# Patient Record
Sex: MALE | Race: WHITE | NOT HISPANIC OR LATINO | Employment: FULL TIME | URBAN - METROPOLITAN AREA
[De-identification: names, ages, dates, MRNs, and addresses within clinical notes are randomized per-mention and may not be internally consistent; named-entity substitution may affect disease eponyms.]

---

## 2018-08-04 ENCOUNTER — APPOINTMENT (OUTPATIENT)
Dept: CT IMAGING | Facility: HOSPITAL | Age: 48
End: 2018-08-04

## 2018-08-04 ENCOUNTER — HOSPITAL ENCOUNTER (INPATIENT)
Facility: HOSPITAL | Age: 48
LOS: 5 days | Discharge: NURSING, REHAB OR LONG-TERM CARE | End: 2018-08-09
Attending: EMERGENCY MEDICINE | Admitting: INTERNAL MEDICINE

## 2018-08-04 ENCOUNTER — APPOINTMENT (OUTPATIENT)
Dept: GENERAL RADIOLOGY | Facility: HOSPITAL | Age: 48
End: 2018-08-04

## 2018-08-04 DIAGNOSIS — Z74.09 IMPAIRED FUNCTIONAL MOBILITY, BALANCE, GAIT, AND ENDURANCE: ICD-10-CM

## 2018-08-04 DIAGNOSIS — I61.2 NONTRAUMATIC HEMORRHAGE OF LEFT CEREBRAL HEMISPHERE (HCC): Primary | ICD-10-CM

## 2018-08-04 DIAGNOSIS — R47.1 DYSARTHRIA: ICD-10-CM

## 2018-08-04 DIAGNOSIS — I10 SEVERE UNCONTROLLED HYPERTENSION: ICD-10-CM

## 2018-08-04 DIAGNOSIS — Z74.09 IMPAIRED MOBILITY AND ADLS: ICD-10-CM

## 2018-08-04 DIAGNOSIS — Z78.9 IMPAIRED MOBILITY AND ADLS: ICD-10-CM

## 2018-08-04 DIAGNOSIS — R53.1 WEAKNESS: ICD-10-CM

## 2018-08-04 PROBLEM — I61.9 ICH (INTRACEREBRAL HEMORRHAGE): Status: ACTIVE | Noted: 2018-08-04

## 2018-08-04 LAB
ABO GROUP BLD: NORMAL
ALBUMIN SERPL-MCNC: 4.87 G/DL (ref 3.2–4.8)
ALBUMIN/GLOB SERPL: 1.4 G/DL (ref 1.5–2.5)
ALP SERPL-CCNC: 108 U/L (ref 25–100)
ALT SERPL W P-5'-P-CCNC: 28 U/L (ref 7–40)
ANION GAP SERPL CALCULATED.3IONS-SCNC: 12 MMOL/L (ref 3–11)
APTT PPP: 27.5 SECONDS (ref 24–31)
AST SERPL-CCNC: 24 U/L (ref 0–33)
BASOPHILS # BLD AUTO: 0.05 10*3/MM3 (ref 0–0.2)
BASOPHILS NFR BLD AUTO: 0.4 % (ref 0–1)
BILIRUB SERPL-MCNC: 0.3 MG/DL (ref 0.3–1.2)
BLD GP AB SCN SERPL QL: NEGATIVE
BUN BLD-MCNC: 20 MG/DL (ref 9–23)
BUN/CREAT SERPL: 17.1 (ref 7–25)
CALCIUM SPEC-SCNC: 9.7 MG/DL (ref 8.7–10.4)
CHLORIDE SERPL-SCNC: 104 MMOL/L (ref 99–109)
CO2 SERPL-SCNC: 26 MMOL/L (ref 20–31)
CREAT BLD-MCNC: 1.17 MG/DL (ref 0.6–1.3)
DEPRECATED RDW RBC AUTO: 47.1 FL (ref 37–54)
EOSINOPHIL # BLD AUTO: 0.25 10*3/MM3 (ref 0–0.3)
EOSINOPHIL NFR BLD AUTO: 2.1 % (ref 0–3)
ERYTHROCYTE [DISTWIDTH] IN BLOOD BY AUTOMATED COUNT: 14.7 % (ref 11.3–14.5)
GFR SERPL CREATININE-BSD FRML MDRD: 67 ML/MIN/1.73
GLOBULIN UR ELPH-MCNC: 3.4 GM/DL
GLUCOSE BLD-MCNC: 112 MG/DL (ref 70–100)
HCT VFR BLD AUTO: 44.6 % (ref 38.9–50.9)
HGB BLD-MCNC: 14.7 G/DL (ref 13.1–17.5)
HOLD SPECIMEN: NORMAL
HOLD SPECIMEN: NORMAL
IMM GRANULOCYTES # BLD: 0.02 10*3/MM3 (ref 0–0.03)
IMM GRANULOCYTES NFR BLD: 0.2 % (ref 0–0.6)
INR PPP: 0.96 (ref 0.91–1.09)
LYMPHOCYTES # BLD AUTO: 1.93 10*3/MM3 (ref 0.6–4.8)
LYMPHOCYTES NFR BLD AUTO: 16.1 % (ref 24–44)
MAGNESIUM SERPL-MCNC: 2.2 MG/DL (ref 1.3–2.7)
MCH RBC QN AUTO: 29.1 PG (ref 27–31)
MCHC RBC AUTO-ENTMCNC: 33 G/DL (ref 32–36)
MCV RBC AUTO: 88.3 FL (ref 80–99)
MONOCYTES # BLD AUTO: 0.88 10*3/MM3 (ref 0–1)
MONOCYTES NFR BLD AUTO: 7.3 % (ref 0–12)
NEUTROPHILS # BLD AUTO: 8.91 10*3/MM3 (ref 1.5–8.3)
NEUTROPHILS NFR BLD AUTO: 74.1 % (ref 41–71)
PHOSPHATE SERPL-MCNC: 1.8 MG/DL (ref 2.4–5.1)
PLATELET # BLD AUTO: 291 10*3/MM3 (ref 150–450)
PMV BLD AUTO: 11.9 FL (ref 6–12)
POTASSIUM BLD-SCNC: 3.7 MMOL/L (ref 3.5–5.5)
PROT SERPL-MCNC: 8.3 G/DL (ref 5.7–8.2)
PROTHROMBIN TIME: 10.1 SECONDS (ref 9.6–11.5)
RBC # BLD AUTO: 5.05 10*6/MM3 (ref 4.2–5.76)
RH BLD: POSITIVE
SODIUM BLD-SCNC: 142 MMOL/L (ref 132–146)
T&S EXPIRATION DATE: NORMAL
TROPONIN I SERPL-MCNC: 0 NG/ML (ref 0–0.07)
WBC NRBC COR # BLD: 12.02 10*3/MM3 (ref 3.5–10.8)
WHOLE BLOOD HOLD SPECIMEN: NORMAL
WHOLE BLOOD HOLD SPECIMEN: NORMAL

## 2018-08-04 PROCEDURE — 71045 X-RAY EXAM CHEST 1 VIEW: CPT

## 2018-08-04 PROCEDURE — 85610 PROTHROMBIN TIME: CPT | Performed by: INTERNAL MEDICINE

## 2018-08-04 PROCEDURE — 86901 BLOOD TYPING SEROLOGIC RH(D): CPT

## 2018-08-04 PROCEDURE — 99024 POSTOP FOLLOW-UP VISIT: CPT | Performed by: NEUROLOGICAL SURGERY

## 2018-08-04 PROCEDURE — 99291 CRITICAL CARE FIRST HOUR: CPT | Performed by: INTERNAL MEDICINE

## 2018-08-04 PROCEDURE — 99253 IP/OBS CNSLTJ NEW/EST LOW 45: CPT | Performed by: NEUROLOGICAL SURGERY

## 2018-08-04 PROCEDURE — 70450 CT HEAD/BRAIN W/O DYE: CPT

## 2018-08-04 PROCEDURE — 85025 COMPLETE CBC W/AUTO DIFF WBC: CPT | Performed by: EMERGENCY MEDICINE

## 2018-08-04 PROCEDURE — 84100 ASSAY OF PHOSPHORUS: CPT | Performed by: INTERNAL MEDICINE

## 2018-08-04 PROCEDURE — 83735 ASSAY OF MAGNESIUM: CPT | Performed by: INTERNAL MEDICINE

## 2018-08-04 PROCEDURE — 84484 ASSAY OF TROPONIN QUANT: CPT

## 2018-08-04 PROCEDURE — 86900 BLOOD TYPING SEROLOGIC ABO: CPT

## 2018-08-04 PROCEDURE — 85730 THROMBOPLASTIN TIME PARTIAL: CPT | Performed by: EMERGENCY MEDICINE

## 2018-08-04 PROCEDURE — 86900 BLOOD TYPING SEROLOGIC ABO: CPT | Performed by: EMERGENCY MEDICINE

## 2018-08-04 PROCEDURE — 86901 BLOOD TYPING SEROLOGIC RH(D): CPT | Performed by: EMERGENCY MEDICINE

## 2018-08-04 PROCEDURE — 99285 EMERGENCY DEPT VISIT HI MDM: CPT

## 2018-08-04 PROCEDURE — 93005 ELECTROCARDIOGRAM TRACING: CPT | Performed by: EMERGENCY MEDICINE

## 2018-08-04 PROCEDURE — 86850 RBC ANTIBODY SCREEN: CPT | Performed by: EMERGENCY MEDICINE

## 2018-08-04 PROCEDURE — 36415 COLL VENOUS BLD VENIPUNCTURE: CPT

## 2018-08-04 PROCEDURE — 80053 COMPREHEN METABOLIC PANEL: CPT | Performed by: EMERGENCY MEDICINE

## 2018-08-04 RX ORDER — PANTOPRAZOLE SODIUM 40 MG/10ML
40 INJECTION, POWDER, LYOPHILIZED, FOR SOLUTION INTRAVENOUS
Status: DISCONTINUED | OUTPATIENT
Start: 2018-08-05 | End: 2018-08-06 | Stop reason: ALTCHOICE

## 2018-08-04 RX ORDER — SODIUM CHLORIDE 9 MG/ML
75 INJECTION, SOLUTION INTRAVENOUS CONTINUOUS
Status: DISCONTINUED | OUTPATIENT
Start: 2018-08-04 | End: 2018-08-09 | Stop reason: HOSPADM

## 2018-08-04 RX ORDER — SODIUM CHLORIDE 0.9 % (FLUSH) 0.9 %
10 SYRINGE (ML) INJECTION AS NEEDED
Status: DISCONTINUED | OUTPATIENT
Start: 2018-08-04 | End: 2018-08-09 | Stop reason: HOSPADM

## 2018-08-04 RX ORDER — SODIUM CHLORIDE 0.9 % (FLUSH) 0.9 %
1-10 SYRINGE (ML) INJECTION AS NEEDED
Status: DISCONTINUED | OUTPATIENT
Start: 2018-08-04 | End: 2018-08-09 | Stop reason: HOSPADM

## 2018-08-04 RX ADMIN — NICARDIPINE HYDROCHLORIDE 5 MG/HR: 2.5 INJECTION INTRAVENOUS at 22:23

## 2018-08-04 RX ADMIN — SODIUM CHLORIDE 75 ML/HR: 9 INJECTION, SOLUTION INTRAVENOUS at 22:24

## 2018-08-05 ENCOUNTER — APPOINTMENT (OUTPATIENT)
Dept: CT IMAGING | Facility: HOSPITAL | Age: 48
End: 2018-08-05

## 2018-08-05 PROBLEM — I10 ESSENTIAL HYPERTENSION: Status: ACTIVE | Noted: 2018-08-05

## 2018-08-05 PROBLEM — Z91.199 H/O NONCOMPLIANCE WITH MEDICAL TREATMENT, PRESENTING HAZARDS TO HEALTH: Status: ACTIVE | Noted: 2018-08-05

## 2018-08-05 PROBLEM — IMO0001 CLASS 3 OBESITY DUE TO EXCESS CALORIES WITH SERIOUS COMORBIDITY AND BODY MASS INDEX (BMI) OF 45.0 TO 49.9 IN ADULT: Status: ACTIVE | Noted: 2018-08-05

## 2018-08-05 LAB
ABO GROUP BLD: NORMAL
AMPHET+METHAMPHET UR QL: NEGATIVE
AMPHETAMINES UR QL: NEGATIVE
ARTICHOKE IGE QN: 152 MG/DL (ref 0–130)
BARBITURATES UR QL SCN: NEGATIVE
BENZODIAZ UR QL SCN: NEGATIVE
BUPRENORPHINE SERPL-MCNC: NEGATIVE NG/ML
CANNABINOIDS SERPL QL: NEGATIVE
CHOLEST SERPL-MCNC: 179 MG/DL (ref 0–200)
COCAINE UR QL: NEGATIVE
GLUCOSE BLDC GLUCOMTR-MCNC: 108 MG/DL (ref 70–130)
GLUCOSE BLDC GLUCOMTR-MCNC: 114 MG/DL (ref 70–130)
HBA1C MFR BLD: 6 % (ref 4.8–5.6)
HDLC SERPL-MCNC: 43 MG/DL (ref 40–60)
METHADONE UR QL SCN: NEGATIVE
OPIATES UR QL: NEGATIVE
OXYCODONE UR QL SCN: NEGATIVE
PCP UR QL SCN: NEGATIVE
PROPOXYPH UR QL: NEGATIVE
RH BLD: POSITIVE
TRICYCLICS UR QL SCN: NEGATIVE
TRIGL SERPL-MCNC: 50 MG/DL (ref 0–150)

## 2018-08-05 PROCEDURE — 97165 OT EVAL LOW COMPLEX 30 MIN: CPT

## 2018-08-05 PROCEDURE — 70450 CT HEAD/BRAIN W/O DYE: CPT

## 2018-08-05 PROCEDURE — 82962 GLUCOSE BLOOD TEST: CPT

## 2018-08-05 PROCEDURE — 83036 HEMOGLOBIN GLYCOSYLATED A1C: CPT | Performed by: INTERNAL MEDICINE

## 2018-08-05 PROCEDURE — 92610 EVALUATE SWALLOWING FUNCTION: CPT

## 2018-08-05 PROCEDURE — 80306 DRUG TEST PRSMV INSTRMNT: CPT | Performed by: INTERNAL MEDICINE

## 2018-08-05 PROCEDURE — 97163 PT EVAL HIGH COMPLEX 45 MIN: CPT

## 2018-08-05 PROCEDURE — 99024 POSTOP FOLLOW-UP VISIT: CPT | Performed by: NEUROLOGICAL SURGERY

## 2018-08-05 PROCEDURE — 92523 SPEECH SOUND LANG COMPREHEN: CPT

## 2018-08-05 PROCEDURE — 80061 LIPID PANEL: CPT | Performed by: INTERNAL MEDICINE

## 2018-08-05 PROCEDURE — 25010000002 HYDRALAZINE PER 20 MG: Performed by: NURSE PRACTITIONER

## 2018-08-05 PROCEDURE — 99233 SBSQ HOSP IP/OBS HIGH 50: CPT | Performed by: INTERNAL MEDICINE

## 2018-08-05 RX ORDER — NAPROXEN SODIUM 220 MG
220 TABLET ORAL 2 TIMES DAILY PRN
COMMUNITY
End: 2018-08-09 | Stop reason: HOSPADM

## 2018-08-05 RX ORDER — LISINOPRIL 10 MG/1
10 TABLET ORAL
Status: DISCONTINUED | OUTPATIENT
Start: 2018-08-05 | End: 2018-08-05

## 2018-08-05 RX ORDER — HYDRALAZINE HYDROCHLORIDE 20 MG/ML
10 INJECTION INTRAMUSCULAR; INTRAVENOUS EVERY 6 HOURS PRN
Status: DISCONTINUED | OUTPATIENT
Start: 2018-08-05 | End: 2018-08-06

## 2018-08-05 RX ORDER — ENALAPRILAT 2.5 MG/2ML
1.25 INJECTION INTRAVENOUS EVERY 6 HOURS PRN
Status: DISCONTINUED | OUTPATIENT
Start: 2018-08-05 | End: 2018-08-05

## 2018-08-05 RX ORDER — LISINOPRIL 10 MG/1
10 TABLET ORAL EVERY 12 HOURS SCHEDULED
Status: DISCONTINUED | OUTPATIENT
Start: 2018-08-05 | End: 2018-08-06

## 2018-08-05 RX ADMIN — LISINOPRIL 10 MG: 10 TABLET ORAL at 12:29

## 2018-08-05 RX ADMIN — NICARDIPINE HYDROCHLORIDE 10 MG/HR: 2.5 INJECTION INTRAVENOUS at 09:35

## 2018-08-05 RX ADMIN — LISINOPRIL 10 MG: 10 TABLET ORAL at 20:57

## 2018-08-05 RX ADMIN — PANTOPRAZOLE SODIUM 40 MG: 40 INJECTION, POWDER, FOR SOLUTION INTRAVENOUS at 06:31

## 2018-08-05 RX ADMIN — HYDRALAZINE HYDROCHLORIDE 10 MG: 20 INJECTION INTRAMUSCULAR; INTRAVENOUS at 18:58

## 2018-08-05 RX ADMIN — NICARDIPINE HYDROCHLORIDE 15 MG/HR: 2.5 INJECTION INTRAVENOUS at 00:43

## 2018-08-05 RX ADMIN — NICARDIPINE HYDROCHLORIDE 7.5 MG/HR: 2.5 INJECTION INTRAVENOUS at 06:31

## 2018-08-05 RX ADMIN — NICARDIPINE HYDROCHLORIDE 15 MG/HR: 2.5 INJECTION INTRAVENOUS at 18:58

## 2018-08-05 RX ADMIN — NICARDIPINE HYDROCHLORIDE 10 MG/HR: 2.5 INJECTION INTRAVENOUS at 13:04

## 2018-08-05 RX ADMIN — NICARDIPINE HYDROCHLORIDE 10 MG/HR: 2.5 INJECTION INTRAVENOUS at 15:41

## 2018-08-05 RX ADMIN — SODIUM CHLORIDE 75 ML/HR: 9 INJECTION, SOLUTION INTRAVENOUS at 12:29

## 2018-08-05 RX ADMIN — SODIUM CHLORIDE 10 MG/HR: 9 INJECTION, SOLUTION INTRAVENOUS at 20:56

## 2018-08-05 RX ADMIN — NICARDIPINE HYDROCHLORIDE 15 MG/HR: 2.5 INJECTION INTRAVENOUS at 17:28

## 2018-08-05 NOTE — CONSULTS
NEUROSURGERY CONSULT    Referring Provider: No ref. provider found  Reason for Consultation: Right hemiparesis    No care team member to display    Chief complaint: Right-sided weakness    Subjective .     History of present illness:  The patient is a 48-year-old man who is a [employer] employee from [place] who developed abrupt onset of right-sided weakness about 1-2 hours ago and was brought to the emergency room at [facility].  He has a past history of hypertension untreated.  CT scan of the head while in the emergency room showed a small to moderate sized left basal ganglia hemorrhage with minimal mass effect.    Results Review:   CT scan: Left basal ganglia hemorrhage measuring 3 cm in maximum extent, 12 mm maximum width.    Review of Systems  Pertinent items are noted in HPI, all other systems reviewed.    History  No past medical history on file., No past surgical history on file., No family history on file., Social History   Substance Use Topics   • Smoking status: Not on file   • Smokeless tobacco: Not on file   • Alcohol use Not on file   ,   (Not in a hospital admission) and Allergies:  Patient has no known allergies.    Objective     Vital Signs   There were no vitals taken for this visit.    Physical Exam:  NEUROLOGICAL EXAMINATION:      MENTAL STATUS:  Alert and oriented.  Speech intact.  Recent and remote memory intact.      CRANIAL NERVES:  Cranial nerve II:  Visual fields are full to confrontation.  Cranial nerves III, IV and VI:  PERRLADC.  Extraocular movements are intact.  Nystagmus is not present.  Cranial nerve V:  Facial sensation is intact to light touch.  Cranial nerve VII:  Mild right central facial weakness  Cranial nerve VIII:  Hearing is intact to finger rub bilaterally.  Cranial nerves IX and X:  Palate elevates symmetrically.  Cranial nerve XI:  Shoulder shrug is intact.  Cranial nerve XII:  Tongue is midline without evidence of atrophy or fasciculation.    MOTOR:  Moderate right  hemiparesis.  Unable to fully raise his arm above his head, but able to move his arm and hand against gravity.  Similarly able to partially raise his leg but not fully against gravity on the right.  Left strength is normal.    SENSATION:  Intact to touch over the left side, somewhat reduced on the right.    REFLEXES:  DTR symmetrical trace upper and lower extremities, positive Babinski on the right.      Assessment/Plan     DIAGNOSIS: Spontaneous left basal ganglia hemorrhage, small to moderate in size, with moderate right hemiparesis and preserved speech.    RECOMMENDATION: Observation in intensive care with blood pressure control.  There will be no need for neurosurgical treatment.  Repeat CT scan in a.m. to confirm stability of the hemorrhage.  Proceed with rehabilitation planning area and    Vipin Fong MD  08/04/18  10:28 PM

## 2018-08-05 NOTE — PROGRESS NOTES
"NEUROSURGERY PROGRESS NOTE    Vital Signs  Blood pressure 144/77, pulse 63, temperature 97.5 °F (36.4 °C), temperature source Axillary, resp. rate 16, height 167.6 cm (66\"), weight 136 kg (300 lb), SpO2 99 %.    Interval History:   Day #1 post ICH.    Neurologically unchanged since last night: Moderate right hemiparesis, speech intact.    A.m. CT scan of the head shows no change in the appearance of the ICH.  It is obviously stable.    Has had significant blood pressure elevation, current reading 144/77.  His ICH is a typical hypertensive hemorrhage location, so long-term blood pressure control is a primary goal.      ASSESSMENT: Stable small-medium left basal ganglia ICH with moderate right hemiparesis.    PLAN: BP control.  Initiate rehabilitation planning.  No need for further neurosurgical follow-up.    Vipin Fong MD  08/05/18  7:41 AM    "

## 2018-08-05 NOTE — THERAPY EVALUATION
Acute Care - Physical Therapy Initial Evaluation  [facility]     Patient Name: Valente Arndt Jr.  : 1970  MRN: [id number]  Today's Date: 2018   Onset of Illness/Injury or Date of Surgery: 18  Date of Referral to PT: 18  Referring Physician: MD Hughes      Admit Date: 2018    Visit Dx:     ICD-10-CM ICD-9-CM   1. Nontraumatic hemorrhage of left cerebral hemisphere (CMS/[facility]) I61.2 431   2. Severe uncontrolled hypertension I10 401.9   3. Weakness R53.1 780.79   4. Dysarthria R47.1 784.51   5. Impaired mobility and ADLs Z74.09 799.89   6. Impaired functional mobility, balance, gait, and endurance Z74.09 V49.89     Patient Active Problem List   Diagnosis   • ICH (intracerebral hemorrhage) (CMS/[facility])   • Essential hypertension   • H/O noncompliance with medical treatment, presenting hazards to health   • Class 3 obesity due to excess calories with serious comorbidity and body mass index (BMI) of 45.0 to 49.9 in adult (CMS/[facility])     History reviewed. No pertinent past medical history.  Past Surgical History:   Procedure Laterality Date   • APPENDECTOMY     • FOOT SURGERY          PT ASSESSMENT (last 12 hours)      Physical Therapy Evaluation     Row Name 18 1335          General Information    Patient Profile Reviewed? yes  -VENITA     Onset of Illness/Injury or Date of Surgery 18  -VENITA     Referring Physician MD Hughes  -VENITA     Patient Observations alert;cooperative;agree to therapy  -VENITA     Prior Level of Function independent:;gait;transfer;bed mobility;ADL's  -VENITA     Equipment Currently Used at Home none  -VENITA     Pertinent History of Current Functional Problem patient admitted with sudden onset of right side weakness found to have left basal ganglia hemorrhage  -VENITA     Risks Reviewed patient and family:;LOB;increased discomfort  -VENITA     Benefits Reviewed patient:;improve function;increase independence;increase strength;decrease risk of DVT  -VENITA     Barriers to Rehab none  identified  -VENITA     Row Name 08/05/18 133          Relationship/Environment    Lives With child(smith), dependent;parent(s)  -VENITA     Row Name 08/05/18 1337          Resource/Environmental Concerns    Current Living Arrangements home/apartment/condo  -VENITA     Resource/Environmental Concerns none  -VENITA     Row Name 08/05/18 1335          Safety Issues, Functional Mobility    Impairments Affecting Function (Mobility) balance;coordination;grasp;motor control;motor planning;strength  -     Row Name 08/05/18 5692          Physical Therapy Clinical Impression    Date of Referral to PT 08/05/18  -VENITA     PT Diagnosis (PT Clinical Impression) impaired bed mobility transfer and gait, decreased strength in right UE and LE decreased balance  -VENITA     Patient/Family Goals Statement (PT Clinical Impression) patient to go home with family  -VENITA     Criteria for Skilled Interventions Met (PT Clinical Impression) yes;treatment indicated  -VENITA     Rehab Potential (PT Clinical Summary) good, to achieve stated therapy goals  -VENITA     Care Plan Review (PT) evaluation/treatment results reviewed;care plan/treatment goals reviewed;risks/benefits reviewed;patient/other agree to care plan  -VENITA     Care Plan Review, Other Participant (PT Clinical Impression) mother  -VENITA     Row Name 08/05/18 7895          Physical Therapy Goals    Bed Mobility Goal Selection (PT) bed mobility, PT goal 1  -VENITA     Transfer Goal Selection (PT) transfer, PT goal 1  -VENITA     Gait Training Goal Selection (PT) gait training, PT goal 1  -VENITA     Balance Goal Selection (PT) balance, PT goal 1  -VENITA     Additional Documentation Balance Goal Selection (PT) (Row)  -     Row Name 08/05/18 8198          Bed Mobility Goal 1 (PT)    Activity/Assistive Device (Bed Mobility Goal 1, PT) sit to supine/supine to sit  -VENITA     [place] Level/Cues Needed (Bed Mobility Goal 1, PT) independent  -VENITA     Time Frame (Bed Mobility Goal 1, PT) long term goal (LTG);10 days  -VENITA      Progress/Outcomes (Bed Mobility Goal 1, PT) goal ongoing  -VENITA     Row Name 08/05/18 1335          Transfer Goal 1 (PT)    Activity/Assistive Device (Transfer Goal 1, PT) sit-to-stand/stand-to-sit  -VENITA     [place] Level/Cues Needed (Transfer Goal 1, PT) independent  -VENITA     Time Frame (Transfer Goal 1, PT) long term goal (LTG);10 days  -VENITA     Progress/Outcome (Transfer Goal 1, PT) goal ongoing  -VENITA     Row Name 08/05/18 1335          Gait Training Goal 1 (PT)    Activity/Assistive Device (Gait Training Goal 1, PT) gait (walking locomotion)  -VNEITA     [place] Level (Gait Training Goal 1, PT) contact guard assist  -VENITA     Distance (Gait Goal 1, PT) 350  -VENITA     Time Frame (Gait Training Goal 1, PT) long term goal (LTG);10 days  -VENITA     Progress/Outcome (Gait Training Goal 1, PT) goal ongoing  -VENITA     Row Name 08/05/18 1335          Balance Goal 1 (PT)    Activity/Assistive Device (Balance Goal 1, PT) standing, dynamic  -VENITA     [place] Level/Cues Needed (Balance Goal 1, PT) contact guard assist  -VENITA     Time Frame (Balance Goal 1, PT) long term goal (LTG);10 days  -VENITA     Progress/Outcomes (Balance Goal 1, PT) goal ongoing  -VENITA     Row Name 08/05/18 2795          Patient Education Goal (PT)    Activity (Patient Education Goal, PT) HEP  -VENITA     [place]/Cues/Accuracy (Memory Goal 2, PT) verbalizes understanding  -VENITA     Time Frame (Patient Education Goal, PT) long term goal (LTG);10 days  -VENITA     Progress/Outcome (Patient Education Goal, PT) goal ongoing  -VENITA       User Key  (r) = Recorded By, (t) = Taken By, (c) = Cosigned By    Initials Name Provider Type    Sharona Mendez, PT Physical Therapist          Physical Therapy Education     Title: PT OT SLP Therapies (Active)     Topic: Physical Therapy (Active)     Point: Mobility training (Active)    Learning Progress Summary     Learner Status Readiness Method Response Comment Documented by    Patient Active Acceptance E NR  VENITA 08/05/18 4330           Point: Home exercise program (Active)    Learning Progress Summary     Learner Status Readiness Method Response Comment Documented by    Patient Active Acceptance E NR  VENITA 08/05/18 1335          Point: Body mechanics (Active)    Learning Progress Summary     Learner Status Readiness Method Response Comment Documented by    Patient Active Acceptance E NR  VENITA 08/05/18 1335          Point: Precautions (Active)    Learning Progress Summary     Learner Status Readiness Method Response Comment Documented by    Patient Active Acceptance E NR  VENITA 08/05/18 1335                      User Key     Initials Effective Dates Name Provider Type Discipline     06/19/15 -  Sharona Rondon, PT Physical Therapist PT                PT Recommendation and Plan  Anticipated Discharge Disposition (PT): inpatient rehabilitation facility  Planned Therapy Interventions (PT Eval): balance training, bed mobility training, gait training, home exercise program, strengthening, transfer training  Therapy Frequency (PT Clinical Impression): daily  Outcome Summary/Treatment Plan (PT)  Anticipated Discharge Disposition (PT): inpatient rehabilitation facility  Plan of Care Reviewed With: patient, mother  Outcome Summary: PT eval completed patient is able to ambulate 250 ft with assist of 2. patient tends to lean to the right during ambulation with dec step length on the right and toe dragging at times. patient may need  IP  rehab stay on D/C for improving mobility          Outcome Measures     Row Name 08/05/18 1335 08/05/18 0818          How much help from another person do you currently need...    Turning from your back to your side while in flat bed without using bedrails? 4  -VENITA  --     Moving from lying on back to sitting on the side of a flat bed without bedrails? 3  -VENITA  --     Moving to and from a bed to a chair (including a wheelchair)? 3  -VENITA  --     Standing up from a chair using your arms (e.g., wheelchair, bedside chair)? 3  -VENITA  --      Climbing 3-5 steps with a railing? 2  -VENITA  --     To walk in hospital room? 2  -VENITA  --     AM-PAC 6 Clicks Score 17  -VENITA  --        How much help from another is currently needed...    Putting on and taking off regular lower body clothing?  -- 2  -JR     Bathing (including washing, rinsing, and drying)  -- 2  -JR     Toileting (which includes using toilet bed pan or urinal)  -- 2  -JR     Putting on and taking off regular upper body clothing  -- 2  -JR     Taking care of personal grooming (such as brushing teeth)  -- 2  -JR     Eating meals  -- 1  -JR     Score  -- 11  -JR        Modified [place] Scale    Modified [place] Scale 4 - Moderately severe disability.  Unable to walk without assistance, and unable to attend to own bodily needs without assistance.  -VENITA 4 - Moderately severe disability.  Unable to walk without assistance, and unable to attend to own bodily needs without assistance.  -JR        Functional Assessment    Outcome Measure Options AM-PAC 6 Clicks Basic Mobility (PT)  -VENITA AM-PAC 6 Clicks Daily Activity (OT);Modified [place]  -JR       User Key  (r) = Recorded By, (t) = Taken By, (c) = Cosigned By    Initials Name Provider Type    Sharona Mendez, PT Physical Therapist    JR Lita Kaminski, OT Occupational Therapist           Time Calculation:         PT Charges     Row Name 08/05/18 2679             Time Calculation    Start Time 1335  -VENITA      PT Received On 08/05/18  -VENITA      PT Goal Re-Cert Due Date 08/15/18  -VENITA        User Key  (r) = Recorded By, (t) = Taken By, (c) = Cosigned By    Initials Name Provider Type    Sharona Mendez PT Physical Therapist        Therapy Suggested Charges     Code   Minutes Charges    None           Therapy Charges for Today     Code Description Service Date Service Provider Modifiers Qty    76364876936 HC PT EVAL HIGH COMPLEXITY 4 8/5/2018 Sharona Rondon, PT GP 1    67962165343 HC PT THER SUPP EA 15 MIN 8/5/2018 Sharona Rondon, PT GP 1           PT G-Codes  Outcome Measure Options: AM-PAC 6 Clicks Basic Mobility (PT)      Sharona Rondon, PT  8/5/2018

## 2018-08-05 NOTE — THERAPY EVALUATION
"Acute Care - Occupational Therapy Initial Evaluation  [facility]     Patient Name: Valente Arndt Jr.  : 1970  MRN: [id number]  Today's Date: 2018  Onset of Illness/Injury or Date of Surgery: 18  Date of Referral to OT: 18  Referring Physician: Dr. Hughes    Admit Date: 2018       ICD-10-CM ICD-9-CM   1. Nontraumatic hemorrhage of left cerebral hemisphere (CMS/HCC) I61.2 431   2. Severe uncontrolled hypertension I10 401.9   3. Weakness R53.1 780.79   4. Dysarthria R47.1 784.51   5. Impaired mobility and ADLs Z74.09 799.89     Patient Active Problem List   Diagnosis   • ICH (intracerebral hemorrhage) (CMS/HCC)     History reviewed. No pertinent past medical history.  Past Surgical History:   Procedure Laterality Date   • APPENDECTOMY     • FOOT SURGERY            OT ASSESSMENT FLOWSHEET (last 72 hours)      Occupational Therapy Evaluation     Row Name 18 0818                   OT Evaluation Time/Intention    Subjective Information no complaints  -JR        Document Type evaluation  -JR        Mode of Treatment occupational therapy  -JR        Patient Effort good  -JR        Symptoms Noted During/After Treatment none  -JR           General Information    Patient Profile Reviewed? yes  -JR        Onset of Illness/Injury or Date of Surgery 18  -JR        Referring Physician Dr. Hughes  -JR        Prior Level of Function independent:;gait;transfer;bed mobility;ADL's;home management;driving  -JR        Equipment Currently Used at Home none  -JR        Pertinent History of Current Functional Problem Pt admitted with sudden onset R sided weakness and \"drunk feeling.\" CT head showed hyperdense acute hemorrhage in L basal ganglia.  -JR        Existing Precautions/Restrictions fall   R sided weakness  -JR        Risks Reviewed patient:;increased discomfort  -JR        Benefits Reviewed patient:;improve function;increase independence  -JR        Barriers to Rehab none identified  " -JR           Relationship/Environment    Primary Source of Support/Comfort child(smith);parent  -JR        Lives With child(smith), dependent;parent(s)   Lives with mother and daughter  -JR           Resource/Environmental Concerns    Current Living Arrangements home/apartment/condo  -JR           Home Main Entrance    Number of Stairs, Main Entrance one  -JR           Stairs Within Home, Secondary    Stairs, Within Home, Secondary --   Pt reports he does not go upstairs  -JR           Cognitive Assessment/Interventions    Additional Documentation Cognitive Assessment/Intervention (Group)  -JR           Cognitive Assessment/Intervention- PT/OT    Affect/Mental Status (Cognitive) flat/blunted affect;sad/depressed affect  -JR        Orientation Status (Cognition) oriented x 4  -JR        Follows Commands (Cognition) WNL  -JR           Safety Issues, Functional Mobility    Impairments Affecting Function (Mobility) balance;coordination;grasp;motor control;motor planning;strength  -JR           Bed Mobility Assessment/Treatment    Bed Mobility Assessment/Treatment supine-sit  -JR        Supine-Sit Wy[place] (Bed Mobility) contact guard  -        Assistive Device (Bed Mobility) bed rails;head of bed elevated  -JR           Transfer Assessment/Treatment    Transfer Assessment/Treatment bed-chair transfer  -JR           Bed-Chair Transfer    Bed-Chair Wyatt (Transfers) contact guard;verbal cues  -           ADL Assessment/Intervention    BADL Assessment/Intervention lower body dressing  -JR           Lower Body Dressing Assessment/Training    Lower Body Dressing Wyatt Level don;socks;dependent (less than 25% patient effort)  -JR        Lower Body Dressing Position supine  -JR           BADL Safety/Performance    Impairments, BADL Safety/Performance balance;grasp/prehension;coordination;motor control;motor planning;strength  -JR           General ROM    GENERAL ROM COMMENTS B UE ROM WFL  -            General Assessment (Manual Muscle Testing)    Comment, General Manual Muscle Testing (MMT) Assessment L UE 5/5 R shoulder flex 2-/5, elbow flex 3/5, R grasp 3/5  -           Motor Assessment/Interventions    Additional Documentation Balance (Group);Gross Motor Coordination (Group);Fine Motor Testing & Training (Group)  -           Gross Motor Coordination    Gross Motor Impairments --   Impaired finger to nose and rapid alternating R  -JR           Balance    Balance static sitting balance;dynamic standing balance  -           Static Sitting Balance    Level of [place] (Unsupported Sitting, Static Balance) contact guard assist  -JR        Sitting Position (Unsupported Sitting, Static Balance) sitting on edge of bed  -JR        Time Able to Maintain Position (Unsupported Sitting, Static Balance) 30 to 45 seconds  -JR           Dynamic Standing Balance    Level of [place], Reaches Outside Midline (Standing, Dynamic Balance) contact guard assist  -JR        Time Able to Maintain Position, Reaches Outside Midline (Standing, Dynamic Balance) less than 15 seconds  -           Fine Motor Testing & Training    Comment, Fine Motor Coordination Impaired opposition R   Pt reports he is R handed  -           Sensory Assessment/Intervention    Sensory General Assessment light touch sensation deficits identified   Mild decreased localization of touch R hand  -JR        Additional Documentation Vision Assessment/Intervention (Group)  -           Vision Assessment/Intervention    Visual Impairment/Limitations WNL  -JR           Positioning and Restraints    Pre-Treatment Position in bed  -JR        Post Treatment Position chair  -JR        In Chair notified nsg;sitting;call light within reach;encouraged to call for assist;RUE elevated  -           Pain Assessment    Additional Documentation Pain Scale: Numbers Pre/Post-Treatment (Group)  -JR           Pain Scale: Numbers Pre/Post-Treatment    Pain Scale:  Numbers, Pretreatment 0/10 - no pain  -JR        Pain Scale: Numbers, Post-Treatment 0/10 - no pain  -JR           Plan of Care Review    Plan of Care Reviewed With patient  -JR           Clinical Impression (OT)    Date of Referral to OT 08/04/18  -JR        OT Diagnosis Decreased independence with ADL's and mobility.  -JR        Patient/Family Goals Statement (OT Eval) Return home  -JR        Criteria for Skilled Therapeutic Interventions Met (OT Eval) yes;treatment indicated  -JR        Rehab Potential (OT Eval) good, to achieve stated therapy goals  -JR        Therapy Frequency (OT Eval) daily  -JR        Care Plan Review (OT) risks/benefits reviewed;patient/other agree to care plan  -JR        Anticipated Equipment Needs at Discharge (OT) feeding equipment  -JR        Anticipated Discharge Disposition (OT) home with OP services  -JR           Vital Signs    Pre Systolic BP Rehab 153  -JR        Pre Treatment Diastolic BP 88  -JR        Post Systolic BP Rehab 133  -JR        Post Treatment Diastolic BP 83  -JR        Pretreatment Heart Rate (beats/min) 82  -JR        Posttreatment Heart Rate (beats/min) 80  -JR        Pre SpO2 (%) 95  -JR        O2 Delivery Pre Treatment room air  -JR        Post SpO2 (%) 93  -JR        O2 Delivery Post Treatment room air  -JR        Pre Patient Position Supine  -JR        Intra Patient Position Standing  -JR        Post Patient Position Sitting  -JR           Planned OT Interventions    Planned Therapy Interventions (OT Eval) adaptive equipment training;BADL retraining;functional balance retraining;neuromuscular control/coordination retraining;occupation/activity based interventions;passive ROM/stretching;patient/caregiver education/training;strengthening exercise;transfer/mobility retraining  -JR           OT Goals    Dressing Goal Selection (OT) dressing, OT goal 1  -JR        Self-Feeding Goal Selection (OT) self feeding, OT goal 1  -JR        Strength Goal Selection (OT)  strength, OT goal 1  -JR        Coordination Goal Selection (OT) coordination, OT goal 1  -JR        Additional Documentation Coordination Goal Selection (OT) (Row);Strength Goal Selection (OT) (Row);Self-Feeding Goal Selection (OT) (Row)  -JR           Dressing Goal 1 (OT)    Activity/Assistive Device (Dressing Goal 1, OT) lower body dressing   denita/doff B socks  -JR        [place]/Cues Needed (Dressing Goal 1, OT) minimum assist (75% or more patient effort);verbal cues required   utilizing one handed technique  -JR        Time Frame (Dressing Goal 1, OT) long term goal (LTG);1 week  -JR        Barriers (Dressing Goal 1, OT) R UE weakness  -JR        Progress/Outcome (Dressing Goal 1, OT) goal ongoing  -JR           Self-Feeding Goal 1 (OT)    Activity/Assistive Device (Self-Feeding Goal 1, OT) self-feeding skills, all   utilizing AE PRN  -JR        [place] Level/Cues Needed (Self-Feeding Goal 1, OT) minimum assist (75% or more patient effort);verbal cues required  -JR        Time Frame (Self-Feeding Goal 1, OT) long term goal (LTG);1 week  -JR        Barriers (Self-Feeding Goal 1, OT) R UE weakness  -JR        Progress/Outcomes (Self-Feeding Goal 1, OT) goal ongoing  -JR           Strength Goal 1 (OT)    Strength Goal 1 (OT) Pt to increase R UE strength by 1/2 muscle grade to increase independence with ADL's.  -JR        Time Frame (Strength Goal 1, OT) long term goal (LTG);1 week  -JR        Progress/Outcome (Strength Goal 1, OT) goal ongoing  -JR           Coordination Goal 1 (OT)    Activity/Assistive Device (Coordination Goal 1, OT) FM written ex program;GM written ex program  -JR        [place] Level/Cues Needed (Coordination Goal 1, OT) independent;verbal cues required  -JR        Time Frame (Coordination Goal 1, OT) long term goal (LTG);1 week  -JR        Progress/Outcomes (Coordination Goal 1, OT) goal ongoing  -JR           Living Environment    Home Accessibility stairs to enter  home;stairs within home;tub/shower is not walk in  -          User Key  (r) = Recorded By, (t) = Taken By, (c) = Cosigned By    Initials Name Effective Dates     Lita Kaminski, OT 06/22/15 -            Occupational Therapy Education     Title: PT OT SLP Therapies (Active)     Topic: Occupational Therapy (Active)     Point: ADL training (Done)     Description: Instruct learner(s) on proper safety adaptation and remediation techniques during self care or transfers.   Instruct in proper use of assistive devices.   Learning Progress Summary     Learner Status Readiness Method Response Comment Documented by    Patient Done Acceptance E VU Educated pt regarding role of therapy, limb protection, safety precautions, transfer techniques and encouraged use of call kaplan.  08/05/18 0904                      User Key     Initials Effective Dates Name Provider Type Discipline     06/22/15 -  Lita Kaminski, OT Occupational Therapist OT                  OT Recommendation and Plan  Outcome Summary/Treatment Plan (OT)  Anticipated Equipment Needs at Discharge (OT): feeding equipment  Anticipated Discharge Disposition (OT): home with OP services  Planned Therapy Interventions (OT Eval): adaptive equipment training, BADL retraining, functional balance retraining, neuromuscular control/coordination retraining, occupation/activity based interventions, passive ROM/stretching, patient/caregiver education/training, strengthening exercise, transfer/mobility retraining  Therapy Frequency (OT Eval): daily  Plan of Care Review  Plan of Care Reviewed With: patient  Plan of Care Reviewed With: patient  Outcome Summary: OT initial eval completed, brief chart review completed. Pt presents with decreased independence with ADL's and mobility. Recommend continued skilled OT services. Pt may benefit from OP OT at d/c.          Outcome Measures     [facility] Name 08/05/18 0818             How much help from another is currently needed...     Putting on and taking off regular lower body clothing? 2  -JR      Bathing (including washing, rinsing, and drying) 2  -JR      Toileting (which includes using toilet bed pan or urinal) 2  -JR      Putting on and taking off regular upper body clothing 2  -JR      Taking care of personal grooming (such as brushing teeth) 2  -JR      Eating meals 1  -JR      Score 11  -JR         Modified Greer Scale    Modified Michael Scale 4 - Moderately severe disability.  Unable to walk without assistance, and unable to attend to own bodily needs without assistance.  -JR         Functional Assessment    Outcome Measure Options AM-PAC 6 Clicks Daily Activity (OT);Modified Greer  -JR        User Key  (r) = Recorded By, (t) = Taken By, (c) = Cosigned By    Initials Name Provider Type    Lita Granados OT Occupational Therapist          Time Calculation:   OT Start Time: 0818  Therapy Suggested Charges     Code   Minutes Charges    None           Therapy Charges for Today     Code Description Service Date Service Provider Modifiers Qty    [id number]  OT EVAL LOW COMPLEXITY 4 8/5/2018 Lita Kaminski OT GO 1               Lita Kaminski OT  8/5/2018

## 2018-08-05 NOTE — H&P
"INTENSIVIST / PULMONARY INITIAL VISIT (CONSULT / H&P) NOTE     Hospital:  LOS: 0 days   Mr. Valente Arndt, 48 y.o. male is followed for:   Chief Complaint   Patient presents with   • Stroke     Active Problems:    ICH (intracerebral hemorrhage) (CMS/HCC)         History of Present Illness   47 y/o WM w/ no PMH, though he apparently was told several years ago to take BP meds, who presents with sudden onset right sided weakness and \"drunk feeling\".  He was found to have a left sided basal ganglia hemorrhage.  No other complaints.  Lifetime non-smoker, on no medications.  Was sitting at home watching television about an hour prior to arrival when this occurred.    PMH:  None, though possibly neglected HTN    PSH:  Appy   Right Ankle    NKDA    No FH of neurologic disorders    SH  No tob/etoh/drugs    No past medical history on file.  No past surgical history on file.  No family history on file.  Social History     Social History   • Marital status: N/A     Spouse name: N/A   • Number of children: N/A   • Years of education: N/A     Occupational History   • Not on file.     Social History Main Topics   • Smoking status: Not on file   • Smokeless tobacco: Not on file   • Alcohol use Not on file   • Drug use: Unknown   • Sexual activity: Not on file     Other Topics Concern   • Not on file     Social History Narrative   • No narrative on file     No Known Allergies  No current facility-administered medications on file prior to encounter.      No current outpatient prescriptions on file prior to encounter.       ROS:  Per HPI, all other systems were reviewed and were negative        OBJECTIVE     Vital Sign Min/Max for last 24 hours  No Data Recorded   No Data Recorded   No Data Recorded   No Data Recorded   No Data Recorded   No Data Recorded     Telemetry:              General Appearance:  Conversant, in no acute distress  Eyes:  No scleral icterus or pallor, pupils normal  Ears, Nose, Mouth, Throat:  Atraumatic, " oropharynx clear  Neck:  Trachea midline, thyroid normal  Respiratory:  Clear to auscultation bilaterally, normal effort, no tenderness to palpation  Cardiovascular:  Regular rate and rhythm, no murmurs, no peripheral edema, no thrill  Gastrointestinal:  Soft, non-tender, non-distended, no hepatosplenomegaly  Skin:  Normal temperature, no rash  Psychiatric:  Alert and oriented x 3, normal judgement and insight  Neuro:  Right facial droop, RUE & RLE weaknes, RUE > RLE but not carlos-paretic, Left side normal, no aphasia/dysarthria    SpO2:   No Data Recorded   Device:      Flow Rate:   No Data Recorded     Mechanical Ventilator Settings:                                         Intake/Ouptut 24 hrs (7:00AM - 6:59 AM)  Intake & Output (last 3 days)     None                Lines, Drains & Airways    Active LDAs     Name:   Placement date:   Placement time:   Site:   Days:    Peripheral IV 08/04/18 2211 Left Antecubital  08/04/18 2211    Antecubital    less than 1    Peripheral IV 08/04/18 2225 Right Antecubital  08/04/18 2225    Antecubital    less than 1                Hematology:        Invalid input(s): NEUTOPHILPCT,  EOSPCT  Electrolytes, Magnesium and Phosphorus:      Renal:      CrCl cannot be calculated (No order found.).  CrCl cannot be calculated (No order found.).  Hepatic:      Arterial Blood Gases:              No results found for: LACTATE    Ct Head Without Contrast    Addendum Date: 8/4/2018 Addendum:   Dr. Ya has seen the report, no questions, 8/4/2018 10:17 PM EDT. THIS DOCUMENT HAS BEEN ELECTRONICALLY SIGNED BY SEPIDEH STOLL MD    Result Date: 8/4/2018  Narrative: EXAM:   CT Head Without Intravenous Contrast EXAM DATE/TIME:   8/4/2018 10:01 PM CLINICAL HISTORY:   The patient age is 48 years old and is male; Signs and symptoms; Weakness, extremity; Right; Additional info: Stroke Right sided weakness TECHNIQUE:   Axial computed tomography images of the head/brain without intravenous contrast.   All CT scans at this facility use at least one of these dose optimization techniques: automated exposure control; mA and/or kV adjustment per patient size (includes targeted exams where dose is matched to clinical indication); or iterative reconstruction. COMPARISON:   No relevant prior studies available. FINDINGS:   Brain:  Hyperdense acute hemorrhage is identified within the left basal ganglia, with a zone of mild hypodense surrounding edema. This area of hemorrhage measures 3.0 x 1.6 x 3.0 cm. This hemorrhage/edema involves the lentiform nuclei and posterior limb of the left internal capsule. Differential considerations include hemorrhagic conversion of infarct and hypertensive bleed, although additional hemorrhagic pathology cannot be excluded.  The white-gray differentiation is otherwise preserved.   Ventricles:  There is mild asymmetry of the lateral ventricles, without midline shift to the right.   Bones/joints:  The calvarium demonstrates no evidence for a depressed fracture.   Soft tissues:  No acute abnormality.   Sinuses:  Unremarkable as visualized.  No acute sinusitis.   Mastoid air cells:  No mastoid effusion.     Impression: 1.  Hyperdense acute hemorrhage is identified within the left basal ganglia, with a zone of mild hypodense surrounding edema. Differential considerations include hemorrhagic conversion of infarct and hypertensive bleed, although additional hemorrhagic pathology cannot be excluded. A follow-up CT in 12-24 hours is recommended. 2.  [place] Stroke Program Early CT Score (ASPECTS) = 8 THIS DOCUMENT HAS BEEN ELECTRONICALLY SIGNED BY SEPIDEH TSOLL MD      Relevant imaging studies and labs from 08/04/18 were reviewed and interpreted by me    Medications (drips):    niCARdipine    sodium chloride Last Rate: 75 mL/hr (08/04/18 4130)            Assessment/Plan   IMPRESSION / PLAN     Inpatient Problem List:  48 y.o.male:  Hospital Problem List     ICH (intracerebral hemorrhage) (CMS/[facility])            Impression:  48 y.o.male with relevant PMH of untreated HTN admitted 8/4/2018 w/ left basal ganglia ICH     Plan:  ICH - BP control w/ cardene, CT in am, NS consulted    HTN - eventually transition to po meds    Hemorrhagic Stroke Order Set    Observe in ICU    Critical Care time spent in direct patient care: 30 minutes (excluding procedure time, if applicable) including high complexity decision making to assess, manipulate, and support vital organ system failure in this individual who has impairment of one or more vital organ systems such that there is a high probability of imminent or life threatening deterioration in the patient’s condition.       Neal Hughes MD  Intensive Care Medicine  08/04/18 10:28 PM

## 2018-08-05 NOTE — THERAPY DISCHARGE NOTE
Acute Care - Speech Language Pathology Initial Eval/Discharge  [facility]   Cognitive-Communication Evaluation  & Clinical Swallow Evaluation     Patient Name: Valente Arndt Jr.  : 1970  MRN: [id number]  Today's Date: 2018  Onset of Illness/Injury or Date of Surgery: 18     Referring Physician: Dr. Hughes      Admit Date: 2018     Visit Dx:    ICD-10-CM ICD-9-CM   1. Nontraumatic hemorrhage of left cerebral hemisphere (CMS/HCC) I61.2 431   2. Severe uncontrolled hypertension I10 401.9   3. Weakness R53.1 780.79   4. Dysarthria R47.1 784.51   5. Impaired mobility and ADLs Z74.09 799.89     Patient Active Problem List   Diagnosis   • ICH (intracerebral hemorrhage) (CMS/[facility])   • Essential hypertension   • H/O noncompliance with medical treatment, presenting hazards to health   • Class 3 obesity due to excess calories with serious comorbidity and body mass index (BMI) of 45.0 to 49.9 in adult (CMS/[facility])     History reviewed. No pertinent past medical history.  Past Surgical History:   Procedure Laterality Date   • APPENDECTOMY     • FOOT SURGERY            SLP EVALUATION (last 72 hours)      SLP SLC Evaluation     Row Name 18 0945                   Communication Assessment/Intervention    Document Type evaluation  -AC        Subjective Information no complaints  -AC        Patient Observations alert;cooperative  -AC        Patient/Family Observations Pt's mother present.  -AC        Patient Effort good  -AC           General Information    Patient Profile Reviewed yes  -AC        Pertinent History Of Current Problem Pt adm w/ ICH, L BG. Pt failed RN CVA dysphagia screen 2' facial asymmetry.   -AC        Precautions/Limitations, Vision WFL;for purposes of eval;corrective lenses needed for reading  -AC        Precautions/Limitations, Hearing WFL;for purposes of eval  -AC        Prior Level of Function-Communication WFL  -AC        Plans/Goals Discussed with patient and family;agreed upon   -AC        Barriers to Rehab none identified  -AC        Patient's Goals for Discharge return to all previous roles/activities;return to home  -AC        Family Goals for Discharge family did not state  -AC           Pain Assessment    Additional Documentation Pain Scale: Numbers Pre/Post-Treatment (Group)  -AC           Pain Scale: Numbers Pre/Post-Treatment    Pain Scale: Numbers, Pretreatment 0/10 - no pain  -AC        Pain Scale: Numbers, Post-Treatment 0/10 - no pain  -AC           Comprehension Assessment/Intervention    Comprehension Assessment/Intervention Auditory Comprehension;Reading Comprehension  -AC           Auditory Comprehension Assessment/Intervention    Auditory Comprehension (Communication) WFL  -AC        Answers Questions (Communication) WFL;complex;yes/no;wh questions;personal;other (see comments)   100% acc  -AC        Able to Follow Commands (Communication) WFL;2-step;other (see comments)   100% acc  -AC        Narrative Discourse WFL;conversational level  -AC           Reading Comprehension Assessment/Intervention    Reading Comprehension (Communication) WFL  -AC        Paragraph Level WFL;other (see comments)   100% acc  -AC           Expression Assessment/Intervention    Expression Assessment/Intervention verbal expression;graphic expression  -AC           Verbal Expression Assessment/Intervention    Verbal Expression WFL  -AC        Automatic Speech (Communication) WFL;response to greeting  -AC        Repetition WFL;phrases;other (see comments)   100% acc  -AC        Responsive Naming WFL;simple;other (see comments)   100% acc  -AC        Confrontational Naming WFL;low frequency;other (see comments)   100% acc  -AC        Conversational Discourse/Fluency WFL  -AC        Verbal Expression, Comment Divergent/convergent naming tasks 100% acc  -AC           Graphic Expression Assessment/Intervention    Graphic Expression non-dominant hand;other (see comments)   Used non-dominant L hand 2' R  hand wknss  -AC        Biographical Information WFL;name;address  -AC        Graphic Expression, Comment Pt able to generate written sentence w/ 100% acc.  -AC           Oral Motor Structure and Function    Oral Motor Structure and Function WFL  -AC        Dentition Assessment natural, present and adequate  -AC        Mucosal Quality dry  -AC           Oral Musculature and Cranial Nerve Assessment    Oral Motor General Assessment oral labial or buccal impairment  -AC        Oral Labial or Buccal Impairment, Detail, Cranial Nerve VII (Facial): CN7: Motor Impairment;right labial droop  -AC           Motor Speech Assessment/Intervention    Motor Speech Function WFL  -AC        Initiation of Phonation (Communication) WFL  -AC        Automatic Speech (Communication) WFL;response to greeting  -AC        Verbal Repetition (Communication) WFL;polysyllabic words;words of increasing length  -AC        Conversational Speech (Communication) WFL  -AC        Motor Speech, Comment Pt 100% intelligible to unfamiliar listener. Slightly reduced intensity; however, pt & his mother reported his speech/voice is at baseline. They denied acute issues.  -AC           Cognitive Assessment Intervention- SLP    Cognitive Function (Cognition) WFL  -AC        Orientation Status (Cognition) WFL;person;place;time;situation  -AC        Memory (Cognitive) WFL;short-term;long-term;other (see comments)   imm recall: 5/5 words, 3-min delay recall: 5/5 words  -AC        Attention (Cognitive) WFL;sustained;divided;other (see comments)   100% acc  -AC        Thought Organization (Cognitive) WFL;concrete divergent;abstract convergent;drawing conclusions;other (see comments)   100% acc  -AC        Reasoning (Cognitive) WFL;complex;deductive;other (see comments)   100% acc  -AC        Problem Solving (Cognitive) WFL;multifactorial;other (see comments)   100% acc  -AC        Executive Function (Cognition) WFL  -AC           SLP Clinical Impressions    SLP  Diagnosis Functional speech, language, and cognitive-linguistic skills. Both pt/his mother denied acute changes in cognitive-communication and agreed no SLP services warranted @ this time.  -AC        Criteria for Skilled Therapy Interventions Met no problems identified which require skilled intervention  -AC           Recommendations    Anticipated Dischage Disposition unknown  -AC          User Key  (r) = Recorded By, (t) = Taken By, (c) = Cosigned By    Initials Name Effective Dates     Conchita López, MS CCC-SLP 07/27/17 -            EDUCATION  The patient has been educated in the following areas:   Cognitive Impairment Communication Impairment.    SLP Recommendation and Plan  SLP Diagnosis: Functional speech, language, and cognitive-linguistic skills. Both pt/his mother denied acute changes in cognitive-communication and agreed no SLP services warranted @ this time.     Criteria for Skilled Therapeutic Interventions Met: no problems identified which require skilled intervention  Criteria for Skilled Therapy Interventions Met: no problems identified which require skilled intervention  Anticipated Dischage Disposition: unknown            SWALLOW EVALUATION (last 72 hours)      SLP Adult Swallow Evaluation     Row Name 08/05/18 1015                   Rehab Evaluation    Document Type evaluation  -AC        Subjective Information no complaints  -AC        Patient Observations alert;cooperative  -AC        Patient/Family Observations Pt's mother present.  -AC        Patient Effort good  -AC           General Information    Patient Profile Reviewed yes  -AC        Current Method of Nutrition NPO  -AC        Prior Level of Function-Swallowing no diet consistency restrictions  -AC        Plans/Goals Discussed with patient and family;agreed upon  -AC        Barriers to Rehab none identified  -AC        Patient's Goals for Discharge return to PO diet  -AC        Family Goals for Discharge family did not state  -AC            Pain Scale: Numbers Pre/Post-Treatment    Pain Scale: Numbers, Pretreatment 0/10 - no pain  -AC        Pain Scale: Numbers, Post-Treatment 0/10 - no pain  -AC           Oral Motor and Function    Dentition Assessment natural, present and adequate  -AC        Secretion Management WNL/WFL  -AC        Mucosal Quality dry  -AC        Volitional Swallow WFL  -AC        Volitional Cough WFL  -AC           Oral Musculature and Cranial Nerve Assessment    Oral Motor General Assessment oral labial or buccal impairment  -AC        Oral Labial or Buccal Impairment, Detail, Cranial Nerve VII (Facial): CN7: Motor Impairment;right labial droop  -AC           General Eating/Swallowing Observations    Eating/Swallowing Skills self-fed;other (see comments)   w/ assist 2' R hand wknss  -AC        Positioning During Eating upright 90 degree;upright in chair  -AC        Utensils Used spoon;cup;straw  -AC        Consistencies Trialed thin liquids;pureed;regular textures   mixed consistencies  -AC           Clinical Swallow Eval    Oral Prep Phase WFL  -AC        Oral Transit WFL  -AC        Oral Residue WFL  -AC        Pharyngeal Phase no overt signs/symptoms of pharyngeal impairment  -AC        Clinical Swallow Evaluation Summary No overt clinical s/sxs aspiration noted w/ any consistency trialed, even when pt pushed w/ several solid trials & 3oz H2O test.   -AC           Clinical Impression    SLP Swallowing Diagnosis functional oral phase;other (see comments)   no overt clinical s/sxs pharyngeal dysphagia  -AC        Criteria for Skilled Therapeutic Interventions Met no problems identified which require skilled intervention  -           Recommendations    SLP Diet Recommendation regular textures;thin liquids  -        Recommended Precautions and Strategies other (see comments)   general aspiration precautions & oral care BID/PRN  -AC        SLP Rec. for Method of Medication Administration meds whole;with thin liquids;as  tolerated  -        Anticipated Dischage Disposition unknown  -          User Key  (r) = Recorded By, (t) = Taken By, (c) = Cosigned By    Initials Name Effective Dates    Conchita Aparicio MS CCC-SLP 07/27/17 -         EDUCATION  The patient has been educated in the following areas:   Oral Care/Hydration.    SLP Recommendation and Plan  SLP Swallowing Diagnosis: functional oral phase, other (see comments) (no overt clinical s/sxs pharyngeal dysphagia)  SLP Diet Recommendation: regular textures, thin liquids     Criteria for Skilled Therapeutic Interventions Met: no problems identified which require skilled intervention  Anticipated Dischage Disposition: unknown    Plan of Care Reviewed With: patient, mother       Time Calculation:         Time Calculation- SLP     Row Name 08/05/18 1103             Time Calculation- SLP    SLP Start Time 0945  -      SLP Received On 08/05/18  -        User Key  (r) = Recorded By, (t) = Taken By, (c) = Cosigned By    Initials Name Provider Type    Conchita Aparicio MS CCC-SLP Speech and Language Pathologist          Therapy Charges for Today     Code Description Service Date Service Provider Modifiers Qty    84265833171 HC ST EVAL ORAL PHARYNG SWALLOW 3 8/5/2018 Conchita López MS CCC-SLP GN 1    27286723852 HC ST EVAL SPEECH AND PROD W LANG  2 8/5/2018 Conchita López MS CCC-SLP GN 1          SLP Discharge Summary  Anticipated Dischage Disposition: unknown  Progress Toward Achieving Short/long Term Goals: discharge on same date as initial evaluation    MS CARO LinaresSLP  8/5/2018

## 2018-08-05 NOTE — PROGRESS NOTES
"INTENSIVIST   PROGRESS NOTE     Hospital:  LOS: 1 day     Mr. Valente Arndt Jr., 48 y.o. male is followed for a Chief Complaint of: Intracerebral Hemorrhage, Hypertension      Subjective   S   Mr. Arndt is a 47yo M with a history of untreated hypertension who presented with sudden onset right sided weakness and \"drunk feeling.\" He had a CT scan of the head performed and was noted to have a left sided basal ganglia hemorrhage. He was admitted to the ICU, started on Cardene, and had serial neuro checks performed.     Interval History:  No events overnight. Awaiting Speech evaluation. On Cardene for BP control.        The patient's relevant past medical, surgical and social history were reviewed and updated in Epic as appropriate.      ROS:   Constitutional: Negative for fever.   Respiratory: Negative for dyspnea.   Cardiovascular: Negative for chest pain.   Gastrointestinal: Negative for  nausea, vomiting and diarrhea.     Objective   O     Vitals:  Temp  Min: 97.5 °F (36.4 °C)  Max: 99 °F (37.2 °C)  BP  Min: 110/51  Max: 224/120  Pulse  Min: 63  Max: 110  Resp  Min: 16  Max: 18  SpO2  Min: 89 %  Max: 99 % No Data Recorded    Intake/Ouptut 24 hrs (7:00AM - 6:59 AM)  Intake & Output (last 3 days)       08/02 0701 - 08/03 0700 08/03 0701 - 08/04 0700 08/04 0701 - 08/05 0700 08/05 0701 - 08/06 0700    I.V. (mL/kg)   1345.8 (9.9)     Total Intake(mL/kg)   1345.8 (9.9)     Urine (mL/kg/hr)   3000     Total Output     3000      Net     -1654.2                    Medications (drips):    niCARdipine Last Rate: 10 mg/hr (08/05/18 0935)   sodium chloride Last Rate: 75 mL/hr (08/04/18 9544)         Physical Examination  Telemetry:  Normal sinus rhythm.    Constitutional:  No acute distress.   Cardiovascular: Normal rate, regular and rhythm. Normal heart sounds.  No murmurs, gallop or rub.   Respiratory: No respiratory distress. Normal respiratory effort.  Normal breath sounds  Clear to ascultation and percussion.  "   Abdominal:  Soft. No masses. Non-tender. No distension. No HSM.   Extremities: No digital cyanosis. No clubbing.  No peripheral edema.   Neurological:   Alert and Oriented to person, place, and time.       Interval: baseline  1a. Level of Consciousness: 0-->Alert, keenly responsive  1b. LOC Questions: 0-->Answers both questions correctly  1c. LOC Commands: 0-->Performs both tasks correctly  2. Best Gaze: 0-->Normal  3. Visual: 0-->No visual loss  4. Facial Palsy: 1-->Minor paralysis (flattened nasolabial fold, asymmetry on smiling)  5a. Motor Arm, Left: 0-->No drift, limb holds 90 (or 45) degrees for full 10 secs  5b. Motor Arm, Right: 1-->Drift, limb holds 90 (or 45) degrees, but drifts down before full 10 secs, does not hit bed or other support  6a. Motor Leg, Left: 0-->No drift, leg holds 30 degree position for full 5 secs  6b. Motor Leg, Right: 0-->No drift, leg holds 30 degree position for full 5 secs  7. Limb Ataxia: 0-->Absent  8. Sensory: 0-->Normal, no sensory loss  9. Best Language: 0-->No aphasia, normal  10. Dysarthria: 0-->Normal  11. Extinction and Inattention (formerly Neglect): 0-->No abnormality    Total (NIH Stroke Scale): 2         Results from last 7 days  Lab Units 08/04/18  2221   WBC 10*3/mm3 12.02*   HEMOGLOBIN g/dL 14.7   MCV fL 88.3   PLATELETS 10*3/mm3 291       Results from last 7 days  Lab Units 08/04/18  2221   SODIUM mmol/L 142   POTASSIUM mmol/L 3.7   CO2 mmol/L 26.0   CREATININE mg/dL 1.17   GLUCOSE mg/dL 112*   MAGNESIUM mg/dL 2.2   PHOSPHORUS mg/dL 1.8*     Estimated Creatinine Clearance: 101.2 mL/min (by C-G formula based on SCr of 1.17 mg/dL).    Results from last 7 days  Lab Units 08/04/18  2221   ALK PHOS U/L 108*   BILIRUBIN mg/dL 0.3   ALT (SGPT) U/L 28   AST (SGOT) U/L 24             Images:  Imaging Results (last 24 hours)     Procedure Component Value Units Date/Time    CT Head Without Contrast [[id number]] Collected:  08/05/18 0826     Updated:  08/05/18 0827     Narrative:       EXAMINATION: CT HEAD WO CONTRAST-      INDICATION: Stroke; I61.2-Nontraumatic intracerebral hemorrhage in  hemisphere, unspecified; I10-Essential (primary) hypertension;  R53.1-Weakness; R47.1-Dysarthria and anarthria      TECHNIQUE: Axial CT of the head without intravenous contrast  administration     The radiation dose reduction device was turned on for each scan per the  ALARA (As Low as Reasonably Achievable) protocol.     COMPARISON: 08/04/2018     FINDINGS: No significant interval change in left basal ganglia  hemorrhage measuring 28 x 18 mm previously 30 x 18 mm with surrounding  edema and mild effacement of the left lateral ventricle however no  midline shift. No hydrocephalus development. No new parenchymal  hemorrhage or intraventricular extension. Globes and orbits  unremarkable. Visualized paranasal sinuses and mastoid air cells are  grossly clear and well pneumatized. Calvarium intact. Hyperostosis  frontalis.             Impression:       No significant interval change in left basal ganglia  hemorrhage with mild mass effect and minimal effacement of the left  lateral ventricle however no midline shift, hydrocephalus development or  intraventricular extension.          XR Chest 1 View [[id number]] Collected:  08/04/18 2207     Updated:  08/04/18 2322    Narrative:       EXAM:    XR Chest, 1 View     EXAM DATE/TIME:    8/4/2018 10:07 PM     CLINICAL HISTORY:    48 years old, male; Signs and symptoms; Other: Stroke protocol; Additional   info: Acute stroke protocol (onset < 12 hrs)     TECHNIQUE:    XR of the chest, 1 view.     COMPARISON:    No relevant prior studies available.     FINDINGS:    Lungs:  There are low lung volumes bilaterally. There is no confluent   infiltrate.    Pleural space: No pleural effusions. No pneumothorax.    Heart/Mediastinum:  The cardiac silhouette appears enlarged.    Bones/joints:  Hypertrophic degenerative changes are noted within the spine.        Impression:       1. There are low lung volumes bilaterally. There is no confluent infiltrate.   2. The cardiac silhouette appears enlarged.     THIS DOCUMENT HAS BEEN ELECTRONICALLY SIGNED BY JAMIE JASMINE MD    CT Head Without Contrast [[id number]] Collected:  08/04/18 2201     Updated:  08/04/18 2217    Addenda:        Dr. Ya has seen the report, no questions, 8/4/2018 10:17 PM EDT.     THIS DOCUMENT HAS BEEN ELECTRONICALLY SIGNED BY JAMIE JASMINE MD  Signed:  08/04/18 2217 by Jamie Jasmine MD    Narrative:       EXAM:    CT Head Without Intravenous Contrast    EXAM DATE/TIME:    8/4/2018 10:01 PM    CLINICAL HISTORY:    The patient age is 48 years old and is male; Signs and symptoms; Weakness,   extremity; Right; Additional info: Stroke Right sided weakness    TECHNIQUE:    Axial computed tomography images of the head/brain without intravenous   contrast.  All CT scans at this facility use at least one of these dose   optimization techniques: automated exposure control; mA and/or kV adjustment   per patient size (includes targeted exams where dose is matched to clinical   indication); or iterative reconstruction.    COMPARISON:    No relevant prior studies available.    FINDINGS:    Brain:  Hyperdense acute hemorrhage is identified within the left basal   ganglia, with a zone of mild hypodense surrounding edema. This area of   hemorrhage measures 3.0 x 1.6 x 3.0 cm. This hemorrhage/edema involves the   lentiform nuclei and posterior limb of the left internal capsule. Differential   considerations include hemorrhagic conversion of infarct and hypertensive   bleed, although additional hemorrhagic pathology cannot be excluded.  The   white-gray differentiation is otherwise preserved.    Ventricles:  There is mild asymmetry of the lateral ventricles, without   midline shift to the right.    Bones/joints:  The calvarium demonstrates no evidence for a depressed   fracture.    Soft tissues:  No acute  abnormality.    Sinuses:  Unremarkable as visualized.  No acute sinusitis.    Mastoid air cells:  No mastoid effusion.      Impression:       1.  Hyperdense acute hemorrhage is identified within the left basal ganglia,   with a zone of mild hypodense surrounding edema. Differential considerations   include hemorrhagic conversion of infarct and hypertensive bleed, although   additional hemorrhagic pathology cannot be excluded. A follow-up CT in 12-24   hours is recommended.  2.  [place] Stroke Program Early CT Score (ASPECTS) = 8    THIS DOCUMENT HAS BEEN ELECTRONICALLY SIGNED BY SEPIDEH STOLL MD            Results: Reviewed.  I reviewed the patient's new laboratory and imaging results.  I independently reviewed the patient's new images.    Medications: Reviewed.    Assessment/Plan   A / P     Mr. Arndt is a 47yo M with untreated hypertension who presented with a left sided basal ganglia hemorrhage. He is currently on Cardene for BP control. CT head this AM shows a stable ICH. Neurosurgery has evaluated and no intervention is needed beyond blood pressure control.     Nutrition:   NPO Diet  Advance Directives:   Code Status and Medical Interventions:   Ordered at: 08/04/18 6431     Code Status:    CPR     Medical Interventions (Level of Support Prior to Arrest):    Full       Hospital Problem List     * (Principal)ICH (intracerebral hemorrhage) (CMS/[facility])    Essential hypertension    H/O noncompliance with medical treatment, presenting hazards to health    Class 3 obesity due to excess calories with serious comorbidity and body mass index (BMI) of 45.0 to 49.9 in adult (CMS/[facility])          Assessment / Plan:    1. Awaiting Speech evaluation prior to PO intake.   2. Start Lisinopril 10mg daily and titrate upward as needed  3. Wean off Cardene after PO medication initiated  4. PT/OT  5. Continue to monitor in the ICU while on Cardene  6. AM labs      I discussed the patient's findings and my recommendations with  patient and nursing staff    Time: was greater than 35 minutes.      Bernadette Collins, DO    Intensive Care Medicine and Pulmonary Medicine

## 2018-08-05 NOTE — PLAN OF CARE
Problem: Patient Care Overview  Goal: Plan of Care Review  Outcome: Ongoing (interventions implemented as appropriate)   08/05/18 1102   Coping/Psychosocial   Plan of Care Reviewed With patient;mother     SLP evaluation completed. Will sign-off as clinical swallow eval revealed functional oral phase and no overt clinical s/sxs pharyngeal dysphagia. Speech, language, and cognitive-linguistic skills WFL. Pt/mother denied acute cog/comm issues & agreed no SLP services needed @ this time. Please see note for further details and recommendations.

## 2018-08-05 NOTE — PLAN OF CARE
Problem: Patient Care Overview  Goal: Plan of Care Review  Outcome: Ongoing (interventions implemented as appropriate)   08/05/18 0640   Coping/Psychosocial   Plan of Care Reviewed With patient;family;father   Plan of Care Review   Progress improving   OTHER   Outcome Summary Pt came to the ED with an ICH that has stabilized. Pt's BP has been in target range with Cardene administration. Will continue to monitor.        Problem: Fall Risk (Adult)  Goal: Identify Related Risk Factors and Signs and Symptoms  Outcome: Outcome(s) achieved Date Met: 08/05/18    Goal: Absence of Fall  Outcome: Outcome(s) achieved Date Met: 08/05/18      Problem: Skin Injury Risk (Adult)  Goal: Identify Related Risk Factors and Signs and Symptoms  Outcome: Outcome(s) achieved Date Met: 08/05/18    Goal: Skin Health and Integrity  Outcome: Ongoing (interventions implemented as appropriate)

## 2018-08-05 NOTE — PLAN OF CARE
Problem: Patient Care Overview  Goal: Plan of Care Review  Outcome: Ongoing (interventions implemented as appropriate)   08/05/18 7029   Coping/Psychosocial   Plan of Care Reviewed With patient;mother   OTHER   Outcome Summary PT eval completed patient is able to ambulate 250 ft with assist of 2. patient tends to lean to the right during ambulation with dec step length on the right and toe dragging at times. patient may need IP rehab stay on D/C for improving mobility

## 2018-08-05 NOTE — PROGRESS NOTES
"NEUROSURGERY PROGRESS NOTE    Vital Signs  Blood pressure (!) 200/159, pulse 79, temperature 99 °F (37.2 °C), temperature source Oral, resp. rate 18, height 167.6 cm (66\"), weight 136 kg (300 lb), SpO2 93 %.    ICH score = 0    Vipin Fong MD  08/04/18  10:34 PM    "

## 2018-08-05 NOTE — PLAN OF CARE
Problem: Patient Care Overview  Goal: Plan of Care Review  Outcome: Ongoing (interventions implemented as appropriate)   08/05/18 0905   Coping/Psychosocial   Plan of Care Reviewed With patient   OTHER   Outcome Summary OT initial eval completed, brief chart review completed. Pt presents with decreased independence with ADL's and mobility. Recommend continued skilled OT services. Pt may benefit from OP OT at d/c.

## 2018-08-05 NOTE — ED PROVIDER NOTES
Subjective   48-year-old male presents emergency Department with right-sided weakness    Patient reports trying to get up approximately 45 minutes prior to arrival and fell directly forward onto the floor with right-sided arm and leg weakness.  He denies any headache.  He's had no change in vision speech or cognition.  Denies any neck pain.  He had no significant trauma with this fall.  He denies any similar symptoms in the past.    He denies any history of chest pain palpitation abdominal pain nausea vomiting diarrhea BRBPR or melena.    Patient does report that he was prescribed blood pressure medicine years ago but he did not take it.  He's had no recent physician follow-up.  He denies any symptoms other than weakness in his right arm and leg    Blood pressure in the 170s per EMS with sinus rhythm on the monitor intact speech positive [place] stroke scale per EMS        History provided by:  Patient  Stroke   Presenting symptoms: change in consciousness, incoordination, loss of balance and weakness    Presenting symptoms: no headaches, no language symptoms, no focal sensory loss and no visual change    Onset quality:  Sudden  Last known well:  45 minutes ago  Timing:  Constant  Progression:  Unchanged  Associated symptoms: fall    Associated symptoms: no chest pain, no difficulty swallowing, no dizziness, no facial pain, no fever, no bladder incontinence, no nausea, no neck pain, no paresthesias, no seizures, no vertigo and no vomiting        Review of Systems   Constitutional: Negative for chills and fever.   HENT: Negative.  Negative for rhinorrhea and trouble swallowing.    Eyes: Negative.    Respiratory: Negative.  Negative for cough and shortness of breath.    Cardiovascular: Negative.  Negative for chest pain.   Gastrointestinal: Negative.  Negative for blood in stool, nausea and vomiting.   Genitourinary: Negative.  Negative for bladder incontinence, dysuria and hematuria.   Musculoskeletal: Negative  for neck pain.   Skin: Negative.    Neurological: Positive for weakness, disturbances in coordination and loss of balance. Negative for dizziness, vertigo, seizures, syncope, speech difficulty, numbness, headaches and paresthesias.   All other systems reviewed and are negative.      History reviewed. No pertinent past medical history.    No Known Allergies    Past Surgical History:   Procedure Laterality Date   • APPENDECTOMY     • FOOT SURGERY         History reviewed. No pertinent family history.    Social History     Social History   • Marital status: Single     Social History Main Topics   • Smoking status: Never Smoker   • Alcohol use Yes   • Drug use: No     Other Topics Concern   • Not on file           Objective   Physical Exam   Constitutional: He is oriented to person, place, and time. He appears well-developed and well-nourished. No distress.   HENT:   Head: Normocephalic and atraumatic.   Nose: Nose normal.   Mouth/Throat: Oropharynx is clear and moist.   Eyes: Conjunctivae and EOM are normal. Right eye exhibits no discharge. Left eye exhibits no discharge.   Mild anisocoria 1 mm right greater than left   Neck: Normal range of motion. Neck supple.   Cardiovascular: Normal rate, regular rhythm, normal heart sounds and intact distal pulses.  Exam reveals no gallop and no friction rub.    No murmur heard.  Pulmonary/Chest: Effort normal and breath sounds normal. No respiratory distress. He has no wheezes. He has no rales. He exhibits no tenderness.   Abdominal: Soft. Bowel sounds are normal. He exhibits no distension. There is no tenderness.   Musculoskeletal: Normal range of motion.   Neurological: He is alert and oriented to person, place, and time. No cranial nerve deficit or sensory deficit. He exhibits abnormal muscle tone. Coordination abnormal.   Right upper extremity appears flaccid but patient can  push and pull but markedly decreased strength in the right upper and lower extremity diffusely  intact sensation.  Cranial nerves II through XII are intact.  DTRs are intact.    Normal speech on initial evaluation and rather to CT.  Very mild dysarthria without a aphasia on reevaluation after CT   Skin: Skin is warm and dry.   Psychiatric: He has a normal mood and affect.   Nursing note and vitals reviewed.      Critical Care  Performed by: HARJIT AVALOS  Authorized by: HARJIT AVALOS     Critical care provider statement:     Critical care time (minutes):  40    Critical care time was exclusive of:  Separately billable procedures and treating other patients    Critical care was necessary to treat or prevent imminent or life-threatening deterioration of the following conditions:  CNS failure or compromise    Critical care was time spent personally by me on the following activities:  Development of treatment plan with patient or surrogate, discussions with consultants, evaluation of patient's response to treatment, examination of patient, obtaining history from patient or surrogate, ordering and performing treatments and interventions, ordering and review of laboratory studies, ordering and review of radiographic studies and re-evaluation of patient's condition    I assumed direction of critical care for this patient from another provider in my specialty: no                 ED Course  ED Course as of Aug 04 2316   Sat Aug 04, 2018   2216 I discussed findings immediately after the CT with the patient.  He had some mild progression of dysarthria but no respiratory difficulties and no aphasia.  Blood pressure is markedly elevated.  Nicardipine as ordered.I discussed the findings at once with Dr. Fong on call for neurosurgery.  He will evaluate the patient is in agreement with this nicardipine.I discussed case with the intensivist will proceed with definitive inpatient management.I updated the patient on the plan of care  [HH]   2220 I discussed and updated the patient and father with plan of care.  [HH]   1494  Harper Hirsch 1  []   2230 ICH score 0  []   2240 Dr. Ya is at the bedside re evaluating the patient. Speech remains clear other than minimal dysarthria.  Continue right-sided weaknessPatient was seen and evaluated in the emergency department by Dr. Fong.  Blood pressure is still being titrated with nicardipine.  [HH]      ED Course User Index  [HH] Major Ya MD                  [facility]      Final diagnoses:   Nontraumatic hemorrhage of left cerebral hemisphere (CMS/HCC)   Severe uncontrolled hypertension   Weakness   Dysarthria            Major Ya MD  08/04/18 8313

## 2018-08-06 LAB
ANION GAP SERPL CALCULATED.3IONS-SCNC: 8 MMOL/L (ref 3–11)
BUN BLD-MCNC: 9 MG/DL (ref 9–23)
BUN/CREAT SERPL: 13.2 (ref 7–25)
CALCIUM SPEC-SCNC: 8.7 MG/DL (ref 8.7–10.4)
CHLORIDE SERPL-SCNC: 109 MMOL/L (ref 99–109)
CO2 SERPL-SCNC: 26 MMOL/L (ref 20–31)
CREAT BLD-MCNC: 0.68 MG/DL (ref 0.6–1.3)
DEPRECATED RDW RBC AUTO: 48.6 FL (ref 37–54)
ERYTHROCYTE [DISTWIDTH] IN BLOOD BY AUTOMATED COUNT: 15.1 % (ref 11.3–14.5)
GFR SERPL CREATININE-BSD FRML MDRD: 124 ML/MIN/1.73
GLUCOSE BLD-MCNC: 108 MG/DL (ref 70–100)
HCT VFR BLD AUTO: 40.5 % (ref 38.9–50.9)
HGB BLD-MCNC: 13.1 G/DL (ref 13.1–17.5)
MAGNESIUM SERPL-MCNC: 2.1 MG/DL (ref 1.3–2.7)
MCH RBC QN AUTO: 28.6 PG (ref 27–31)
MCHC RBC AUTO-ENTMCNC: 32.3 G/DL (ref 32–36)
MCV RBC AUTO: 88.4 FL (ref 80–99)
PHOSPHATE SERPL-MCNC: 2.2 MG/DL (ref 2.4–5.1)
PLATELET # BLD AUTO: 276 10*3/MM3 (ref 150–450)
PMV BLD AUTO: 11.9 FL (ref 6–12)
POTASSIUM BLD-SCNC: 3.3 MMOL/L (ref 3.5–5.5)
POTASSIUM BLD-SCNC: 4.2 MMOL/L (ref 3.5–5.5)
RBC # BLD AUTO: 4.58 10*6/MM3 (ref 4.2–5.76)
SODIUM BLD-SCNC: 143 MMOL/L (ref 132–146)
WBC NRBC COR # BLD: 12.02 10*3/MM3 (ref 3.5–10.8)

## 2018-08-06 PROCEDURE — 97110 THERAPEUTIC EXERCISES: CPT

## 2018-08-06 PROCEDURE — 97530 THERAPEUTIC ACTIVITIES: CPT

## 2018-08-06 PROCEDURE — 99233 SBSQ HOSP IP/OBS HIGH 50: CPT | Performed by: INTERNAL MEDICINE

## 2018-08-06 PROCEDURE — 80048 BASIC METABOLIC PNL TOTAL CA: CPT | Performed by: INTERNAL MEDICINE

## 2018-08-06 PROCEDURE — 85027 COMPLETE CBC AUTOMATED: CPT | Performed by: INTERNAL MEDICINE

## 2018-08-06 PROCEDURE — 84100 ASSAY OF PHOSPHORUS: CPT | Performed by: INTERNAL MEDICINE

## 2018-08-06 PROCEDURE — 83735 ASSAY OF MAGNESIUM: CPT | Performed by: INTERNAL MEDICINE

## 2018-08-06 PROCEDURE — 84132 ASSAY OF SERUM POTASSIUM: CPT | Performed by: INTERNAL MEDICINE

## 2018-08-06 RX ORDER — PANTOPRAZOLE SODIUM 40 MG/1
40 TABLET, DELAYED RELEASE ORAL
Status: DISCONTINUED | OUTPATIENT
Start: 2018-08-07 | End: 2018-08-06

## 2018-08-06 RX ORDER — POTASSIUM CHLORIDE 1.5 G/1.77G
40 POWDER, FOR SOLUTION ORAL AS NEEDED
Status: DISCONTINUED | OUTPATIENT
Start: 2018-08-06 | End: 2018-08-06 | Stop reason: ALTCHOICE

## 2018-08-06 RX ORDER — SIMETHICONE 80 MG
80 TABLET,CHEWABLE ORAL 4 TIMES DAILY PRN
Status: DISCONTINUED | OUTPATIENT
Start: 2018-08-06 | End: 2018-08-09 | Stop reason: HOSPADM

## 2018-08-06 RX ORDER — POTASSIUM CHLORIDE 750 MG/1
40 CAPSULE, EXTENDED RELEASE ORAL AS NEEDED
Status: DISCONTINUED | OUTPATIENT
Start: 2018-08-06 | End: 2018-08-09 | Stop reason: HOSPADM

## 2018-08-06 RX ORDER — LISINOPRIL 10 MG/1
10 TABLET ORAL ONCE
Status: COMPLETED | OUTPATIENT
Start: 2018-08-06 | End: 2018-08-06

## 2018-08-06 RX ORDER — LABETALOL HYDROCHLORIDE 5 MG/ML
10 INJECTION, SOLUTION INTRAVENOUS EVERY 4 HOURS PRN
Status: DISCONTINUED | OUTPATIENT
Start: 2018-08-06 | End: 2018-08-09 | Stop reason: HOSPADM

## 2018-08-06 RX ORDER — LISINOPRIL 10 MG/1
20 TABLET ORAL EVERY 12 HOURS SCHEDULED
Status: DISCONTINUED | OUTPATIENT
Start: 2018-08-06 | End: 2018-08-08

## 2018-08-06 RX ORDER — HYDRALAZINE HYDROCHLORIDE 25 MG/1
25 TABLET, FILM COATED ORAL EVERY 8 HOURS SCHEDULED
Status: DISCONTINUED | OUTPATIENT
Start: 2018-08-06 | End: 2018-08-07

## 2018-08-06 RX ADMIN — LISINOPRIL 10 MG: 10 TABLET ORAL at 08:11

## 2018-08-06 RX ADMIN — POTASSIUM CHLORIDE 40 MEQ: 750 CAPSULE, EXTENDED RELEASE ORAL at 10:06

## 2018-08-06 RX ADMIN — PANTOPRAZOLE SODIUM 40 MG: 40 INJECTION, POWDER, FOR SOLUTION INTRAVENOUS at 06:12

## 2018-08-06 RX ADMIN — HYDRALAZINE HYDROCHLORIDE 25 MG: 25 TABLET ORAL at 14:59

## 2018-08-06 RX ADMIN — SODIUM CHLORIDE 2.5 MG/HR: 9 INJECTION, SOLUTION INTRAVENOUS at 05:08

## 2018-08-06 RX ADMIN — HYDRALAZINE HYDROCHLORIDE 25 MG: 25 TABLET ORAL at 22:12

## 2018-08-06 RX ADMIN — LISINOPRIL 10 MG: 10 TABLET ORAL at 10:06

## 2018-08-06 RX ADMIN — SODIUM CHLORIDE 75 ML/HR: 9 INJECTION, SOLUTION INTRAVENOUS at 02:00

## 2018-08-06 RX ADMIN — SIMETHICONE CHEW TAB 80 MG 80 MG: 80 TABLET ORAL at 18:15

## 2018-08-06 RX ADMIN — POTASSIUM CHLORIDE 40 MEQ: 750 CAPSULE, EXTENDED RELEASE ORAL at 14:59

## 2018-08-06 RX ADMIN — LISINOPRIL 20 MG: 20 TABLET ORAL at 20:47

## 2018-08-06 NOTE — PROGRESS NOTES
Continued Stay Note  [facility]     Patient Name: Valente Arndt Jr.  MRN: [id number]  Today's Date: 8/6/2018    Admit Date: 8/4/2018          Discharge Plan     Row Name 08/06/18 1512       Plan    Plan Comments Obtained an appt. with a new PCP.  Mr. Arndt is to see Dr. Raoul Anthony on Tuesday 9/4/18 @ 1:15.  [address].   # [phone].              Discharge Codes    No documentation.       Expected Discharge Date and Time     Expected Discharge Date Expected Discharge Time    Aug 8, 2018             Mohini Sarah RN

## 2018-08-06 NOTE — PROGRESS NOTES
Discharge Planning Assessment  [facility]     Patient Name: Valente Arndt Jr.  MRN: [id number]  Today's Date: 8/6/2018    Admit Date: 8/4/2018          Discharge Needs Assessment     Row Name 08/06/18 1130       Living Environment    Lives With parent(s);child(smith), dependent    Current Living Arrangements home/apartment/condo   Lives in 2 level home c his children and his mother. His BR is on main floor and he does not have to go upstairs.    Primary Care Provided by self    Provides Primary Care For child(smith)    Family Caregiver if Needed parent(s)    Quality of Family Relationships supportive    Able to Return to Prior Arrangements yes       Resource/Environmental Concerns    Resource/Environmental Concerns none    Transportation Concerns car, none       Transition Planning    Patient/Family Anticipates Transition to inpatient rehabilitation facility    Patient/Family Anticipated Services at Transition none    Transportation Anticipated family or friend will provide       Discharge Needs Assessment    Readmission Within the Last 30 Days no previous admission in last 30 days    Concerns to be Addressed adjustment to diagnosis/illness;basic needs    Equipment Currently Used at Home none            Discharge Plan     Row Name 08/06/18 0008       Plan    Plan [facility]    Patient/Family in Agreement with Plan yes    Plan Comments Talked to Mr. Arndt at .  He lives c his mother and children in 2 level home in [place].  His bedroom is on main level.  He works FT.  He is independent c ADL's.  He does not use DME, HH or Home O2.  He does not have a PCP.  He uses [facility] on [address] His insurance covers most of cost of his medications.  We discussed the probable need for In Pt. rehab as suggested per P.T.  He was in agreement with transferring to [facility] when medically ready.  Referral made to [facility] to evaluate for transfer.     Final Discharge Disposition Code 62 - inpatient rehab facility         Destination     No service coordination in this encounter.      Durable Medical Equipment     No service coordination in this encounter.      Dialysis/Infusion     No service coordination in this encounter.      Home Medical Care     No service coordination in this encounter.      Social Care     No service coordination in this encounter.        Expected Discharge Date and Time     Expected Discharge Date Expected Discharge Time    Aug 8, 2018               Demographic Summary    No documentation.           Functional Status     Row Name 08/06/18 1129       Functional Status    Usual Activity Tolerance excellent    Current Activity Tolerance moderate       Functional Status, IADL    Medications independent    Meal Preparation independent    Housekeeping independent    Laundry independent    Shopping independent       Mental Status Summary    Recent Changes in Mental Status/Cognitive Functioning unable to assess       Employment/    Employment Status employed full time            Psychosocial    No documentation.           Abuse/Neglect    No documentation.           Legal    No documentation.           Substance Abuse    No documentation.           Patient Forms    No documentation.         Mohini Sarah RN

## 2018-08-06 NOTE — PROGRESS NOTES
"Intensive Care Follow-up     Hospital:  LOS: 2 days   Mr. Valente Arndt Jr., 48 y.o. male is followed for:   ICH (intracerebral hemorrhage) (CMS/[facility])        Subjective   Interval History:  The chart is been reviewed. The patient remains on a Cardene drip although this was turned off earlier this morning. He states that he does not have any problems with headaches or breathing troubles. He remains with some right-sided hemiparesis.    The patient's relevant past medical, surgical and social history were reviewed and updated in Epic as appropriate.        Objective     Infusions:    niCARdipine 5-15 mg/hr Last Rate: 2.5 mg/hr (08/06/18 0508)   sodium chloride 75 mL/hr Last Rate: 75 mL/hr (08/06/18 0200)     Medications:    lisinopril 20 mg Oral Q12H       Vital Sign Min/Max for last 24 hours  Temp  Min: 97.7 °F (36.5 °C)  Max: 98.4 °F (36.9 °C)   BP  Min: 105/79  Max: 158/88   Pulse  Min: 54  Max: 112   Resp  Min: 18  Max: 20   SpO2  Min: 88 %  Max: 97 %   No Data Recorded       Input/Output for last 24 hour shift  08/05 0701 - 08/06 0700  In: 4041.7 [I.V.:4041.7]  Out: 1500 [Urine:1500]      Objective:  General Appearance:  Comfortable, well-appearing, in no acute distress and not in pain.    Vital signs: (most recent): Blood pressure 146/91, pulse 64, temperature 97.9 °F (36.6 °C), temperature source Oral, resp. rate 18, height 167.6 cm (66\"), weight 136 kg (300 lb), SpO2 94 %.  No fever.    Output: Producing urine.    HEENT: Normal HEENT exam.    Lungs:  Normal effort and normal respiratory rate.  Breath sounds clear to auscultation.  He is not in respiratory distress.  No stridor.  No rales, decreased breath sounds, wheezes or rhonchi.    Heart: Normal rate.  Regular rhythm.  S1 normal and S2 normal.  No murmur or friction rub.   Chest: Symmetric chest wall expansion.   Abdomen: Abdomen is soft and non-distended.  Bowel sounds are normal.   There is no abdominal tenderness.     Extremities: (Decreased 3-4 out of " 5  strength on the right.)  Pulses: Distal pulses are intact.    Neurological: Patient is alert and oriented to person, place and time.    Pupils:  Pupils are equal, round, and reactive to light.    Skin:  Warm and dry.  No rash or cyanosis.               Results from last 7 days  Lab Units 08/06/18  0453 08/04/18  2221   WBC 10*3/mm3 12.02* 12.02*   HEMOGLOBIN g/dL 13.1 14.7   PLATELETS 10*3/mm3 276 291       Results from last 7 days  Lab Units 08/06/18  0453 08/04/18  2221   SODIUM mmol/L 143 142   POTASSIUM mmol/L 3.3* 3.7   CO2 mmol/L 26.0 26.0   BUN mg/dL 9 20   CREATININE mg/dL 0.68 1.17   MAGNESIUM mg/dL 2.1 2.2   PHOSPHORUS mg/dL 2.2* 1.8*   GLUCOSE mg/dL 108* 112*     Estimated Creatinine Clearance: 174.2 mL/min (by C-G formula based on SCr of 0.68 mg/dL).          I reviewed the patient's results and images.     Assessment/Plan   Impression      Principal Problem:    ICH (intracerebral hemorrhage) (CMS/[facility])  Active Problems:    Essential hypertension    H/O noncompliance with medical treatment, presenting hazards to health    Class 3 obesity       Plan        Increase lisinopril to 20 mg by mouth twice daily.  Wean Cardene.  Continue with physical therapy and occupational therapy.  Follow-up labs and orders have been placed for tomorrow morning.  Replace potassium.  We will plan to transition him from the intensive care unit when we are able to turn off the Cardene.    Plan of care and goals reviewed with mulitdisciplinary team at daily rounds.   I discussed the patient's findings and my recommendations with patient, family and nursing staff         Papo Melgar MD, [facility]  Pulmonary and Critical Care Medicine  08/06/18 12:08 PM

## 2018-08-06 NOTE — CONSULTS
Patient does not meet diabetes education order criteria, therefore patient was not seen for diabetes education at this time A1c 6%.  Please re consult as needed.

## 2018-08-06 NOTE — PAYOR COMM NOTE
"Benny Arndt  (48 y.o. Male)     Date of Birth Social Security Number Address Home Phone MRN    1970  [id number]  [facility] [phone] [id number]    Druze Marital Status          None Single       Admission Date Admission Type Admitting Provider Attending Provider Department, Room/Bed    8/4/18 Emergency Neal Hughes MD Thompson, Patton Weddington, MD [facility] 2B ICU, N232/1    Discharge Date Discharge Disposition Discharge Destination                       Attending Provider:  Neal Hughes MD    Allergies:  No Known Allergies    Isolation:  None   Infection:  None   Code Status:  CPR    Ht:  167.6 cm (66\")   Wt:  136 kg (300 lb)    Admission Cmt:  None   Principal Problem:  ICH (intracerebral hemorrhage) (CMS/[facility]) [I61.9]                 Active Insurance as of 8/4/2018     Primary Coverage     Payor Plan Insurance Group Employer/Plan Group    [employer] [facility] [employer] [facility] PPO [id number]     Payor Plan Address Payor Plan Phone Number Effective From Effective To    [address] [phone] 1/1/2014     [facility] [id number]       Subscriber Name Subscriber Birth Date Member ID       BENNY ARNDT JR. 1970 [id number]                 Emergency Contacts      (Rel.) Home Phone Work Phone Mobile Phone    Benny Arndt Sr (Father) [phone] -- [phone]                  ICU Vital Signs     Row Name 08/06/18 1459 08/06/18 1445 08/06/18 1430 08/06/18 1415 08/06/18 1400       Vitals    Pulse 78 77 72 72 74    Heart Rate Source  --  --  --  -- Monitor    Resp  --  --  --  -- 18    Resp Rate Source  --  --  --  -- Visual    /96 158/96 (!)  158/113 150/90  --    Noninvasive MAP (mmHg)  -- 111 134 101  --       Oxygen Therapy    SpO2  -- 94 % 94 % 94 % 97 %    Pulse Oximetry Type  --  --  --  -- Continuous    Device (Oxygen Therapy)  --  --  --  -- room air    Row Name 08/06/18 1330 08/06/18 " 1315 08/06/18 1300 08/06/18 1245 08/06/18 1230       Vitals    Pulse 66 60 81 79 78    /95 158/91 (!)  165/111 157/86 149/82    Noninvasive MAP (mmHg) 105 125 128 122 116       Oxygen Therapy    SpO2 97 % 97 % 95 % 92 % 92 %    Row Name 08/06/18 1215 08/06/18 1200 08/06/18 1130 08/06/18 1115 08/06/18 1100       Vitals    Temp  -- 98.2 °F (36.8 °C)  --  --  --    Temp src  -- Oral  --  --  --    Pulse 82 85 64 65 62    Heart Rate Source  -- Monitor  --  --  --    Resp  -- 18  --  --  --    Resp Rate Source  -- Visual  --  --  --    BP (!)  159/103 (!)  139/113 146/91 145/90 147/93    Noninvasive MAP (mmHg) 120 119 130 109 107       Oxygen Therapy    SpO2 95 % 97 % 94 % 95 % 95 %    Pulse Oximetry Type  -- Continuous  --  --  --    Device (Oxygen Therapy)  -- room air  --  --  --    Row Name 08/06/18 1045 08/06/18 1030 08/06/18 1015 08/06/18 1006 08/06/18 1000       Vitals    Pulse 65 65 65 72 54    Heart Rate Source  --  --  --  -- Monitor    Resp  --  --  --  -- 20    Resp Rate Source  --  --  --  -- Visual    /93 141/86 146/83 130/85 130/85    Noninvasive MAP (mmHg) 117 102 94  -- 99       Oxygen Therapy    SpO2 94 % 95 % 95 %  -- 94 %    Pulse Oximetry Type  --  --  --  -- Continuous    Device (Oxygen Therapy)  --  --  --  -- room air    Row Name 08/06/18 0945 08/06/18 0930 08/06/18 0915 08/06/18 0900 08/06/18 0845       Vitals    Pulse 55 69 70 57 63    /86 146/89 146/89 150/93 136/84    Noninvasive MAP (mmHg) 115 101 106 103 108       Oxygen Therapy    SpO2 96 % 94 % 95 % 96 % 94 %    Row Name 08/06/18 0830 08/06/18 0815 08/06/18 0811 08/06/18 0800 08/06/18 0745       Vitals    Temp  --  --  -- 97.9 °F (36.6 °C)  --    Temp src  --  --  -- Oral  --    Pulse 67 66 69 60 68    Heart Rate Source  --  --  -- Monitor  --    Resp  --  --  -- 18  --    Resp Rate Source  --  --  -- Visual  --    /92 158/88 145/96 145/96 132/76    Noninvasive MAP (mmHg) 113 103  -- 115 95       Oxygen Therapy     SpO2 95 % 94 %  -- 94 % 93 %    Pulse Oximetry Type  --  --  -- Continuous  --    Device (Oxygen Therapy)  --  --  -- room air  --    Row Name 08/06/18 0730 08/06/18 0715 08/06/18 0700 08/06/18 0645 08/06/18 0630       Vitals    Pulse 108 65 65 63 60    BP (!)  158/101 130/86 134/78 128/69 144/91    Noninvasive MAP (mmHg) 121 95 104 79 100       Oxygen Therapy    SpO2 94 % 95 % 96 % 94 % 90 %    Device (Oxygen Therapy)  --  -- room air  --  --    Row Name 08/06/18 0615 08/06/18 0600 08/06/18 0545 08/06/18 0530 08/06/18 0515       Vitals    Pulse 57 60 64 66 112    Heart Rate Source  -- Monitor  --  --  --    Resp  -- 18  --  --  --    Resp Rate Source  -- Visual  --  --  --    /80 134/80 119/74 120/73 130/81    Noninvasive MAP (mmHg) 101 107 90 89 104    BP Location  -- Right arm  --  --  --    BP Method  -- Automatic  --  --  --    Patient Position  -- Lying  --  --  --       Oxygen Therapy    SpO2 91 % 94 % 94 % 95 % 93 %    Pulse Oximetry Type  -- Continuous  --  --  --    Device (Oxygen Therapy)  -- room air  --  --  --    Row Name 08/06/18 0500 08/06/18 0445 08/06/18 0430 08/06/18 0415 08/06/18 0400       Vitals    Pulse 62 60 56 68 65    Heart Rate Source  --  --  --  -- Monitor    Resp  --  --  --  -- 18    Resp Rate Source  --  --  --  -- Visual    /64 112/66 118/68 124/69 111/59    Noninvasive MAP (mmHg) 85 80 87 93 78    BP Location  --  --  --  -- Right arm    BP Method  --  --  --  -- Automatic    Patient Position  --  --  --  -- Lying       Oxygen Therapy    SpO2 94 % 94 % 96 % 94 % 94 %    Pulse Oximetry Type  --  --  --  -- Continuous    Device (Oxygen Therapy)  --  --  --  -- room air    Row Name 08/06/18 0345 08/06/18 0330 08/06/18 0315 08/06/18 0300 08/06/18 0245       Vitals    Pulse 63 66 60 59 58    /64 140/89 141/83 122/78 134/75    Noninvasive MAP (mmHg) 71 105 106 100 99       Oxygen Therapy    SpO2 94 % 91 % 92 % 95 % 91 %    Row Name 08/06/18 0230 08/06/18 0215 08/06/18  0200 08/06/18 0145 08/06/18 0130       Vitals    Pulse 60 68 64 69 66    Heart Rate Source  --  -- Monitor  --  --    Resp  --  -- 20  --  --    Resp Rate Source  --  -- Visual  --  --    /84 130/71 107/61 115/60 123/65    Noninvasive MAP (mmHg) 95 86 72 75 79    BP Location  --  -- Right arm  --  --    BP Method  --  -- Automatic  --  --    Patient Position  --  -- Lying  --  --       Oxygen Therapy    SpO2 (!)  88 % 93 % 94 % 94 % 96 %    Pulse Oximetry Type  --  -- Continuous  --  --    Device (Oxygen Therapy)  --  -- room air  --  --    Row Name 08/06/18 0115 08/06/18 0100 08/06/18 0045 08/06/18 0030 08/06/18 0015       Vitals    Pulse 75 78 72 87 86    /82 132/80 105/79 141/84 123/72    Noninvasive MAP (mmHg) 104 93 85 101 87       Oxygen Therapy    SpO2 95 % 93 % 95 % 95 % 95 %    Row Name 08/06/18 0000 08/05/18 2345 08/05/18 2330 08/05/18 2315 08/05/18 2300       Vitals    Temp 98.4 °F (36.9 °C)  --  --  --  --    Temp src Oral  --  --  --  --    Pulse 68 69 72 62 77    Heart Rate Source Monitor  --  --  --  --    Resp 18  --  --  --  --    Resp Rate Source Visual  --  --  --  --    /70 130/81 112/67 115/64 112/65    Noninvasive MAP (mmHg) 86 95 91 88 89    BP Location Right arm  --  --  --  --    BP Method Automatic  --  --  --  --    Patient Position Lying  --  --  --  --       Oxygen Therapy    SpO2 95 % 95 % 94 % 94 % 95 %    Pulse Oximetry Type Continuous  --  --  --  --    Device (Oxygen Therapy) room air  --  --  --  --    Row Name 08/05/18 2245 08/05/18 2230 08/05/18 2215 08/05/18 2200 08/05/18 2145       Vitals    Pulse 73 70 71 66 78    Heart Rate Source  --  --  -- Monitor  --    Resp  --  --  -- 20  --    Resp Rate Source  --  --  -- Visual  --    /65 133/76 137/82 131/83 129/83    Noninvasive MAP (mmHg) 87 95 97 100 98    BP Location  --  --  -- Right arm  --    BP Method  --  --  -- Automatic  --    Patient Position  --  --  -- Lying  --       Oxygen Therapy    SpO2 95 %  94 % 94 % 96 % 96 %    Pulse Oximetry Type  --  --  -- Continuous  --    Device (Oxygen Therapy)  --  --  -- room air  --    [facility] 08/05/18 2130 08/05/18 2115 08/05/18 2100 08/05/18 2045 08/05/18 2030       Vitals    Pulse 75 80 71 75 86    /80 139/79 128/84 128/72 111/63    Noninvasive MAP (mmHg) 95 97 105 93 81       Oxygen Therapy    SpO2 96 % 93 % 94 % 94 % 95 %    Row Name 08/05/18 2015 08/05/18 2000 08/05/18 1945 08/05/18 1930 08/05/18 1915       Vitals    Temp  -- 98.4 °F (36.9 °C)  --  --  --    Temp src  -- Oral  --  --  --    Pulse 60 64 80 73 71    Heart Rate Source  -- Monitor  --  --  --    Resp  -- 18  --  --  --    Resp Rate Source  -- Visual  --  --  --    /59 128/70 128/63 138/85 142/78    Noninvasive MAP (mmHg) 83 83 82 103 91    BP Location  -- Right arm  --  --  --    BP Method  -- Automatic  --  --  --    Patient Position  -- Lying  --  --  --       Oxygen Therapy    SpO2 95 % 95 % 95 % 95 % 95 %    Pulse Oximetry Type  -- Continuous  --  --  --    Device (Oxygen Therapy)  -- room air  --  --  --    Row Name 08/05/18 1900 08/05/18 1845 08/05/18 1830 08/05/18 1815 08/05/18 1800       Vitals    Pulse 65 66 75 90 80    Resp  --  --  --  -- 20    Resp Rate Source  --  --  --  -- Visual    /82 145/82 145/80 136/81 145/82    Noninvasive MAP (mmHg) 94 96 98 101 95       Oxygen Therapy    SpO2 92 % 92 % 94 % 92 % 92 %    Device (Oxygen Therapy)  --  --  --  -- room air    Row Name 08/05/18 1745 08/05/18 1730 08/05/18 1715 08/05/18 1700 08/05/18 1645       Vitals    Pulse 85 83 59 63 59    /88 146/89 123/81 126/69 121/76    Noninvasive MAP (mmHg) 102 104 93 98 103       Oxygen Therapy    SpO2 93 % 95 % 93 % 96 % 95 %    Row Name 08/05/18 1630 08/05/18 1615 08/05/18 1600             Vitals    Temp  --  -- 97.7 °F (36.5 °C)      Temp src  --  -- Oral      Pulse 63 62 56      Resp  --  -- 18      Resp Rate Source  --  -- Visual      /79 134/81 141/81      Noninvasive MAP  "(mmHg) 103 103 97         Oxygen Therapy    SpO2 92 % 94 % 95 %      Device (Oxygen Therapy)  --  -- room air          Hospital Medications (active)       Dose Frequency Start End    hydrALAZINE (APRESOLINE) tablet 25 mg 25 mg Every 8 Hours Scheduled 8/6/2018     Sig - Route: Take 1 tablet by mouth Every 8 (Eight) Hours. - Oral    labetalol (NORMODYNE,TRANDATE) injection 10 mg 10 mg Every 4 Hours PRN 8/6/2018     Sig - Route: Infuse 2 mL into a venous catheter Every 4 (Four) Hours As Needed for High Blood Pressure. - Intravenous    lisinopril (PRINIVIL,ZESTRIL) tablet 10 mg 10 mg Once 8/6/2018 8/6/2018    Sig - Route: Take 1 tablet by mouth 1 (One) Time. - Oral    lisinopril (PRINIVIL,ZESTRIL) tablet 20 mg 20 mg Every 12 Hours Scheduled 8/6/2018     Sig - Route: Take 1 tablet by mouth Every 12 (Twelve) Hours. - Oral    niCARdipine (CARDENE) 50 mg in sodium chloride 0.9 % 500 mL (0.1 mg/mL) infusion 5-15 mg/hr Titrated 8/5/2018     Sig - Route: Infuse 5-15 mg/hr into a venous catheter Dose Adjusted By Provider As Needed. - Intravenous    potassium & sodium phosphates (PHOS-NAK) 280-160-250 MG packet - for Phosphorus 1.25 - 2.1 mg/dL 2 packet Once As Needed 8/6/2018     Sig - Route: Take 2 packets by mouth 1 (One) Time As Needed (Phosphorus 1.25 - 2.1 mg/dL). - Oral    Linked Group 1:  \"Or\" Linked Group Details        potassium & sodium phosphates (PHOS-NAK) 280-160-250 MG packet - for Phosphorus less than 1.25 mg/dL 2 packet Every 6 Hours PRN 8/6/2018     Sig - Route: Take 2 packets by mouth Every 6 (Six) Hours As Needed (Phosphorus less than 1.25 mg/dL). - Oral    Linked Group 1:  \"Or\" Linked Group Details        potassium chloride (MICRO-K) CR capsule 40 mEq 40 mEq As Needed 8/6/2018     Sig - Route: Take 4 capsules by mouth As Needed (potassium replacement.  see admin instructions). - Oral    sodium chloride 0.9 % flush 1-10 mL 1-10 mL As Needed 8/4/2018     Sig - Route: Infuse 1-10 mL into a venous catheter As " Needed for Line Care. - Intravenous    sodium chloride 0.9 % flush 10 mL 10 mL As Needed 8/4/2018     Sig - Route: Infuse 10 mL into a venous catheter As Needed for Line Care. - Intravenous    sodium chloride 0.9 % infusion 75 mL/hr Continuous 8/4/2018     Sig - Route: Infuse 75 mL/hr into a venous catheter Continuous. - Intravenous    enalaprilat (VASOTEC) injection 1.25 mg (Discontinued) 1.25 mg Every 6 Hours PRN 8/5/2018 8/5/2018    Sig - Route: Infuse 1 mL into a venous catheter Every 6 (Six) Hours As Needed for High Blood Pressure. - Intravenous    hydrALAZINE (APRESOLINE) injection 10 mg (Discontinued) 10 mg Every 6 Hours PRN 8/5/2018 8/6/2018    Sig - Route: Infuse 0.5 mL into a venous catheter Every 6 (Six) Hours As Needed for High Blood Pressure. - Intravenous    lisinopril (PRINIVIL,ZESTRIL) tablet 10 mg (Discontinued) 10 mg Every 24 Hours Scheduled 8/5/2018 8/5/2018    Sig - Route: Take 1 tablet by mouth Daily. - Oral    lisinopril (PRINIVIL,ZESTRIL) tablet 10 mg (Discontinued) 10 mg Every 12 Hours Scheduled 8/5/2018 8/6/2018    Sig - Route: Take 1 tablet by mouth Every 12 (Twelve) Hours. - Oral    niCARdipine (CARDENE) 25 mg/250 mL (0.1 mg/mL) 0.9% NS VTB infusion (Discontinued) 5-15 mg/hr Titrated 8/4/2018 8/5/2018    Sig - Route: Infuse 5-15 mg/hr into a venous catheter Dose Adjusted By Provider As Needed. - Intravenous    niCARdipine (CARDENE-IV) 20 mg/200 mL (0.1 mg/mL) in 0.9% NaCl infusion (Discontinued) 5-15 mg/hr Titrated 8/5/2018 8/5/2018    Sig - Route: Infuse 5-15 mg/hr into a venous catheter Dose Adjusted By Provider As Needed. - Intravenous    pantoprazole (PROTONIX) EC tablet 40 mg (Discontinued) 40 mg Every Early Morning 8/7/2018 8/6/2018    Sig - Route: Take 1 tablet by mouth Every Morning. - Oral    pantoprazole (PROTONIX) injection 40 mg (Discontinued) 40 mg Every Early Morning 8/5/2018 8/6/2018    Sig - Route: Infuse 10 mL into a venous catheter Every Morning. - Intravenous    Reason  for Discontinue: Alternate therapy    potassium chloride (KLOR-CON) packet 40 mEq (Discontinued) 40 mEq As Needed 8/6/2018 8/6/2018    Sig - Route: Take 40 mEq by mouth As Needed (potassium replacement, see admin instructions). - Oral    Reason for Discontinue: Alternate therapy          Lab Results (last 24 hours)     Procedure Component Value Units Date/Time    Basic Metabolic Panel [[id number]]  (Abnormal) Collected:  08/06/18 0453    Specimen:  Blood Updated:  08/06/18 0613     Glucose 108 (H) mg/dL      BUN 9 mg/dL      Creatinine 0.68 mg/dL      Sodium 143 mmol/L      Potassium 3.3 (L) mmol/L      Chloride 109 mmol/L      CO2 26.0 mmol/L      Calcium 8.7 mg/dL      eGFR Non African Amer 124 mL/min/1.73      BUN/Creatinine Ratio 13.2     Anion Gap 8.0 mmol/L     Narrative:       [employer] Guidelines    Stage     Description        GFR  1         Normal or High     90+  2         Mild decrease      60-89  3         Moderate decrease  30-59  4         Severe decrease    15-29  5         Kidney failure     <15    Phosphorus [[id number]]  (Abnormal) Collected:  08/06/18 0453    Specimen:  Blood Updated:  08/06/18 0604     Phosphorus 2.2 (L) mg/dL     Magnesium [[id number]]  (Normal) Collected:  08/06/18 0453    Specimen:  Blood Updated:  08/06/18 0604     Magnesium 2.1 mg/dL     CBC (No Diff) [[id number]]  (Abnormal) Collected:  08/06/18 0453    Specimen:  Blood Updated:  08/06/18 0551     WBC 12.02 (H) 10*3/mm3      RBC 4.58 10*6/mm3      Hemoglobin 13.1 g/dL      Hematocrit 40.5 %      MCV 88.4 fL      MCH 28.6 pg      MCHC 32.3 g/dL      RDW 15.1 (H) %      RDW-SD 48.6 fl      MPV 11.9 fL      Platelets 276 10*3/mm3         Imaging Results (last 24 hours)     ** No results found for the last 24 hours. **           Physician Progress Notes (last 24 hours) (Notes from 8/5/2018  3:51 PM through 8/6/2018  3:51 PM)      Papo Melgar MD at 8/6/2018 12:08 PM          Intensive Care Follow-up  "    Hospital:  LOS: 2 days   Mr. Valente Arndt Jr., 48 y.o. male is followed for:   ICH (intracerebral hemorrhage) (CMS/HCC)     Subjective   Subjective   Interval History:  The chart is been reviewed. The patient remains on a Cardene drip although this was turned off earlier this morning. He states that he does not have any problems with headaches or breathing troubles. He remains with some right-sided hemiparesis.    The patient's relevant past medical, surgical and social history were reviewed and updated in Epic as appropriate.     Objective   Objective     Infusions:    niCARdipine 5-15 mg/hr Last Rate: 2.5 mg/hr (08/06/18 0508)   sodium chloride 75 mL/hr Last Rate: 75 mL/hr (08/06/18 0200)     Medications:    lisinopril 20 mg Oral Q12H       Vital Sign Min/Max for last 24 hours  Temp  Min: 97.7 °F (36.5 °C)  Max: 98.4 °F (36.9 °C)   BP  Min: 105/79  Max: 158/88   Pulse  Min: 54  Max: 112   Resp  Min: 18  Max: 20   SpO2  Min: 88 %  Max: 97 %   No Data Recorded       Input/Output for last 24 hour shift  08/05 0701 - 08/06 0700  In: 4041.7 [I.V.:4041.7]  Out: 1500 [Urine:1500]      Objective:  General Appearance:  Comfortable, well-appearing, in no acute distress and not in pain.    Vital signs: (most recent): Blood pressure 146/91, pulse 64, temperature 97.9 °F (36.6 °C), temperature source Oral, resp. rate 18, height 167.6 cm (66\"), weight 136 kg (300 lb), SpO2 94 %.  No fever.    Output: Producing urine.    HEENT: Normal HEENT exam.    Lungs:  Normal effort and normal respiratory rate.  Breath sounds clear to auscultation.  He is not in respiratory distress.  No stridor.  No rales, decreased breath sounds, wheezes or rhonchi.    Heart: Normal rate.  Regular rhythm.  S1 normal and S2 normal.  No murmur or friction rub.   Chest: Symmetric chest wall expansion.   Abdomen: Abdomen is soft and non-distended.  Bowel sounds are normal.   There is no abdominal tenderness.     Extremities: (Decreased 3-4 out of 5  " strength on the right.)  Pulses: Distal pulses are intact.    Neurological: Patient is alert and oriented to person, place and time.    Pupils:  Pupils are equal, round, and reactive to light.    Skin:  Warm and dry.  No rash or cyanosis.               Results from last 7 days  Lab Units 08/06/18  0453 08/04/18  2221   WBC 10*3/mm3 12.02* 12.02*   HEMOGLOBIN g/dL 13.1 14.7   PLATELETS 10*3/mm3 276 291       Results from last 7 days  Lab Units 08/06/18  0453 08/04/18  2221   SODIUM mmol/L 143 142   POTASSIUM mmol/L 3.3* 3.7   CO2 mmol/L 26.0 26.0   BUN mg/dL 9 20   CREATININE mg/dL 0.68 1.17   MAGNESIUM mg/dL 2.1 2.2   PHOSPHORUS mg/dL 2.2* 1.8*   GLUCOSE mg/dL 108* 112*     Estimated Creatinine Clearance: 174.2 mL/min (by C-G formula based on SCr of 0.68 mg/dL).          I reviewed the patient's results and images.     Assessment/Plan   Impression      Principal Problem:    ICH (intracerebral hemorrhage) (CMS/[facility])  Active Problems:    Essential hypertension    H/O noncompliance with medical treatment, presenting hazards to health    Class 3 obesity       Plan        Increase lisinopril to 20 mg by mouth twice daily.  Wean Cardene.  Continue with physical therapy and occupational therapy.  Follow-up labs and orders have been placed for tomorrow morning.  Replace potassium.  We will plan to transition him from the intensive care unit when we are able to turn off the Cardene.    Plan of care and goals reviewed with mulitdisciplinary team at daily rounds.   I discussed the patient's findings and my recommendations with patient, family and nursing staff         Papo Melgar MD, [facility]  Pulmonary and Critical Care Medicine  08/06/18 12:08 PM       Electronically signed by Papo Melgar MD at 8/6/2018 12:11 PM       Consult Notes (last 24 hours) (Notes from 8/5/2018  3:51 PM through 8/6/2018  3:51 PM)     No notes of this type exist for this encounter.

## 2018-08-06 NOTE — PROGRESS NOTES
Clinical Nutrition Note      Patient Name: Valente Arndt Jr.  MRN: [id number]  Admission date: 8/4/2018      Reason for Assessment:  Multidisciplinary Rounds    Additional information obtained during MDR: RN reports pt adm w/ ICH r/t uncontrolled HTN, still has R hemiparesis; on cardene drip needs changed over  to po meds; eating about 75 % of diet.    Current diet: Diet Regular; Cardiac    Pertinent medical data reviewed    Intervention:  Menu provided, advised of alternate selections  Plan of care and goals reviewed    Monitor:  RD to follow per protocol      Dennise Collins MS,RD,LD  08/06/18 2:26 PM  Time: 20min

## 2018-08-06 NOTE — THERAPY TREATMENT NOTE
Acute Care - Physical Therapy Treatment Note  [facility]     Patient Name: Valente Arndt Jr.  : 1970  MRN: [id number]  Today's Date: 2018  Onset of Illness/Injury or Date of Surgery: 18  Date of Referral to PT: 18  Referring Physician: MD Hughes    Admit Date: 2018    Visit Dx:    ICD-10-CM ICD-9-CM   1. Nontraumatic hemorrhage of left cerebral hemisphere (CMS/HCC) I61.2 431   2. Severe uncontrolled hypertension I10 401.9   3. Weakness R53.1 780.79   4. Dysarthria R47.1 784.51   5. Impaired mobility and ADLs Z74.09 799.89   6. Impaired functional mobility, balance, gait, and endurance Z74.09 V49.89     Patient Active Problem List   Diagnosis   • ICH (intracerebral hemorrhage) (CMS/HCC)   • Essential hypertension   • H/O noncompliance with medical treatment, presenting hazards to health   • Class 3 obesity       Therapy Treatment          Rehabilitation Treatment Summary     Row Name 18 1332             Treatment Time/Intention    Discipline physical therapist  -KR      Document Type therapy note (daily note)  -KR      Subjective Information no complaints  -KR      Mode of Treatment physical therapy  -KR      Care Plan Review care plan/treatment goals reviewed;risks/benefits reviewed;patient/other agree to care plan  -KR      Care Plan Review, Other Participant(s) family  -KR      Therapy Frequency (PT Clinical Impression) daily  -KR      Patient Effort good  -KR      Existing Precautions/Restrictions fall;other (see comments)   R sided weakness  -KR      Recorded by [KR] Dana Zhu, PT 18 1552      Row Name 18 1332             Vital Signs    Pre Systolic BP Rehab 161  -KR      Pre Treatment Diastolic BP 95  -KR      Post Systolic BP Rehab 151  -KR      Post Treatment Diastolic   -KR      Pretreatment Heart Rate (beats/min) 82  -KR      Posttreatment Heart Rate (beats/min) 86  -KR      Pre SpO2 (%) 97  -KR      O2 Delivery Pre Treatment room air  -KR       Post SpO2 (%) 96  -KR      O2 Delivery Post Treatment room air  -KR      Pre Patient Position Sitting  -KR      Intra Patient Position Standing  -KR      Post Patient Position Sitting  -KR      Recorded by [KR] Dana Zhu, PT 08/06/18 1552      Row Name 08/06/18 1332             Cognitive Assessment/Intervention    Additional Documentation Cognitive Assessment/Intervention (Group)  -KR      Recorded by [KR] Dana Zhu, PT 08/06/18 1552      Row Name 08/06/18 1332             Cognitive Assessment/Intervention- PT/OT    Affect/Mental Status (Cognitive) flat/blunted affect  -KR      Orientation Status (Cognition) oriented x 4  -KR      Follows Commands (Cognition) WFL  -KR      Cognitive Function (Cognitive) safety deficit  -KR      Safety Deficit (Cognitive) mild deficit;ability to follow commands;awareness of need for assistance;insight into deficits/self awareness;safety precautions awareness;safety precautions follow-through/compliance  -KR      Personal Safety Interventions fall prevention program maintained;gait belt;nonskid shoes/slippers when out of bed  -KR      Recorded by [KR] Dana Zhu, PT 08/06/18 1552      Row Name 08/06/18 1332             Safety Issues, Functional Mobility    Safety Issues Affecting Function (Mobility) ability to follow commands;awareness of need for assistance;insight into deficits/self awareness;safety precaution awareness;safety precautions follow-through/compliance  -KR      Impairments Affecting Function (Mobility) balance;coordination;endurance/activity tolerance;range of motion (ROM);strength  -KR      Recorded by [KR] Dana Zhu, PT 08/06/18 1552      Row Name 08/06/18 1332             Bed Mobility Assessment/Treatment    Comment (Bed Mobility) UIC  -KR      Recorded by [KR] Dana Zhu, PT 08/06/18 1552      Row Name 08/06/18 1332             Transfer Assessment/Treatment    Transfer Assessment/Treatment sit-stand transfer;stand-sit transfer  -KR       Comment (Transfers) VC's for sequencing.   -KR      Recorded by [KR] Dana Zhu, PT 08/06/18 1552      Row Name 08/06/18 1332             Sit-Stand Transfer    Sit-Stand [place] (Transfers) minimum assist (75% patient effort);2 person assist;verbal cues  -KR      Recorded by [KR] Dana Zhu, PT 08/06/18 1552      Row Name 08/06/18 1332             Stand-Sit Transfer    Stand-Sit [place] (Transfers) minimum assist (75% patient effort);2 person assist;verbal cues  -KR      Recorded by [KR] Dana Zhu, PT 08/06/18 1552      Row Name 08/06/18 1332             Gait/Stairs Assessment/Training    Gait/Stairs Assessment/Training gait/ambulation independence  -KR      [place] Level (Gait) minimum assist (75% patient effort);2 person assist;verbal cues   periods of modA x2 with near LOB to the R  -KR      Assistive Device (Gait) other (see comments)   R UE support  -KR      Distance in Feet (Gait) 250  -KR      Pattern (Gait) step-to  -KR      Deviations/Abnormal Patterns (Gait) right sided deviations;antalgic;nancy decreased;other (see comments)   decreased step length  -KR      Right Sided Gait Deviations foot drop/toe drag;leans right  -KR      Comment (Gait/Stairs) Pt demonstrates step to gait pattern with slow nancy. Pt demonstrated decreased step length on the R with toe drag and lateral lean to the R. Pt required R UE support for increased stability.   -KR      Recorded by [KR] Dana Zhu, PT 08/06/18 1552      Row Name 08/06/18 1332             Motor Skills Assessment/Interventions    Additional Documentation Balance (Group);Therapeutic Exercise (Group)  -KR      Recorded by [KR] Dana Zhu, PT 08/06/18 1557      Row Name 08/06/18 1332             Therapeutic Exercise    Therapeutic Exercise seated, lower extremities  -KR      Additional Documentation Therapeutic Exercise (Row)  -KR      45356 - PT Therapeutic Exercise Minutes 5  -KR      89136 - PT Therapeutic Activity  Minutes 18  -KR      Recorded by [KR] Dana Zhu, PT 08/06/18 1557      Row Name 08/06/18 1332             Lower Extremity Seated Therapeutic Exercise    Performed, Seated Lower Extremity (Therapeutic Exercise) hip flexion/extension;ankle dorsiflexion/plantarflexion;LAQ (long arc quad), knee extension  -KR      Exercise Type, Seated Lower Extremity (Therapeutic Exercise) AAROM (active assistive range of motion)  -KR      Sets/Reps Detail, Seated Lower Extremity (Therapeutic Exercise) RLE 15x each  -KR      Recorded by [KR] Dana Zhu, PT 08/06/18 1557      Row Name 08/06/18 1332             Balance    Balance static sitting balance;static standing balance  -KR      Recorded by [KR] Dana Zhu, PT 08/06/18 1557      Row Name 08/06/18 1332             Static Sitting Balance    Level of [place] (Unsupported Sitting, Static Balance) supervision  -KR      Sitting Position (Unsupported Sitting, Static Balance) sitting in chair  -KR      Recorded by [KR] Dana Zhu, PT 08/06/18 1557      Row Name 08/06/18 1332             Static Standing Balance    Level of [place] (Supported Standing, Static Balance) minimal assist, 75% patient effort  -KR      Assistive Device Utilized (Supported Standing, Static Balance) other (see comments)   R UE support  -KR      Recorded by [KR] Dana Zhu, PT 08/06/18 1557      Row Name 08/06/18 1332             Positioning and Restraints    Pre-Treatment Position sitting in chair/recliner  -KR      Post Treatment Position chair  -KR      In Chair notified nsg;reclined;call light within reach;encouraged to call for assist;with family/caregiver;RUE elevated;legs elevated  -KR      Recorded by [KR] Dana Zhu, PT 08/06/18 1557      Row Name 08/06/18 1332             Pain Assessment    Additional Documentation Pain Scale: Numbers Pre/Post-Treatment (Group)  -KR      Recorded by [KR] Dana Zhu, PT 08/06/18 1557      Row Name 08/06/18 1332             Pain  Scale: Numbers Pre/Post-Treatment    Pain Scale: Numbers, Pretreatment 0/10 - no pain  -KR      Pain Scale: Numbers, Post-Treatment 0/10 - no pain  -KR      Recorded by [KR] Dana Zhu, PT 08/06/18 1557      Row Name 08/06/18 1332             Plan of Care Review    Plan of Care Reviewed With patient  -KR      Recorded by [KR] Dana Zhu, PT 08/06/18 1557      Row Name 08/06/18 1332             Outcome Summary/Treatment Plan (PT)    Daily Summary of Progress (PT) progress toward functional goals as expected  -KR      Recorded by [KR] Dana Zhu, PT 08/06/18 1557        User Key  (r) = Recorded By, (t) = Taken By, (c) = Cosigned By    Initials Name Effective Dates Discipline    Dana Moreno, PT 04/03/18 -  PT                     Physical Therapy Education     Title: PT OT SLP Therapies (Active)     Topic: Physical Therapy (Active)     Point: Mobility training (Active)    Learning Progress Summary     Learner Status Readiness Method Response Comment Documented by    Patient Active Acceptance E NR  KR 08/06/18 1557     Active Acceptance E NR  VENITA 08/05/18 1335          Point: Home exercise program (Active)    Learning Progress Summary     Learner Status Readiness Method Response Comment Documented by    Patient Active Acceptance E NR  KR 08/06/18 1557     Active Acceptance E NR  VENITA 08/05/18 1335          Point: Body mechanics (Active)    Learning Progress Summary     Learner Status Readiness Method Response Comment Documented by    Patient Active Acceptance E NR  KR 08/06/18 1557     Active Acceptance E NR  VENITA 08/05/18 1335          Point: Precautions (Active)    Learning Progress Summary     Learner Status Readiness Method Response Comment Documented by    Patient Active Acceptance E NR  KR 08/06/18 1557     Active Acceptance E NR  VENITA 08/05/18 1335                      User Key     Initials Effective Dates Name Provider Type Discipline    VENITA 06/19/15 -  Sharona Rondon, PT Physical Therapist PT     KR 04/03/18 -  Dana Zhu, PT Physical Therapist PT                    PT Recommendation and Plan  Therapy Frequency (PT Clinical Impression): daily  Outcome Summary/Treatment Plan (PT)  Daily Summary of Progress (PT): progress toward functional goals as expected  Plan of Care Reviewed With: patient  Progress: no change  Outcome Summary: Pt ambulated 250ft with Jesus x2 and R UE support. Pt demonstrated slow nancy with toe drag on the R and lateral lean to the R. Pt required R UE support for increased stability. Continue to progress as appropriate.           Outcome Measures     Row Name 08/06/18 1332 08/05/18 1335 08/05/18 0818       How much help from another person do you currently need...    Turning from your back to your side while in flat bed without using bedrails? 4  -KR 4  -VENITA  --    Moving from lying on back to sitting on the side of a flat bed without bedrails? 3  -KR 3  -VENITA  --    Moving to and from a bed to a chair (including a wheelchair)? 3  -KR 3  -VENITA  --    Standing up from a chair using your arms (e.g., wheelchair, bedside chair)? 3  -KR 3  -VENITA  --    Climbing 3-5 steps with a railing? 2  -KR 2  -VENITA  --    To walk in hospital room? 2  -KR 2  -VENITA  --    AM-PAC 6 Clicks Score 17  -KR 17  -VENITA  --       How much help from another is currently needed...    Putting on and taking off regular lower body clothing?  --  -- 2  -JR    Bathing (including washing, rinsing, and drying)  --  -- 2  -JR    Toileting (which includes using toilet bed pan or urinal)  --  -- 2  -JR    Putting on and taking off regular upper body clothing  --  -- 2  -JR    Taking care of personal grooming (such as brushing teeth)  --  -- 2  -JR    Eating meals  --  -- 1  -JR    Score  --  -- 11  -JR       Modified [place] Scale    Modified [place] Scale  -- 4 - Moderately severe disability.  Unable to walk without assistance, and unable to attend to own bodily needs without assistance.  -VENITA 4 - Moderately severe disability.   Unable to walk without assistance, and unable to attend to own bodily needs without assistance.  -JR       Functional Assessment    Outcome Measure Options AM-PAC 6 Clicks Basic Mobility (PT)  -KR AM-PAC 6 Clicks Basic Mobility (PT)  -VENITA AM-PAC 6 Clicks Daily Activity (OT);Modified [place]  -JR      User Key  (r) = Recorded By, (t) = Taken By, (c) = Cosigned By    Initials Name Provider Type    Sharona Mendez, PT Physical Therapist    JR Lita Kaminski, OT Occupational Therapist    Dana Moreno, PT Physical Therapist           Time Calculation:         PT Charges     Row Name 08/06/18 1332             Time Calculation    Start Time 1332  -KR      PT Received On 08/06/18  -KR      PT Goal Re-Cert Due Date 08/15/18  -KR         Time Calculation- PT    Total Timed Code Minutes- PT 23 minute(s)  -KR         Timed Charges    88103 - PT Therapeutic Exercise Minutes 5  -KR      29316 - PT Therapeutic Activity Minutes 18  -KR        User Key  (r) = Recorded By, (t) = Taken By, (c) = Cosigned By    Initials Name Provider Type    Dana Moreno, PT Physical Therapist        Therapy Suggested Charges     Code   Minutes Charges    84598 (CPT®) Hc Pt Neuromusc Re Education Ea 15 Min      13183 (CPT®) Hc Pt Ther Proc Ea 15 Min 5 1    73405 (CPT®) Hc Gait Training Ea 15 Min      87684 (CPT®) Hc Pt Therapeutic Act Ea 15 Min 18 1    59808 (CPT®) Hc Pt Manual Therapy Ea 15 Min      65772 (CPT®) Hc Pt Iontophoresis Ea 15 Min      12343 (CPT®) Hc Pt Elec Stim Ea-Per 15 Min      10685 (CPT®) Hc Pt Ultrasound Ea 15 Min      63904 (CPT®) Hc Pt Self Care/Mgmt/Train Ea 15 Min      Total  23 2        Therapy Charges for Today     Code Description Service Date Service Provider Modifiers Qty    48780399768 HC PT THER PROC EA 15 MIN 8/6/2018 Dana Zhu, PT GP 1    81703714558 HC PT THERAPEUTIC ACT EA 15 MIN 8/6/2018 Dana Zhu, PT GP 1    76793980415 HC PT THER SUPP EA 15 MIN 8/6/2018 Dana Zhu, PT GP 2           PT G-Codes  Outcome Measure Options: AM-PAC 6 Clicks Basic Mobility (PT)    Catrachita Zhu PT  8/6/2018

## 2018-08-06 NOTE — PLAN OF CARE
Problem: Patient Care Overview  Goal: Plan of Care Review  Outcome: Ongoing (interventions implemented as appropriate)   08/06/18 8132   Coping/Psychosocial   Plan of Care Reviewed With patient   Plan of Care Review   Progress no change   OTHER   Outcome Summary Pt ambulated 250ft with Jesus x2 and R UE support. Pt demonstrated slow nancy with toe drag on the R and lateral lean to the R. Pt required R UE support for increased stability. Continue to progress as appropriate.

## 2018-08-07 LAB
ANION GAP SERPL CALCULATED.3IONS-SCNC: 8 MMOL/L (ref 3–11)
BASOPHILS # BLD AUTO: 0.05 10*3/MM3 (ref 0–0.2)
BASOPHILS NFR BLD AUTO: 0.4 % (ref 0–1)
BUN BLD-MCNC: 15 MG/DL (ref 9–23)
BUN/CREAT SERPL: 15.8 (ref 7–25)
CALCIUM SPEC-SCNC: 9.3 MG/DL (ref 8.7–10.4)
CHLORIDE SERPL-SCNC: 106 MMOL/L (ref 99–109)
CO2 SERPL-SCNC: 24 MMOL/L (ref 20–31)
CREAT BLD-MCNC: 0.95 MG/DL (ref 0.6–1.3)
DEPRECATED RDW RBC AUTO: 48.7 FL (ref 37–54)
EOSINOPHIL # BLD AUTO: 0.16 10*3/MM3 (ref 0–0.3)
EOSINOPHIL NFR BLD AUTO: 1.2 % (ref 0–3)
ERYTHROCYTE [DISTWIDTH] IN BLOOD BY AUTOMATED COUNT: 15.2 % (ref 11.3–14.5)
GFR SERPL CREATININE-BSD FRML MDRD: 85 ML/MIN/1.73
GLUCOSE BLD-MCNC: 98 MG/DL (ref 70–100)
HCT VFR BLD AUTO: 43.7 % (ref 38.9–50.9)
HGB BLD-MCNC: 14.2 G/DL (ref 13.1–17.5)
IMM GRANULOCYTES # BLD: 0.03 10*3/MM3 (ref 0–0.03)
IMM GRANULOCYTES NFR BLD: 0.2 % (ref 0–0.6)
LYMPHOCYTES # BLD AUTO: 2.24 10*3/MM3 (ref 0.6–4.8)
LYMPHOCYTES NFR BLD AUTO: 17 % (ref 24–44)
MCH RBC QN AUTO: 28.7 PG (ref 27–31)
MCHC RBC AUTO-ENTMCNC: 32.5 G/DL (ref 32–36)
MCV RBC AUTO: 88.3 FL (ref 80–99)
MONOCYTES # BLD AUTO: 0.88 10*3/MM3 (ref 0–1)
MONOCYTES NFR BLD AUTO: 6.7 % (ref 0–12)
NEUTROPHILS # BLD AUTO: 9.84 10*3/MM3 (ref 1.5–8.3)
NEUTROPHILS NFR BLD AUTO: 74.7 % (ref 41–71)
PLATELET # BLD AUTO: 277 10*3/MM3 (ref 150–450)
PMV BLD AUTO: 11.8 FL (ref 6–12)
POTASSIUM BLD-SCNC: 4 MMOL/L (ref 3.5–5.5)
RBC # BLD AUTO: 4.95 10*6/MM3 (ref 4.2–5.76)
SODIUM BLD-SCNC: 138 MMOL/L (ref 132–146)
WBC NRBC COR # BLD: 13.17 10*3/MM3 (ref 3.5–10.8)

## 2018-08-07 PROCEDURE — 97116 GAIT TRAINING THERAPY: CPT

## 2018-08-07 PROCEDURE — 97110 THERAPEUTIC EXERCISES: CPT

## 2018-08-07 PROCEDURE — 99233 SBSQ HOSP IP/OBS HIGH 50: CPT | Performed by: INTERNAL MEDICINE

## 2018-08-07 PROCEDURE — 80048 BASIC METABOLIC PNL TOTAL CA: CPT | Performed by: INTERNAL MEDICINE

## 2018-08-07 PROCEDURE — 97530 THERAPEUTIC ACTIVITIES: CPT

## 2018-08-07 PROCEDURE — 85025 COMPLETE CBC W/AUTO DIFF WBC: CPT | Performed by: INTERNAL MEDICINE

## 2018-08-07 RX ORDER — HYDRALAZINE HYDROCHLORIDE 50 MG/1
50 TABLET, FILM COATED ORAL EVERY 8 HOURS SCHEDULED
Status: DISCONTINUED | OUTPATIENT
Start: 2018-08-07 | End: 2018-08-09 | Stop reason: HOSPADM

## 2018-08-07 RX ADMIN — HYDRALAZINE HYDROCHLORIDE 50 MG: 50 TABLET, FILM COATED ORAL at 22:17

## 2018-08-07 RX ADMIN — LABETALOL HYDROCHLORIDE 10 MG: 5 INJECTION INTRAVENOUS at 01:07

## 2018-08-07 RX ADMIN — LABETALOL HYDROCHLORIDE 10 MG: 5 INJECTION INTRAVENOUS at 16:51

## 2018-08-07 RX ADMIN — HYDRALAZINE HYDROCHLORIDE 25 MG: 25 TABLET ORAL at 05:46

## 2018-08-07 RX ADMIN — HYDRALAZINE HYDROCHLORIDE 50 MG: 50 TABLET, FILM COATED ORAL at 14:41

## 2018-08-07 RX ADMIN — LISINOPRIL 20 MG: 20 TABLET ORAL at 21:23

## 2018-08-07 RX ADMIN — LISINOPRIL 20 MG: 20 TABLET ORAL at 08:10

## 2018-08-07 NOTE — PROGRESS NOTES
Clinical Nutrition Note      Patient Name: Valente Arndt Jr.  MRN: [id number]  Admission date: 8/4/2018      Reason for Assessment:  Multidisciplinary Rounds    Additional information obtained during MDR:  RN reports pt doing well; medications adjusted for HTN off cardene drip; pt ready for transfer to floor or rehab.    Current diet: Diet Regular; Cardiac    Pertinent medical data reviewed    Intervention:  Plan of care and goals reviewed    Monitor:  RD to follow per protocol      Dennise Collins MS,RD,LD  08/07/18 3:24 PM  Time: 20min

## 2018-08-07 NOTE — THERAPY TREATMENT NOTE
Acute Care - Occupational Therapy Treatment Note  [facility]     Patient Name: Valente Arndt Jr.  : 1970  MRN: [id number]  Today's Date: 2018  Onset of Illness/Injury or Date of Surgery: 18  Date of Referral to OT: 18  Referring Physician: MD Hughes    Admit Date: 2018       ICD-10-CM ICD-9-CM   1. Nontraumatic hemorrhage of left cerebral hemisphere (CMS/HCC) I61.2 431   2. Severe uncontrolled hypertension I10 401.9   3. Weakness R53.1 780.79   4. Dysarthria R47.1 784.51   5. Impaired mobility and ADLs Z74.09 799.89   6. Impaired functional mobility, balance, gait, and endurance Z74.09 V49.89     Patient Active Problem List   Diagnosis   • ICH (intracerebral hemorrhage) (CMS/HCC)   • Essential hypertension   • H/O noncompliance with medical treatment, presenting hazards to health   • Class 3 obesity     History reviewed. No pertinent past medical history.  Past Surgical History:   Procedure Laterality Date   • APPENDECTOMY     • FOOT SURGERY         Therapy Treatment          Rehabilitation Treatment Summary     Row Name 18 1316             Treatment Time/Intention    Discipline occupational therapist  -AN      Document Type therapy note (daily note)  -AN      Subjective Information no complaints  -AN      Mode of Treatment occupational therapy  -AN2      Patient/Family Observations mother and Daughter present; pt reclined in chair  -AN2      Care Plan Review risks/benefits reviewed;care plan/treatment goals reviewed  -AN2      Patient Effort good  -AN2      Existing Precautions/Restrictions fall  -AN2      Equipment Issued to Patient --   foam blocks; built up utensil not available  -AN2      Recorded by [AN] Stacey Garcia OT 18 1354  [AN2] Stacey Garcia OT 18 1402      Row Name 18 1316             Vital Signs    Pre Systolic BP Rehab 155  -AN      Pre Treatment Diastolic   -AN      Post Systolic BP Rehab 165  -AN      Post Treatment Diastolic BP 94   -AN      Pretreatment Heart Rate (beats/min) 63  -AN      Posttreatment Heart Rate (beats/min) 65  -AN      Pre SpO2 (%) 97  -AN      O2 Delivery Pre Treatment room air  -AN      Post SpO2 (%) 97  -AN      O2 Delivery Post Treatment room air  -AN      Recorded by [AN] Stacey Garcia, OT 08/07/18 1402      Row Name 08/07/18 1316             Cognitive Assessment/Intervention- PT/OT    Orientation Status (Cognition) oriented x 4  -AN      Follows Commands (Cognition) WFL  -AN      Cognitive Function (Cognitive) safety deficit  -AN      Recorded by [AN] Stacey Garcia, OT 08/07/18 1402      Row Name 08/07/18 1316             Bed Mobility Assessment/Treatment    Comment (Bed Mobility) pt UIC  -AN      Recorded by [AN] Stacey Garcia, OT 08/07/18 1402      Row Name 08/07/18 1316             Sit-Stand Transfer    Sit-Stand [place] (Transfers) minimum assist (75% patient effort)  -AN      Assistive Device (Sit-Stand Transfers) walker, front-wheeled  -AN      Recorded by [AN] Stacey Garcia, OT 08/07/18 1402      Row Name 08/07/18 1316             Stand-Sit Transfer    Stand-Sit [place] (Transfers) verbal cues;contact guard  -AN      Assistive Device (Stand-Sit Transfers) walker, front-wheeled  -AN      Recorded by [AN] Stacey Garcia, OT 08/07/18 1402      Row Name 08/07/18 1316             ADL Assessment/Intervention    BADL Assessment/Intervention lower body dressing;grooming  -AN      Recorded by [AN] Stacey Garcia, OT 08/07/18 1402      Row Name 08/07/18 1316             Lower Body Dressing Assessment/Training    Lower Body Dressing [place] Level don;pants/bottoms;moderate assist (50% patient effort)  -AN      Lower Body Dressing Position supported sitting;supported standing  -AN      Comment (Lower Body Dressing) assist to thread R LE, some with L and pull up  -AN      Recorded by [AN] Stacey Garcia, OT 08/07/18 1402      Row Name 08/07/18 1316             Grooming Assessment/Training    [place]  Level (Grooming) wash face, hands  -AN      Grooming Position supported sitting  -AN      Comment (Grooming) with encouraged assist of R hand  -AN      Recorded by [AN] Stacey Garcia, OT 08/07/18 1402      Row Name 08/07/18 1316             Motor Skills Assessment/Interventions    Additional Documentation Therapeutic Exercise (Group)  -AN      Recorded by [AN] Stacey Garcia, OT 08/07/18 1402      Row Name 08/07/18 1316             Therapeutic Exercise    51085 - OT Therapeutic Exercise Minutes 10  -AN      25348 - OT Therapeutic Activity Minutes 14  -AN      Recorded by [AN] Stacey Garcia, OT 08/07/18 1402      Row Name 08/07/18 1316             Therapeutic Exercise    Upper Extremity Range of Motion (Therapeutic Exercise) shoulder abduction/adduction, right;shoulder horizontal abduction/adduction, right;shoulder internal/external rotation, right;elbow flexion/extension, right;forearm supination/pronation, right;wrist flexion/extension, right  -AN      Exercise Type (Therapeutic Exercise) AROM (active range of motion);AAROM (active assistive range of motion)  -AN      Position (Therapeutic Exercise) seated  -AN      Sets/Reps (Therapeutic Exercise) 1/10  -AN      Recorded by [AN] Stacey Garcia, OT 08/07/18 1402      Row Name 08/07/18 1316             Gross Motor Coordination    Gross Motor Impairments finger to nose;coordination  -AN      Recorded by [AN] Stacey Garcia, OT 08/07/18 1402      Row Name 08/07/18 1316             Balance    Balance dynamic standing balance;static standing balance  -AN      Recorded by [AN] Stacey Garcia, OT 08/07/18 1402      Row Name 08/07/18 1316             Static Standing Balance    Level of [place] (Supported Standing, Static Balance) contact guard assist  -AN      Time Able to Maintain Position (Supported Standing, Static Balance) 2 to 3 minutes  -AN      Assistive Device Utilized (Supported Standing, Static Balance) rolling walker  -AN      Recorded by [AN] Jose  Stacey PERRIN, OT 08/07/18 1402      Row Name 08/07/18 1316             Dynamic Standing Balance    Level of [place], Reaches Outside Midline (Standing, Dynamic Balance) contact guard assist  -AN      Time Able to Maintain Position, Reaches Outside Midline (Standing, Dynamic Balance) 1 to 2 minutes  -AN      Comment, Reaches Outside Midline (Standing, Dynamic Balance) with RW, L/R weight shift  -AN      Recorded by [AN] Stacey Garcia, OT 08/07/18 1402      Row Name 08/07/18 1316             Positioning and Restraints    Pre-Treatment Position sitting in chair/recliner  -AN      Post Treatment Position chair  -AN      In Chair reclined;call light within reach;encouraged to call for assist;with family/caregiver  -AN      Recorded by [AN] Stacey Garcia, OT 08/07/18 1402      Row Name 08/07/18 1316             Pain Scale: Numbers Pre/Post-Treatment    Pain Scale: Numbers, Pretreatment 0/10 - no pain  -AN      Pain Scale: Numbers, Post-Treatment 0/10 - no pain  -AN      Recorded by [AN] Stacey Garcia, OT 08/07/18 1402      Row Name 08/07/18 1316             Outcome Summary/Treatment Plan (OT)    Daily Summary of Progress (OT) progress toward functional goals as expected  -AN      Plan for Continued Treatment (OT) continue POC  -AN      Anticipated Discharge Disposition (OT) home with assist  -AN      Recorded by [AN] Stacey Garcia, OT 08/07/18 1402        User Key  (r) = Recorded By, (t) = Taken By, (c) = Cosigned By    Initials Name Effective Dates Discipline    AN Stacey Garcia, OT 06/22/15 -  OT               Occupational Therapy Education     Title: PT OT SLP Therapies (Active)     Topic: Occupational Therapy (Active)     Point: ADL training (Done)     Description: Instruct learner(s) on proper safety adaptation and remediation techniques during self care or transfers.   Instruct in proper use of assistive devices.   Learning Progress Summary     Learner Status Readiness Method Response Comment Documented by     Patient Done Acceptance E,D VU,DU,NR ADL retraining and incrased use of R UE  08/07/18 1404     Done Acceptance E VU Educated pt regarding role of therapy, limb protection, safety precautions, transfer techniques and encouraged use of call kaplan.  08/05/18 0904                      User Key     Initials Effective Dates Name Provider Type Discipline     06/22/15 -  Stacey Garcia, OT Occupational Therapist OT     06/22/15 -  Lita Kaminski, OT Occupational Therapist OT                OT Recommendation and Plan  Outcome Summary/Treatment Plan (OT)  Daily Summary of Progress (OT): progress toward functional goals as expected  Plan for Continued Treatment (OT): continue POC  Anticipated Discharge Disposition (OT): home with assist  Daily Summary of Progress (OT): progress toward functional goals as expected  Plan of Care Review  Plan of Care Reviewed With: patient  Plan of Care Reviewed With: patient  Outcome Summary: Pt demonstrated increased I with functional balance and ADL I. Pt CGA for standing and mod assist LB ADL's. Continue OT until txfr to IRF.        Outcome Measures     Row Name 08/07/18 1316 08/06/18 1332 08/05/18 1335       How much help from another person do you currently need...    Turning from your back to your side while in flat bed without using bedrails?  -- 4  -KR 4  -VENITA    Moving from lying on back to sitting on the side of a flat bed without bedrails?  -- 3  -KR 3  -VENITA    Moving to and from a bed to a chair (including a wheelchair)?  -- 3  -KR 3  -VENITA    Standing up from a chair using your arms (e.g., wheelchair, bedside chair)?  -- 3  -KR 3  -VENITA    Climbing 3-5 steps with a railing?  -- 2  -KR 2  -VENITA    To walk in hospital room?  -- 2  -KR 2  -VENITA    AM-PAC 6 Clicks Score  -- 17  -KR 17  -VENITA       How much help from another is currently needed...    Putting on and taking off regular lower body clothing? 2  -AN  --  --    Bathing (including washing, rinsing, and drying) 2  -AN  --  --     Toileting (which includes using toilet bed pan or urinal) 2  -AN  --  --    Putting on and taking off regular upper body clothing 2  -AN  --  --    Taking care of personal grooming (such as brushing teeth) 3  -AN  --  --    Eating meals 2  -AN  --  --    Score 13  -AN  --  --       Modified [place] Scale    Modified [place] Scale 4 - Moderately severe disability.  Unable to walk without assistance, and unable to attend to own bodily needs without assistance.  -AN  -- 4 - Moderately severe disability.  Unable to walk without assistance, and unable to attend to own bodily needs without assistance.  -VENITA       Functional Assessment    Outcome Measure Options  -- AM-PAC 6 Clicks Basic Mobility (PT)  -KR AM-PAC 6 Clicks Basic Mobility (PT)  -VENITA    Row Name 08/05/18 0818             How much help from another is currently needed...    Putting on and taking off regular lower body clothing? 2  -JR      Bathing (including washing, rinsing, and drying) 2  -JR      Toileting (which includes using toilet bed pan or urinal) 2  -JR      Putting on and taking off regular upper body clothing 2  -JR      Taking care of personal grooming (such as brushing teeth) 2  -JR      Eating meals 1  -JR      Score 11  -JR         Modified [place] Scale    Modified [place] Scale 4 - Moderately severe disability.  Unable to walk without assistance, and unable to attend to own bodily needs without assistance.  -         Functional Assessment    Outcome Measure Options AM-PAC 6 Clicks Daily Activity (OT);Modified [place]  -JR        User Key  (r) = Recorded By, (t) = Taken By, (c) = Cosigned By    Initials Name Provider Type    VENITA Sharona Rondon, PT Physical Therapist    Stacey Unger, OT Occupational Therapist    Lita Granados, OT Occupational Therapist    Dana Moreno, PT Physical Therapist           Time Calculation:         Time Calculation- OT     Row Name 08/07/18 1407 08/07/18 1316          Time Calculation- OT    OT Start  Time 1316  -AN  --     Total Timed Code Minutes- OT 24 minute(s)  -AN  --     OT Received On 08/07/18  -AN  --     OT Goal Re-Cert Due Date 08/15/18  -AN  --        Timed Charges    82491 - OT Therapeutic Exercise Minutes  -- 10  -AN     36654 - OT Therapeutic Activity Minutes  -- 14  -AN       User Key  (r) = Recorded By, (t) = Taken By, (c) = Cosigned By    Initials Name Provider Type    AN Stacey Garcia OT Occupational Therapist           Therapy Suggested Charges     Code   Minutes Charges    86094 (CPT®) Hc Ot Neuromusc Re Education Ea 15 Min      69156 (CPT®) Hc Ot Ther Proc Ea 15 Min 10 1    34321 (CPT®) Hc Ot Therapeutic Act Ea 15 Min 14 1    58266 (CPT®) Hc Ot Manual Therapy Ea 15 Min      95378 (CPT®) Hc Ot Iontophoresis Ea 15 Min      66166 (CPT®) Hc Ot Elec Stim Ea-Per 15 Min      51240 (CPT®) Hc Ot Ultrasound Ea 15 Min      89540 (CPT®) Hc Ot Self Care/Mgmt/Train Ea 15 Min      Total  24 2        Therapy Charges for Today     Code Description Service Date Service Provider Modifiers Qty    65991871215 HC OT THER PROC EA 15 MIN 8/7/2018 Stacey Garcia OT GO 1    41915020738 HC OT THERAPEUTIC ACT EA 15 MIN 8/7/2018 Stacey Garcia OT GO 1               Stacey Garcia OT  8/7/2018

## 2018-08-07 NOTE — PLAN OF CARE
Problem: Patient Care Overview  Goal: Plan of Care Review  Outcome: Ongoing (interventions implemented as appropriate)   08/07/18 0515   Coping/Psychosocial   Plan of Care Reviewed With patient;father   Plan of Care Review   Progress improving   OTHER   Outcome Summary Pt is maintaining adequate BP parameters off cardene (with addition of PRN antihypertensives). VSS and pt on transfer to the floor. Will continue to monitor.        Problem: Fall Risk (Adult)  Goal: Absence of Fall  Outcome: Ongoing (interventions implemented as appropriate)      Problem: Skin Injury Risk (Adult)  Goal: Skin Health and Integrity  Outcome: Ongoing (interventions implemented as appropriate)

## 2018-08-07 NOTE — PLAN OF CARE
Problem: Patient Care Overview  Goal: Plan of Care Review  Outcome: Ongoing (interventions implemented as appropriate)   08/07/18 0910   Coping/Psychosocial   Plan of Care Reviewed With patient   Plan of Care Review   Progress declining   OTHER   Outcome Summary Pt ambulated 160 ft, min A x2 using FWW. Pt demonstrated increased difficulty maintaining adequate balance to ambulate w/o use of AD. Pt limited from decreased proprioception in RLE, fatigue and decreased awareness of deficits. Pt would benefit from cont'd skilled PT services.

## 2018-08-07 NOTE — THERAPY TREATMENT NOTE
Acute Care - Physical Therapy Treatment Note  [facility]     Patient Name: Valente Arndt Jr.  : 1970  MRN: [id number]  Today's Date: 2018  Onset of Illness/Injury or Date of Surgery: 18  Date of Referral to PT: 18  Referring Physician: MD Hughes    Admit Date: 2018    Visit Dx:    ICD-10-CM ICD-9-CM   1. Nontraumatic hemorrhage of left cerebral hemisphere (CMS/HCC) I61.2 431   2. Severe uncontrolled hypertension I10 401.9   3. Weakness R53.1 780.79   4. Dysarthria R47.1 784.51   5. Impaired mobility and ADLs Z74.09 799.89   6. Impaired functional mobility, balance, gait, and endurance Z74.09 V49.89     Patient Active Problem List   Diagnosis   • ICH (intracerebral hemorrhage) (CMS/HCC)   • Essential hypertension   • H/O noncompliance with medical treatment, presenting hazards to health   • Class 3 obesity       Therapy Treatment          Rehabilitation Treatment Summary     Row Name 18 1540 18 1316          Treatment Time/Intention    Discipline (P)  physical therapist  -CM occupational therapist  -AN     Document Type (P)  therapy note (daily note)  -CM therapy note (daily note)  -AN     Subjective Information (P)  no complaints  -CM no complaints  -AN     Mode of Treatment (P)  physical therapy  -CM occupational therapy  -AN2     Patient/Family Observations (P)  Family present   -CM mother and Daughter present; pt reclined in chair  -AN2     Care Plan Review (P)  evaluation/treatment results reviewed;care plan/treatment goals reviewed;risks/benefits reviewed;patient/other agree to care plan  -CM risks/benefits reviewed;care plan/treatment goals reviewed  -AN2     Patient Effort (P)  good  -CM good  -AN2     Existing Precautions/Restrictions (P)  fall;other (see comments)   R sided weakness   -CM2 fall  -AN2     Equipment Issued to Patient  -- --   foam blocks; built up utensil not available  -AN2     Recorded by [CM] Mariluz Bean, PT Student 18 1611  [CM2]  Mariluz Bean, PT Student 08/07/18 1619 [AN] JoseStacey, OT 08/07/18 1354  [AN2] Stacey Garcia, OT 08/07/18 1402     Row Name 08/07/18 1540 08/07/18 1316          Vital Signs    Pre Systolic BP Rehab (P)  165  -  -AN     Pre Treatment Diastolic BP (P)  94  -  -AN     Post Systolic BP Rehab (P)  166  -  -AN     Post Treatment Diastolic BP (P)  89  -CM 94  -AN     Pretreatment Heart Rate (beats/min) (P)  71  -CM 63  -AN     Posttreatment Heart Rate (beats/min) (P)  74  -CM 65  -AN     Pre SpO2 (%) (P)  94  -CM 97  -AN     O2 Delivery Pre Treatment (P)  room air  -CM room air  -AN     O2 Delivery Intra Treatment (P)  room air  -CM  --     Post SpO2 (%) (P)  96  -CM 97  -AN     O2 Delivery Post Treatment (P)  room air  -CM room air  -AN     Pre Patient Position (P)  Sitting  -CM  --     Intra Patient Position (P)  Standing  -CM  --     Post Patient Position (P)  Sitting  -CM  --     Rest Breaks  (P)  1   sitting   -CM  --     Recorded by [CM] Mariluz Bean, PT Student 08/07/18 1619 [AN] Jose, Stacey M, OT 08/07/18 1402     Row Name 08/07/18 1540             Cognitive Assessment/Intervention    Additional Documentation (P)  Cognitive Assessment/Intervention (Group)  -CM      Recorded by [CM] Mariluz Bean, PT Student 08/07/18 1619      Row Name 08/07/18 1540 08/07/18 1316          Cognitive Assessment/Intervention- PT/OT    Affect/Mental Status (Cognitive) (P)  flat/blunted affect  -CM  --     Orientation Status (Cognition) (P)  oriented x 4  -CM oriented x 4  -AN     Follows Commands (Cognition) (P)  WFL  -CM WFL  -AN     Cognitive Function (Cognitive) (P)  safety deficit  -CM safety deficit  -AN     Safety Deficit (Cognitive) (P)  mild deficit;safety precautions awareness;awareness of need for assistance;impulsivity  -CM  --     Personal Safety Interventions (P)  fall prevention program maintained;gait belt;nonskid shoes/slippers when out of bed  -CM  --     Recorded by [CM] Vikas,  Marliuz NIEVES, PT Student 08/07/18 1619 [AN] Stacey Garcia, OT 08/07/18 1402     Row Name 08/07/18 1540             Safety Issues, Functional Mobility    Safety Issues Affecting Function (Mobility) (P)  safety precautions follow-through/compliance;insight into deficits/self awareness;impulsivity;awareness of need for assistance  -CM      Recorded by [CM] Mariluz Bean, PT Student 08/07/18 1619      Row Name 08/07/18 1540 08/07/18 1316          Bed Mobility Assessment/Treatment    Comment (Bed Mobility) (P)  UIC   -CM pt UIC  -AN     Recorded by [CM] Mariluz Bean, PT Student 08/07/18 1619 [AN] Stacey Garcia, OT 08/07/18 1402     Row Name 08/07/18 1540             Transfer Assessment/Treatment    Transfer Assessment/Treatment (P)  sit-stand transfer;stand-sit transfer  -CM      Comment (Transfers) (P)  VC's for proper hand placement   -CM      Recorded by [CM] Mariluz Bean, PT Student 08/07/18 1619      Row Name 08/07/18 1540 08/07/18 1316          Sit-Stand Transfer    Sit-Stand [place] (Transfers) (P)  minimum assist (75% patient effort);1 person assist;verbal cues  -CM minimum assist (75% patient effort)  -AN     Assistive Device (Sit-Stand Transfers) (P)  walker, front-wheeled  -CM walker, front-wheeled  -AN     Recorded by [CM] Mariluz Bean, PT Student 08/07/18 1619 [AN] Stacey Garcia, OT 08/07/18 1402     Row Name 08/07/18 1540 08/07/18 1316          Stand-Sit Transfer    Stand-Sit [place] (Transfers) (P)  minimum assist (75% patient effort);1 person assist;verbal cues  -CM verbal cues;contact guard  -AN     Assistive Device (Stand-Sit Transfers) (P)  walker, front-wheeled  -CM walker, front-wheeled  -AN     Recorded by [CM] Mariluz Bean, PT Student 08/07/18 1619 [AN] Stacey Garcia, OT 08/07/18 1402     Row Name 08/07/18 1540             Gait/Stairs Assessment/Training    92840 - Gait Training Minutes  (P)  15  -CM      Gait/Stairs Assessment/Training (P)  gait/ambulation  assistive device  -CM2      [place] Level (Gait) (P)  minimum assist (75% patient effort);2 person assist;verbal cues  -CM2      Assistive Device (Gait) (P)  walker, front-wheeled  -CM2      Distance in Feet (Gait) (P)  160  -CM2      Pattern (Gait) (P)  step-through  -CM2      Deviations/Abnormal Patterns (Gait) (P)  base of support, narrow;stride length decreased;gait speed decreased  -CM2      Right Sided Gait Deviations (P)  foot drop/toe drag;leans right  -CM2      Comment (Gait/Stairs) (P)  Pt initially began ambulating w/ no AD, RUE support, min a X2 but demonstrated increased difficulty advancing R LE ; Pt was then instructed to use FWW; Pt req'd R HHA to maintain contact w/ walker when ambulating. Max VC's for inreased DF in RLE. VC's req'd to decrease gait speed     chair following behind for safety   -CM3      Recorded by [CM] Mariluz Bean, PT Student 08/07/18 1621  [CM2] Mariluz Bean, PT Student 08/07/18 1619  [CM3] Mariluz Bean, PT Student 08/07/18 1628      Row Name 08/07/18 1316             ADL Assessment/Intervention    BADL Assessment/Intervention lower body dressing;grooming  -AN      Recorded by [AN] Stacey Garcia, OT 08/07/18 1402      Row Name 08/07/18 1316             Lower Body Dressing Assessment/Training    Lower Body Dressing [place] Level don;pants/bottoms;moderate assist (50% patient effort)  -AN      Lower Body Dressing Position supported sitting;supported standing  -AN      Comment (Lower Body Dressing) assist to thread R LE, some with L and pull up  -AN      Recorded by [AN] Stacey Garcia, OT 08/07/18 1402      Row Name 08/07/18 1316             Grooming Assessment/Training    [place] Level (Grooming) wash face, hands  -AN      Grooming Position supported sitting  -AN      Comment (Grooming) with encouraged assist of R hand  -AN      Recorded by [AN] Stacey Garcia, OT 08/07/18 1402      Row Name 08/07/18 1540 08/07/18 1316          Motor Skills  Assessment/Interventions    Additional Documentation (P)  Balance (Group);Therapeutic Exercise (Group)  -CM Therapeutic Exercise (Group)  -AN     Recorded by [CM] Mariluz Bean, PT Student 08/07/18 1619 [AN] Stacey Garcia, OT 08/07/18 1402     Row Name 08/07/18 1540 08/07/18 1316          Therapeutic Exercise    Therapeutic Exercise (P)  seated, lower extremities;seated, upper extremities  -CM  --     19744 - PT Therapeutic Exercise Minutes (P)  10  -CM2  --     47746 - OT Therapeutic Exercise Minutes  -- 10  -AN     66648 - OT Therapeutic Activity Minutes  -- 14  -AN     Recorded by [CM] Mariluz Bean, PT Student 08/07/18 1619  [CM2] Mariluz Bean, PT Student 08/07/18 1621 [AN] Stacey Garcia, OT 08/07/18 1402     Row Name 08/07/18 1540             Upper Extremity Seated Therapeutic Exercise    Performed, Seated Upper Extremity (Therapeutic Exercise) (P)  elbow flexion/extension;digit flexion/extension  -CM      Exercise Type, Seated Upper Extremity (Therapeutic Exercise) (P)  AAROM (active assistive range of motion);AROM (active range of motion)   AAROM RUE   -CM      Sets/Reps Detail, Seated Upper Extremity (Therapeutic Exercise) (P)  1/10 BLEs   -CM      Recorded by [CM] Mariluz Bean, PT Student 08/07/18 1621      Row Name 08/07/18 1540             Lower Extremity Seated Therapeutic Exercise    Performed, Seated Lower Extremity (Therapeutic Exercise) (P)  LAQ (long arc quad), knee extension;ankle dorsiflexion/plantarflexion  -CM      Exercise Type, Seated Lower Extremity (Therapeutic Exercise) (P)  AROM (active range of motion);AAROM (active assistive range of motion)   AAROM RLE   -CM      Sets/Reps Detail, Seated Lower Extremity (Therapeutic Exercise) (P)  1/10 BLEs  -CM      Recorded by [CM] Mariluz Bean, PT Student 08/07/18 1621      Row Name 08/07/18 1316             Therapeutic Exercise    Upper Extremity Range of Motion (Therapeutic Exercise) shoulder abduction/adduction,  right;shoulder horizontal abduction/adduction, right;shoulder internal/external rotation, right;elbow flexion/extension, right;forearm supination/pronation, right;wrist flexion/extension, right  -AN      Exercise Type (Therapeutic Exercise) AROM (active range of motion);AAROM (active assistive range of motion)  -AN      Position (Therapeutic Exercise) seated  -AN      Sets/Reps (Therapeutic Exercise) 1/10  -AN      Recorded by [AN] Stacey Garcia, OT 08/07/18 1402      Row Name 08/07/18 1316             Gross Motor Coordination    Gross Motor Impairments finger to nose;coordination  -AN      Recorded by [AN] Stacey Garcia, OT 08/07/18 1402      Row Name 08/07/18 1540 08/07/18 1316          Balance    Balance (P)  static sitting balance;static standing balance;standing balance activity  -CM dynamic standing balance;static standing balance  -AN     Recorded by [CM] Mariluz Bean, PT Student 08/07/18 1621 [AN] Stacey Garcia, OT 08/07/18 1402     Row Name 08/07/18 1540             Static Sitting Balance    Level of [place] (Unsupported Sitting, Static Balance) (P)  supervision  -CM      Sitting Position (Unsupported Sitting, Static Balance) (P)  sitting in chair  -CM      Recorded by [CM] Mariluz Bean, PT Student 08/07/18 1623      Row Name 08/07/18 1540 08/07/18 1316          Static Standing Balance    Level of [place] (Supported Standing, Static Balance) (P)  contact guard assist  -CM contact guard assist  -AN     Time Able to Maintain Position (Supported Standing, Static Balance)  -- 2 to 3 minutes  -AN     Assistive Device Utilized (Supported Standing, Static Balance) (P)  rolling walker  -CM rolling walker  -AN     Recorded by [CM] Mariluz Bean, PT Student 08/07/18 1623 [AN] Stacey Garcia, OT 08/07/18 1402     Row Name 08/07/18 1316             Dynamic Standing Balance    Level of [place], Reaches Outside Midline (Standing, Dynamic Balance) contact guard assist  -AN      Time Able to  Maintain Position, Reaches Outside Midline (Standing, Dynamic Balance) 1 to 2 minutes  -AN      Comment, Reaches Outside Midline (Standing, Dynamic Balance) with RW, L/R weight shift  -AN      Recorded by [AN] Stacey Garcia, OT 08/07/18 1402      Row Name 08/07/18 1540             Standing Balance Activity    Activities Performed (Standing, Balance Training) (P)  semi tandem stance;feet together in stance  -CM      Support Needed for Balance (Standing, Balance Training) (P)  balances without upper extremity support  -CM      Progressive Balance Activity (Standing, Balance Training) (P)  eyes closed during activity  -CM      Comment (Standing, Balance Training) (P)  1 x 30 sec feet together; 2 x 30 sec semi tandem ; min A x2; increased sway   -CM      Recorded by [CM] Mariluz Bean, PT Student 08/07/18 1623      Row Name 08/07/18 1540 08/07/18 1316          Positioning and Restraints    Pre-Treatment Position (P)  sitting in chair/recliner  -CM sitting in chair/recliner  -AN     Post Treatment Position (P)  chair  -CM chair  -AN     In Chair (P)  reclined;call light within reach;notified nsg;with family/caregiver;waffle cushion;legs elevated  -CM reclined;call light within reach;encouraged to call for assist;with family/caregiver  -AN     Recorded by [CM] Mariluz Bean, PT Student 08/07/18 1623 [AN] Stacey Garcia, OT 08/07/18 1402     Row Name 08/07/18 1540             Pain Assessment    Additional Documentation (P)  Pain Scale: Numbers Pre/Post-Treatment (Group)  -CM      Recorded by [CM] Mariluz Bean, PT Student 08/07/18 1623      Row Name 08/07/18 1540 08/07/18 1316          Pain Scale: Numbers Pre/Post-Treatment    Pain Scale: Numbers, Pretreatment (P)  0/10 - no pain  -CM 0/10 - no pain  -AN     Pain Scale: Numbers, Post-Treatment (P)  0/10 - no pain  -CM 0/10 - no pain  -AN     Recorded by [CM] Mariluz Bean, PT Student 08/07/18 1623 [AN] Stacey Garcia, OT 08/07/18 1402     Row Name 08/07/18  1540             Coping    Observed Emotional State (P)  accepting  -CM      Verbalized Emotional State (P)  acceptance  -CM      Recorded by [CM] Mariluz Bean, PT Student 08/07/18 1623      Row Name 08/07/18 1540             Plan of Care Review    Plan of Care Reviewed With (P)  patient  -CM      Recorded by [CM] Mariluz Bean, PT Student 08/07/18 1623      Row Name 08/07/18 1316             Outcome Summary/Treatment Plan (OT)    Daily Summary of Progress (OT) progress toward functional goals as expected  -AN      Plan for Continued Treatment (OT) continue POC  -AN      Anticipated Discharge Disposition (OT) home with assist  -AN      Recorded by [AN] Stacey Garcia, OT 08/07/18 1402      Row Name 08/07/18 1540             Outcome Summary/Treatment Plan (PT)    Daily Summary of Progress (PT) (P)  progress toward functional goals is good  -CM      Recorded by [CM] Mariluz Bean, PT Student 08/07/18 1623        User Key  (r) = Recorded By, (t) = Taken By, (c) = Cosigned By    Initials Name Effective Dates Discipline    AN Stacey Garcia, OT 06/22/15 -  OT    CM Mariluz Bean, PT Student 06/07/18 -  PT                     Physical Therapy Education     Title: PT OT SLP Therapies (Active)     Topic: Physical Therapy (Active)     Point: Mobility training (Active)    Learning Progress Summary     Learner Status Readiness Method Response Comment Documented by    Patient Active Acceptance E NR  CM 08/07/18 1540     Active Acceptance E NR  KR 08/06/18 1557     Active Acceptance E NR  VENITA 08/05/18 1335          Point: Home exercise program (Active)    Learning Progress Summary     Learner Status Readiness Method Response Comment Documented by    Patient Active Acceptance E NR  CM 08/07/18 1540     Active Acceptance E NR  KR 08/06/18 1557     Active Acceptance E NR  VENITA 08/05/18 1335          Point: Body mechanics (Active)    Learning Progress Summary     Learner Status Readiness Method Response Comment  Documented by    Patient Active Acceptance E NR  CM 08/07/18 1540     Active Acceptance E NR  KR 08/06/18 1557     Active Acceptance E NR  VENITA 08/05/18 1335          Point: Precautions (Active)    Learning Progress Summary     Learner Status Readiness Method Response Comment Documented by    Patient Active Acceptance E NR  CM 08/07/18 1540     Active Acceptance E NR  KR 08/06/18 1557     Active Acceptance E NR  VENITA 08/05/18 1335                      User Key     Initials Effective Dates Name Provider Type Discipline    VENITA 06/19/15 -  Sharona Rondon, PT Physical Therapist PT    KR 04/03/18 -  Dana Zhu, PT Physical Therapist PT    CM 06/07/18 -  Mariluz Bean, PT Student PT Student PT                    PT Recommendation and Plan     Outcome Summary/Treatment Plan (PT)  Daily Summary of Progress (PT): (P) progress toward functional goals is good  Plan of Care Reviewed With: (P) patient  Progress: (P) declining  Outcome Summary: (P) Pt ambulated 160 ft, min A x2 using FWW. Pt demonstrated increased difficulty maintaining adequate balance to ambulate w/o use of AD. Pt limited from decreased proprioception in RLE, fatigue and decreased awareness of deficits. Pt would benefit from cont'd skilled PT services.           Outcome Measures     Row Name 08/07/18 1540 08/07/18 1316 08/06/18 1332       How much help from another person do you currently need...    Turning from your back to your side while in flat bed without using bedrails? (P)  3  -CM  -- 4  -KR    Moving from lying on back to sitting on the side of a flat bed without bedrails? (P)  3  -CM  -- 3  -KR    Moving to and from a bed to a chair (including a wheelchair)? (P)  3  -CM  -- 3  -KR    Standing up from a chair using your arms (e.g., wheelchair, bedside chair)? (P)  3  -CM  -- 3  -KR    Climbing 3-5 steps with a railing? (P)  2  -CM  -- 2  -KR    To walk in hospital room? (P)  3  -CM  -- 2  -KR    AM-PAC 6 Clicks Score (P)  17  -CM  -- 17  -KR        How much help from another is currently needed...    Putting on and taking off regular lower body clothing?  -- 2  -AN  --    Bathing (including washing, rinsing, and drying)  -- 2  -AN  --    Toileting (which includes using toilet bed pan or urinal)  -- 2  -AN  --    Putting on and taking off regular upper body clothing  -- 2  -AN  --    Taking care of personal grooming (such as brushing teeth)  -- 3  -AN  --    Eating meals  -- 2  -AN  --    Score  -- 13  -AN  --       Modified Michael Scale    Modified [place] Scale (P)  4 - Moderately severe disability.  Unable to walk without assistance, and unable to attend to own bodily needs without assistance.  -CM 4 - Moderately severe disability.  Unable to walk without assistance, and unable to attend to own bodily needs without assistance.  -AN  --       Functional Assessment    Outcome Measure Options (P)  -[facility] 6 Clicks Basic Mobility (PT)  -CM  -- [facility] 6 Clicks Basic Mobility (PT)  -KR    Row Name 08/05/18 1335 08/05/18 0818          How much help from another person do you currently need...    Turning from your back to your side while in flat bed without using bedrails? 4  -VENITA  --     Moving from lying on back to sitting on the side of a flat bed without bedrails? 3  -VENITA  --     Moving to and from a bed to a chair (including a wheelchair)? 3  -VENITA  --     Standing up from a chair using your arms (e.g., wheelchair, bedside chair)? 3  -VENITA  --     Climbing 3-5 steps with a railing? 2  -VENITA  --     To walk in hospital room? 2  -VENITA  --     AM-PAC 6 Clicks Score 17  -VENITA  --        How much help from another is currently needed...    Putting on and taking off regular lower body clothing?  -- 2  -JR     Bathing (including washing, rinsing, and drying)  -- 2  -JR     Toileting (which includes using toilet bed pan or urinal)  -- 2  -JR     Putting on and taking off regular upper body clothing  -- 2  -JR     Taking care of personal grooming (such as brushing teeth)  -- 2   -JR     Eating meals  -- 1  -JR     Score  -- 11  -JR        Modified [place] Scale    Modified Michael Scale 4 - Moderately severe disability.  Unable to walk without assistance, and unable to attend to own bodily needs without assistance.  -VENITA 4 - Moderately severe disability.  Unable to walk without assistance, and unable to attend to own bodily needs without assistance.  -JR        Functional Assessment    Outcome Measure Options AM-PAC 6 Clicks Basic Mobility (PT)  -VENITA AM-PAC 6 Clicks Daily Activity (OT);Modified Michael  -JR       User Key  (r) = Recorded By, (t) = Taken By, (c) = Cosigned By    Initials Name Provider Type    VENITA Sharona Rondon, PT Physical Therapist    Stacey Unger, OT Occupational Therapist    Lita Granados, OT Occupational Therapist    Dana Moreno, PT Physical Therapist    CM Mariluz Bean, PT Student PT Student           Time Calculation:         PT Charges     Row Name 08/07/18 1626 08/07/18 1540          Time Calculation    Start Time (P)  1450  -CM  --     PT Received On (P)  08/07/18  -CM  --     PT Goal Re-Cert Due Date (P)  08/15/18  -CM  --        Time Calculation- PT    Total Timed Code Minutes- PT (P)  25 minute(s)  -CM  --        Timed Charges    92909 - PT Therapeutic Exercise Minutes  -- (P)  10  -CM     46210 - Gait Training Minutes   -- (P)  15  -CM       User Key  (r) = Recorded By, (t) = Taken By, (c) = Cosigned By    Initials Name Provider Type    CM Mariluz Bean, PT Student PT Student        Therapy Suggested Charges     Code   Minutes Charges    84596 (CPT®) Hc Pt Neuromusc Re Education Ea 15 Min      96501 (CPT®) Hc Pt Ther Proc Ea 15 Min 10 1    31780 (CPT®) Hc Gait Training Ea 15 Min 15 1    40657 (CPT®) Hc Pt Therapeutic Act Ea 15 Min      28677 (CPT®) Hc Pt Manual Therapy Ea 15 Min      50273 (CPT®) Hc Pt Iontophoresis Ea 15 Min      56816 (CPT®) Hc Pt Elec Stim Ea-Per 15 Min      67833 (CPT®) Hc Pt Ultrasound Ea 15 Min      81933 (CPT®)  Hc Pt Self Care/Mgmt/Train Ea 15 Min      Total  25 2        Therapy Charges for Today     Code Description Service Date Service Provider Modifiers Qty    52136894437 HC PT THER PROC EA 15 MIN 8/7/2018 Mariluz Bean, PT Student GP 1    66395540848 HC GAIT TRAINING EA 15 MIN 8/7/2018 Mariluz Bean, PT Student GP 1    00477866337 HC PT THER SUPP EA 15 MIN 8/7/2018 aMriluz Bean, PT Student GP 1          PT G-Codes  Outcome Measure Options: (P) AM-PAC 6 Clicks Basic Mobility (PT)    Mariluz Bean, PT Student  8/7/2018

## 2018-08-07 NOTE — PROGRESS NOTES
"Intensive Care Follow-up     Hospital:  LOS: 3 days   Mr. Valente Arndt Jr., 48 y.o. male is followed for:   ICH (intracerebral hemorrhage) (CMS/HCC)        Subjective   Interval History:  The chart is been reviewed. The patient has remained afebrile. He has required some when necessary labetalol for blood pressures greater than 1 60 mmHg. He states that he has not had any problems with visual changes or headache. He has had some weakness of his right upper extremity which is stable.    The patient's relevant past medical, surgical and social history were reviewed and updated in Epic as appropriate.        Objective     Infusions:    sodium chloride 75 mL/hr Last Rate: 75 mL/hr (08/06/18 0200)     Medications:    hydrALAZINE 50 mg Oral Q8H   lisinopril 20 mg Oral Q12H       Vital Sign Min/Max for last 24 hours  Temp  Min: 98.2 °F (36.8 °C)  Max: 98.6 °F (37 °C)   BP  Min: 123/74  Max: 198/75   Pulse  Min: 56  Max: 92   Resp  Min: 18  Max: 22   SpO2  Min: 92 %  Max: 98 %   No Data Recorded       Input/Output for last 24 hour shift  08/06 0701 - 08/07 0700  In: -   Out: 2675 [Urine:2675]      Objective:  General Appearance:  Comfortable, well-appearing, in no acute distress and not in pain.    Vital signs: (most recent): Blood pressure 152/92, pulse 65, temperature 98.6 °F (37 °C), temperature source Oral, resp. rate 18, height 167.6 cm (66\"), weight 136 kg (300 lb), SpO2 95 %.  No fever.    Output: Producing urine.    HEENT: Normal HEENT exam.    Lungs:  Normal effort and normal respiratory rate.  Breath sounds clear to auscultation.  He is not in respiratory distress.  No stridor.  No rales, decreased breath sounds, wheezes or rhonchi.    Heart: Normal rate.  Regular rhythm.  S1 normal and S2 normal.  No murmur or friction rub.   Chest: Symmetric chest wall expansion.   Abdomen: Abdomen is soft and non-distended.  Bowel sounds are normal.   There is no abdominal tenderness.     Extremities: (Decreased 3-4 out of 5 "  strength on the right.)  Pulses: Distal pulses are intact.    Neurological: Patient is alert and oriented to person, place and time.    Pupils:  Pupils are equal, round, and reactive to light.    Skin:  Warm and dry.  No rash or cyanosis.               Results from last 7 days  Lab Units 08/07/18  0426 08/06/18  0453 08/04/18  2221   WBC 10*3/mm3 13.17* 12.02* 12.02*   HEMOGLOBIN g/dL 14.2 13.1 14.7   PLATELETS 10*3/mm3 277 276 291       Results from last 7 days  Lab Units 08/07/18  0426 08/06/18  1753 08/06/18  0453 08/04/18  2221   SODIUM mmol/L 138  --  143 142   POTASSIUM mmol/L 4.0 4.2 3.3* 3.7   CO2 mmol/L 24.0  --  26.0 26.0   BUN mg/dL 15  --  9 20   CREATININE mg/dL 0.95  --  0.68 1.17   MAGNESIUM mg/dL  --   --  2.1 2.2   PHOSPHORUS mg/dL  --   --  2.2* 1.8*   GLUCOSE mg/dL 98  --  108* 112*     Estimated Creatinine Clearance: 124.7 mL/min (by C-G formula based on SCr of 0.95 mg/dL).            I reviewed the patient's results and images.     Assessment/Plan   Impression      Principal Problem:    ICH (intracerebral hemorrhage) (CMS/[facility])  Active Problems:    Essential hypertension    H/O noncompliance with medical treatment, presenting hazards to health    Class 3 obesity       Plan        Increase hydralazine today. Continue with physical therapy and occupational therapy.  Referral to rehabilitation has been made.  He will transition to the floor today.  Follow-up orders and labs have been placed.    Plan of care and goals reviewed with mulitdisciplinary team at daily rounds.   I discussed the patient's findings and my recommendations with patient, family and nursing staff         Papo Melgar MD, [facility]  Pulmonary and Critical Care Medicine  08/07/18 11:28 AM

## 2018-08-07 NOTE — PLAN OF CARE
Problem: Patient Care Overview  Goal: Plan of Care Review  Outcome: Ongoing (interventions implemented as appropriate)   08/07/18 3737   Coping/Psychosocial   Plan of Care Reviewed With patient   Plan of Care Review   Progress improving   OTHER   Outcome Summary Pt demonstrated increased I with functional balance and ADL I. Pt CGA for standing and mod assist LB ADL's. Continue OT until txfr to IRF.

## 2018-08-08 PROBLEM — K59.09 OTHER CONSTIPATION: Status: ACTIVE | Noted: 2018-08-08

## 2018-08-08 LAB
ANION GAP SERPL CALCULATED.3IONS-SCNC: 9 MMOL/L (ref 3–11)
BASOPHILS # BLD AUTO: 0.08 10*3/MM3 (ref 0–0.2)
BASOPHILS NFR BLD AUTO: 0.5 % (ref 0–1)
BUN BLD-MCNC: 17 MG/DL (ref 9–23)
BUN/CREAT SERPL: 17.5 (ref 7–25)
CALCIUM SPEC-SCNC: 9.7 MG/DL (ref 8.7–10.4)
CHLORIDE SERPL-SCNC: 105 MMOL/L (ref 99–109)
CO2 SERPL-SCNC: 25 MMOL/L (ref 20–31)
CREAT BLD-MCNC: 0.97 MG/DL (ref 0.6–1.3)
DEPRECATED RDW RBC AUTO: 50 FL (ref 37–54)
EOSINOPHIL # BLD AUTO: 0.23 10*3/MM3 (ref 0–0.3)
EOSINOPHIL NFR BLD AUTO: 1.5 % (ref 0–3)
ERYTHROCYTE [DISTWIDTH] IN BLOOD BY AUTOMATED COUNT: 15.4 % (ref 11.3–14.5)
GFR SERPL CREATININE-BSD FRML MDRD: 83 ML/MIN/1.73
GLUCOSE BLD-MCNC: 101 MG/DL (ref 70–100)
HCT VFR BLD AUTO: 42.6 % (ref 38.9–50.9)
HGB BLD-MCNC: 13.7 G/DL (ref 13.1–17.5)
IMM GRANULOCYTES # BLD: 0.04 10*3/MM3 (ref 0–0.03)
IMM GRANULOCYTES NFR BLD: 0.3 % (ref 0–0.6)
LYMPHOCYTES # BLD AUTO: 2.09 10*3/MM3 (ref 0.6–4.8)
LYMPHOCYTES NFR BLD AUTO: 14 % (ref 24–44)
MCH RBC QN AUTO: 28.8 PG (ref 27–31)
MCHC RBC AUTO-ENTMCNC: 32.2 G/DL (ref 32–36)
MCV RBC AUTO: 89.5 FL (ref 80–99)
MONOCYTES # BLD AUTO: 1.23 10*3/MM3 (ref 0–1)
MONOCYTES NFR BLD AUTO: 8.3 % (ref 0–12)
NEUTROPHILS # BLD AUTO: 11.25 10*3/MM3 (ref 1.5–8.3)
NEUTROPHILS NFR BLD AUTO: 75.7 % (ref 41–71)
PLATELET # BLD AUTO: 289 10*3/MM3 (ref 150–450)
PMV BLD AUTO: 11.7 FL (ref 6–12)
POTASSIUM BLD-SCNC: 3.9 MMOL/L (ref 3.5–5.5)
RBC # BLD AUTO: 4.76 10*6/MM3 (ref 4.2–5.76)
SODIUM BLD-SCNC: 139 MMOL/L (ref 132–146)
WBC NRBC COR # BLD: 14.88 10*3/MM3 (ref 3.5–10.8)

## 2018-08-08 PROCEDURE — 97110 THERAPEUTIC EXERCISES: CPT

## 2018-08-08 PROCEDURE — 80048 BASIC METABOLIC PNL TOTAL CA: CPT | Performed by: INTERNAL MEDICINE

## 2018-08-08 PROCEDURE — 97530 THERAPEUTIC ACTIVITIES: CPT

## 2018-08-08 PROCEDURE — 99233 SBSQ HOSP IP/OBS HIGH 50: CPT | Performed by: INTERNAL MEDICINE

## 2018-08-08 PROCEDURE — 85025 COMPLETE CBC W/AUTO DIFF WBC: CPT | Performed by: INTERNAL MEDICINE

## 2018-08-08 RX ORDER — LISINOPRIL 10 MG/1
30 TABLET ORAL EVERY 12 HOURS SCHEDULED
Status: DISCONTINUED | OUTPATIENT
Start: 2018-08-08 | End: 2018-08-09 | Stop reason: HOSPADM

## 2018-08-08 RX ORDER — SENNA AND DOCUSATE SODIUM 50; 8.6 MG/1; MG/1
2 TABLET, FILM COATED ORAL 2 TIMES DAILY
Status: DISCONTINUED | OUTPATIENT
Start: 2018-08-08 | End: 2018-08-09 | Stop reason: HOSPADM

## 2018-08-08 RX ORDER — LISINOPRIL 10 MG/1
10 TABLET ORAL ONCE
Status: COMPLETED | OUTPATIENT
Start: 2018-08-08 | End: 2018-08-08

## 2018-08-08 RX ADMIN — LISINOPRIL 20 MG: 20 TABLET ORAL at 08:47

## 2018-08-08 RX ADMIN — HYDRALAZINE HYDROCHLORIDE 50 MG: 50 TABLET, FILM COATED ORAL at 20:20

## 2018-08-08 RX ADMIN — POLYETHYLENE GLYCOL 3350 17 G: 17 POWDER, FOR SOLUTION ORAL at 10:58

## 2018-08-08 RX ADMIN — HYDRALAZINE HYDROCHLORIDE 50 MG: 50 TABLET, FILM COATED ORAL at 05:54

## 2018-08-08 RX ADMIN — HYDRALAZINE HYDROCHLORIDE 50 MG: 50 TABLET, FILM COATED ORAL at 14:32

## 2018-08-08 RX ADMIN — LABETALOL HYDROCHLORIDE 10 MG: 5 INJECTION INTRAVENOUS at 04:12

## 2018-08-08 RX ADMIN — LISINOPRIL 30 MG: 10 TABLET ORAL at 20:20

## 2018-08-08 RX ADMIN — Medication 2 TABLET: at 20:20

## 2018-08-08 RX ADMIN — LISINOPRIL 10 MG: 10 TABLET ORAL at 10:58

## 2018-08-08 RX ADMIN — Medication 2 TABLET: at 10:58

## 2018-08-08 NOTE — THERAPY TREATMENT NOTE
Acute Care - Occupational Therapy Treatment Note  [facility]     Patient Name: Valente Arndt Jr.  : 1970  MRN: [id number]  Today's Date: 2018  Onset of Illness/Injury or Date of Surgery: 18  Date of Referral to OT: 18  Referring Physician: MD Hughes    Admit Date: 2018       ICD-10-CM ICD-9-CM   1. Nontraumatic hemorrhage of left cerebral hemisphere (CMS/HCC) I61.2 431   2. Severe uncontrolled hypertension I10 401.9   3. Weakness R53.1 780.79   4. Dysarthria R47.1 784.51   5. Impaired mobility and ADLs Z74.09 799.89   6. Impaired functional mobility, balance, gait, and endurance Z74.09 V49.89     Patient Active Problem List   Diagnosis   • ICH (intracerebral hemorrhage) (CMS/HCC)   • Essential hypertension   • H/O noncompliance with medical treatment, presenting hazards to health   • Class 3 obesity   • Other constipation     History reviewed. No pertinent past medical history.  Past Surgical History:   Procedure Laterality Date   • APPENDECTOMY     • FOOT SURGERY         Therapy Treatment          Rehabilitation Treatment Summary     Row Name 18 1410             Treatment Time/Intention    Discipline occupational therapist  -AN      Document Type therapy note (daily note)  -AN      Subjective Information no complaints  -AN      Mode of Treatment occupational therapy  -AN      Patient/Family Observations Mother and daughter present  -AN      Care Plan Review care plan/treatment goals reviewed;risks/benefits reviewed  -AN      Patient Effort good  -AN      Comment Pt up alone in bathroom;pt with increased motor coordn and balance this date  -AN      Existing Precautions/Restrictions fall  -AN      Treatment Considerations/Comments R side weakness  -AN      Equipment Issued to Patient --   theraputty and theraband  -AN      Recorded by [AN] Stacey Garcia OT 18 1457      Row Name 18 1410             Cognitive Assessment/Intervention- PT/OT    Affect/Mental Status  (Cognitive) WFL  -AN      Orientation Status (Cognition) oriented x 4  -AN      Follows Commands (Cognition) WFL  -AN      Cognitive Function (Cognitive) safety deficit  -AN      Safety Deficit (Cognitive) at risk behavior observed  -AN      Personal Safety Interventions fall prevention program maintained  -AN      Recorded by [AN] Stacey Garcia, OT 08/08/18 1457      Row Name 08/08/18 1410             Safety Issues, Functional Mobility    Safety Issues Affecting Function (Mobility) judgment;at risk behavior observed  -AN      Impairments Affecting Function (Mobility) balance;coordination;strength  -AN      Comment, Safety Issues/Impairments (Mobility) pt up in bathroom without assist  -AN      Recorded by [AN] Stacey Garcia, OT 08/08/18 1457      Row Name 08/08/18 1410             Bed Mobility Assessment/Treatment    Comment (Bed Mobility) pt OOOB  -AN      Recorded by [AN] Stacey Garcia, OT 08/08/18 1457      Row Name 08/08/18 1410             Functional Mobility    Functional Mobility- Ind. Level supervision required  -AN      Functional Mobility- Device rolling walker  -AN      Functional Mobility-Distance (Feet) 10  -AN      Recorded by [AN] Stacey Garcai, OT 08/08/18 1457      Row Name 08/08/18 1410             Stand-Sit Transfer    Stand-Sit Grand (Transfers) supervision;verbal cues  -AN      Assistive Device (Stand-Sit Transfers) walker, front-wheeled  -AN      Recorded by [AN] Stacey Garcia, OT 08/08/18 1457      Row Name 08/08/18 1410             Grooming Assessment/Training    Grand Level (Grooming) wash face, hands;supervision;oral care regimen  -AN      Grooming Position sink side;unsupported standing  -AN      Recorded by [AN] Stacey Garcia, OT 08/08/18 1457      Row Name 08/08/18 1410             Motor Skills Assessment/Interventions    Additional Documentation Hand  Strength Testing (Group);Motor Coordination Assessment/Training (Group)  -AN      Recorded by [AN] Stacey Garcia  AYDIN OT 08/08/18 1457      Row Name 08/08/18 1410             Therapeutic Exercise    67153 - OT Therapeutic Exercise Minutes 13  -AN      60299 - OT Therapeutic Activity Minutes 10  -AN      Recorded by [AN] Stacey Garcia, OT 08/08/18 1457      Row Name 08/08/18 1410             Therapeutic Exercise    Upper Extremity Range of Motion (Therapeutic Exercise) shoulder flexion/extension, bilateral;shoulder horizontal abduction/adduction, bilateral;shoulder internal/external rotation, bilateral;elbow flexion/extension, bilateral;forearm supination/pronation, bilateral;wrist flexion/extension, bilateral  -AN      Weight/Resistance (Therapeutic Exercise) yellow  -AN      Position (Therapeutic Exercise) seated  -AN      Sets/Reps (Therapeutic Exercise) 2 sets of 10 per ex type  -AN      Recorded by [AN] Stacey Garcia, OT 08/08/18 1457      Row Name 08/08/18 1410             Static Standing Balance    Level of [place] (Supported Standing, Static Balance) supervision  -AN      Time Able to Maintain Position (Supported Standing, Static Balance) 2 to 3 minutes  -AN      Assistive Device Utilized (Supported Standing, Static Balance) rolling walker  -AN      Recorded by [AN] Stacey Garcia, OT 08/08/18 1457      Row Name 08/08/18 1410             Dynamic Standing Balance    Level of [place], Reaches Outside Midline (Standing, Dynamic Balance) supervision  -AN      Time Able to Maintain Position, Reaches Outside Midline (Standing, Dynamic Balance) 30 to 45 seconds  -AN      Comment, Reaches Outside Midline (Standing, Dynamic Balance) weight shift and funcitonal reach during grooming tasks  -AN      Recorded by [AN] Stacey Garcia OT 08/08/18 1457      Row Name 08/08/18 1410             Motor Coordination Assessment/Training    Comment, Fine Motor Coordination Training Functional tasks with screws, ADL's; tasks of grasp and release of small objects, medium semi-flat objects; theraputty, foam blocks and release/placement  and localization of objects  -AN      Recorded by [AN] Stacey Garcia, OT 08/08/18 1457      Row Name 08/08/18 1410             Positioning and Restraints    Pre-Treatment Position standing in room  -AN      Post Treatment Position chair  -AN      In Chair reclined;call light within reach;encouraged to call for assist;with family/caregiver  -AN      Recorded by [AN] Stacey Garcia, OT 08/08/18 1457      Row Name 08/08/18 1410             Pain Scale: Numbers Pre/Post-Treatment    Pain Scale: Numbers, Pretreatment 0/10 - no pain  -AN      Pain Scale: Numbers, Post-Treatment 0/10 - no pain  -AN      Recorded by [AN] Stacey Garcia, OT 08/08/18 1457      Row Name 08/08/18 1410             Plan of Care Review    Plan of Care Reviewed With patient;family  -AN      Recorded by [AN] Stacey Garcia, OT 08/08/18 1457      Row Name 08/08/18 1410             Outcome Summary/Treatment Plan (OT)    Daily Summary of Progress (OT) progress toward functional goals is good  -AN      Plan for Continued Treatment (OT) continue POC  -AN      Anticipated Discharge Disposition (OT) inpatient rehabilitation facility  -AN      Recorded by [AN] Stacey Garcia, OT 08/08/18 1457        User Key  (r) = Recorded By, (t) = Taken By, (c) = Cosigned By    Initials Name Effective Dates Discipline    AN Stacey Garcia, OT 06/22/15 -  OT               Occupational Therapy Education     Title: PT OT SLP Therapies (Active)     Topic: Occupational Therapy (Active)     Point: ADL training (Done)     Description: Instruct learner(s) on proper safety adaptation and remediation techniques during self care or transfers.   Instruct in proper use of assistive devices.   Learning Progress Summary     Learner Status Readiness Method Response Comment Documented by    Patient Done Acceptance EZEQUIEL MAC DU, NR Educated on benefits of all motor coordination and strengthening tasks today. Discussed need for assist while up. AN 08/08/18 1457     Done Acceptance EZEQUIEL MAC DU, NR  ADL retraining and incrased use of R UE  08/07/18 1404     Done Acceptance E DAWSON Educated pt regarding role of therapy, limb protection, safety precautions, transfer techniques and encouraged use of call kaplan.  08/05/18 0904          Point: Home exercise program (Done)     Description: Instruct learner(s) on appropriate technique for monitoring, assisting and/or progressing therapeutic exercises/activities.   Learning Progress Summary     Learner Status Readiness Method Response Comment Documented by    Patient Done Acceptance E,D DAWSON,REVA,NR Educated on benefits of all motor coordination and strengthening tasks today. Discussed need for assist while up.  08/08/18 7595                      User Key     Initials Effective Dates Name Provider Type Discipline     06/22/15 -  Stacey Garcia, OT Occupational Therapist OT     06/22/15 -  Lita Kaminski, OT Occupational Therapist OT                OT Recommendation and Plan  Outcome Summary/Treatment Plan (OT)  Daily Summary of Progress (OT): progress toward functional goals is good  Plan for Continued Treatment (OT): continue POC  Anticipated Discharge Disposition (OT): inpatient rehabilitation facility  Daily Summary of Progress (OT): progress toward functional goals is good  Plan of Care Review  Plan of Care Reviewed With: patient  Plan of Care Reviewed With: patient  Outcome Summary: Pt with significant increase in functional balance. Pt with multiple FM/GM tasks and exercises this date with display of attention and no loss of patience.         Outcome Measures     Row Name 08/08/18 1410 08/07/18 1540 08/07/18 1316       How much help from another person do you currently need...    Turning from your back to your side while in flat bed without using bedrails?  -- 3  -KR (r) CM (t) KR (c)  --    Moving from lying on back to sitting on the side of a flat bed without bedrails?  -- 3  -KR (r) CM (t) KR (c)  --    Moving to and from a bed to a chair (including a  wheelchair)?  -- 3  -KR (r) CM (t) KR (c)  --    Standing up from a chair using your arms (e.g., wheelchair, bedside chair)?  -- 3  -KR (r) CM (t) KR (c)  --    Climbing 3-5 steps with a railing?  -- 2  -KR (r) CM (t) KR (c)  --    To walk in hospital room?  -- 3  -KR (r) CM (t) KR (c)  --    [facility] 6 Clicks Score  -- 17  -KR (r) CM (t)  --       How much help from another is currently needed...    Putting on and taking off regular lower body clothing? 2  -AN  -- 2  -AN    Bathing (including washing, rinsing, and drying) 2  -AN  -- 2  -AN    Toileting (which includes using toilet bed pan or urinal) 2  -AN  -- 2  -AN    Putting on and taking off regular upper body clothing 2  -AN  -- 2  -AN    Taking care of personal grooming (such as brushing teeth) 3  -AN  -- 3  -AN    Eating meals 3  -AN  -- 2  -AN    Score 14  -AN  -- 13  -AN       Modified [place] Scale    Modified Michael Scale 3 - Moderate disability.  Requiring some help, but able to walk without assistance.  -AN 4 - Moderately severe disability.  Unable to walk without assistance, and unable to attend to own bodily needs without assistance.  -KR (r) CM (t) KR (c) 4 - Moderately severe disability.  Unable to walk without assistance, and unable to attend to own bodily needs without assistance.  -AN       Functional Assessment    Outcome Measure Options  -- [facility] 6 Clicks Basic Mobility (PT)  -KR (r) CM (t) KR (c)  --    Row Name 08/06/18 6551             How much help from another person do you currently need...    Turning from your back to your side while in flat bed without using bedrails? 4  -KR      Moving from lying on back to sitting on the side of a flat bed without bedrails? 3  -KR      Moving to and from a bed to a chair (including a wheelchair)? 3  -KR      Standing up from a chair using your arms (e.g., wheelchair, bedside chair)? 3  -KR      Climbing 3-5 steps with a railing? 2  -KR      To walk in hospital room? 2  -KR      AM-PAC 6 Clicks Score 17   -KR         Functional Assessment    Outcome Measure Options AM-PAC 6 Clicks Basic Mobility (PT)  -KR        User Key  (r) = Recorded By, (t) = Taken By, (c) = Cosigned By    Initials Name Provider Type    Stacey Unger, OT Occupational Therapist    Dana Moreno, PT Physical Therapist    CM Mariluz Bean, PT Student PT Student           Time Calculation:         Time Calculation- OT     Row Name 08/08/18 1459 08/08/18 1410          Time Calculation- OT    OT Start Time 1410  -AN  --     Total Timed Code Minutes- OT 38 minute(s)  -AN  --     OT Received On 08/08/18  -AN  --     OT Goal Re-Cert Due Date 08/15/18  -AN  --        Timed Charges    56514 - OT Therapeutic Exercise Minutes  -- 13  -AN     17640 - OT Therapeutic Activity Minutes  -- 10  -AN       User Key  (r) = Recorded By, (t) = Taken By, (c) = Cosigned By    Initials Name Provider Type    Stacey Unger OT Occupational Therapist           Therapy Suggested Charges     Code   Minutes Charges    04037 (CPT®) Hc Ot Neuromusc Re Education Ea 15 Min      97106 (CPT®) Hc Ot Ther Proc Ea 15 Min 13 1    80898 (CPT®) Hc Ot Therapeutic Act Ea 15 Min 10 1    78160 (CPT®) Hc Ot Manual Therapy Ea 15 Min      06277 (CPT®) Hc Ot Iontophoresis Ea 15 Min      03198 (CPT®) Hc Ot Elec Stim Ea-Per 15 Min      81436 (CPT®) Hc Ot Ultrasound Ea 15 Min      12263 (CPT®) Hc Ot Self Care/Mgmt/Train Ea 15 Min      Total  23 2        Therapy Charges for Today     Code Description Service Date Service Provider Modifiers Qty    08551222347 HC OT THER PROC EA 15 MIN 8/7/2018 Stacey Garcia, OT GO 1    18288180379 HC OT THERAPEUTIC ACT EA 15 MIN 8/7/2018 Stacey Garcia OT GO 1    97194148460 HC OT THER PROC EA 15 MIN 8/8/2018 Stacey Garcia OT GO 2    46605826985 HC OT THERAPEUTIC ACT EA 15 MIN 8/8/2018 Stacey Garcia, OT GO 1               Stacey PERRIN. Jose OT  8/8/2018

## 2018-08-08 NOTE — PLAN OF CARE
Problem: Patient Care Overview  Goal: Plan of Care Review  Outcome: Ongoing (interventions implemented as appropriate)   08/08/18 2788   Coping/Psychosocial   Plan of Care Reviewed With patient   Plan of Care Review   Progress improving   OTHER   Outcome Summary Patient continues to show improvement with ROM with Right arm and leg, ADLS and mobility. Pt becomes frustrated, but continues to try activities. Plan is to possibly discharge tomorrow if bed available at [facility].

## 2018-08-08 NOTE — PROGRESS NOTES
Continued Stay Note  [facility]     Patient Name: Valente Arndt Jr.  MRN: [id number]  Today's Date: 8/8/2018    Admit Date: 8/4/2018    Consent obtained for the participation in the [facility] Transitions Program. Taylor Mccullough RN        Discharge Plan     Row Name 08/08/18 1035       Plan    Plan [facility]    Patient/Family in Agreement with Plan yes    Plan Comments Per [facility], patient has bed on the stroke unit at [facility] tomorrow, 8/9.  Family to transport.  Discussed with patient and nursing.  CM will continue to follow.  Mavis Tyson RN [phone]    Final Discharge Disposition Code 62 - inpatient rehab facility              Discharge Codes    No documentation.       Expected Discharge Date and Time     Expected Discharge Date Expected Discharge Time    Aug 9, 2018             Taylor Mccullough RN

## 2018-08-08 NOTE — PAYOR COMM NOTE
"Benny Yen  (48 y.o. Male)     Date of Birth Social Security Number Address Home Phone MRN    1970  [address]  [facility] [address] [phone] [id number]    Jain Marital Status          None Single       Admission Date Admission Type Admitting Provider Attending Provider Department, Room/Bed    8/4/18 Emergency Bridgett Foster MD Hunter, Sarah M, MD [facility] 3F, S312/1    Discharge Date Discharge Disposition Discharge Destination                       Attending Provider:  Bridgett Foster MD    Allergies:  No Known Allergies    Isolation:  None   Infection:  None   Code Status:  CPR    Ht:  167.6 cm (66\")   Wt:  136 kg (300 lb)    Admission Cmt:  None   Principal Problem:  ICH (intracerebral hemorrhage) (CMS/[facility]) [I61.9]                 Active Insurance as of 8/4/2018     Primary Coverage     Payor Plan Insurance Group Employer/Plan Group    [employer] PPO [id number]     Payor Plan Address Payor Plan Phone Number Effective From Effective To    [address] [phone] 1/1/2014     [facility] [id number]       Subscriber Name Subscriber Birth Date Member ID       BENNY YEN JR. 1970 [id number]                 Emergency Contacts      (Rel.) Home Phone Work Phone Mobile Phone    Benny Yen Sr (Father) [phone] -- [phone]                  ICU Vital Signs     Row Name 08/08/18 0847 08/08/18 0726 08/08/18 0600 08/08/18 0554 08/08/18 0412       Vitals    Temp  -- 97.9 °F (36.6 °C)  --  --  --    Temp src  -- Oral  --  --  --    Pulse 72 56 (P)  64 102 64    Heart Rate Source  -- Monitor  --  --  --    Resp  -- 18  --  --  --    Resp Rate Source  -- Visual  --  --  --    BP  -- 147/95 (P)  157/100  -- (!)  167/101    Noninvasive MAP (mmHg)  -- 112 (P)  116  --  --    BP Location  -- Left arm (P)  Left arm  --  --    BP Method  -- Automatic (P)  Automatic  --  --    Patient Position  -- Lying (P)  Lying "  --  --       Oxygen Therapy    SpO2  -- 95 % (P)  93 %  --  --    Pulse Oximetry Type  -- Continuous  --  --  --    Device (Oxygen Therapy)  -- room air  --  --  --    Row Name 08/08/18 0346 08/07/18 2318 08/07/18 2217 08/07/18 2123 08/07/18 1946       Vitals    Temp 98.3 °F (36.8 °C)  --  --  -- 98.8 °F (37.1 °C)    Temp src Oral  --  --  -- Oral    Pulse 63 82 62 79 69    Heart Rate Source Monitor Monitor  --  -- Monitor    Resp 16 16  --  -- 20    Resp Rate Source Visual Visual  --  -- Visual    BP (!)  167/101 166/91 159/100  -- 155/91    Noninvasive MAP (mmHg) 125 119  --  -- 113    BP Location Left arm Right arm  --  -- Left arm    BP Method Automatic Automatic  --  -- Automatic    Patient Position Lying Lying  --  -- Sitting       Oxygen Therapy    SpO2 94 % 95 %  --  -- 95 %    Pulse Oximetry Type  --  --  --  -- Continuous    Row Name 08/07/18 1800 08/07/18 1657 08/07/18 1655 08/07/18 1651 08/07/18 1602       Vitals    Temp  --  --  --  -- 98.7 °F (37.1 °C)    Temp src  --  --  --  -- Oral    Pulse  -- 63 66 70 80    Heart Rate Source  --  --  --  -- Monitor    Resp  --  --  --  -- 18    Resp Rate Source  --  --  --  -- Visual    BP  -- -- 154/96 (!)  171/111   PRN medication given. 166/89    Noninvasive MAP (mmHg)  --  -- 116  -- 104    BP Location  --  --  --  -- Left arm    BP Method  --  --  --  -- Automatic    Patient Position  --  --  --  -- Sitting       Oxygen Therapy    SpO2  --  --  --  -- 97 %    Pulse Oximetry Type  --  --  --  -- Continuous    Device (Oxygen Therapy) room air  --  --  -- room air    Row Name 08/07/18 1600 08/07/18 1400 08/07/18 1255 08/07/18 1230 08/07/18 1200       Vitals    Temp  --  -- 98.5 °F (36.9 °C)  --  --    Temp src  --  -- Oral  --  --    Pulse  --  -- 66 55 67    Heart Rate Source  --  -- Monitor  --  --    Resp  --  -- 18 -- 18    Resp Rate Source  --  -- Visual  -- Visual    BP  --  --  -- (!)  148/101 165/98    Noninvasive MAP (mmHg)  --  --  -- 111 121    BP  "Location  --  -- Left arm  -- Left arm    BP Method  --  -- Automatic  -- Automatic    Patient Position  --  -- Sitting  -- Sitting       Oxygen Therapy    SpO2  --  -- 96 % 93 % 96 %    Pulse Oximetry Type  --  -- Continuous  -- Continuous    Device (Oxygen Therapy) room air room air room air room air room air    Row Name 08/07/18 1130                   Vitals    Pulse 68        /100        Noninvasive MAP (mmHg) 116           Oxygen Therapy    SpO2 96 %            Hospital Medications (active)       Dose Frequency Start End    hydrALAZINE (APRESOLINE) tablet 50 mg 50 mg Every 8 Hours Scheduled 8/7/2018     Sig - Route: Take 1 tablet by mouth Every 8 (Eight) Hours. - Oral    labetalol (NORMODYNE,TRANDATE) injection 10 mg 10 mg Every 4 Hours PRN 8/6/2018     Sig - Route: Infuse 2 mL into a venous catheter Every 4 (Four) Hours As Needed for High Blood Pressure. - Intravenous    lisinopril (PRINIVIL,ZESTRIL) tablet 10 mg 10 mg Once 8/8/2018 8/8/2018    Sig - Route: Take 1 tablet by mouth 1 (One) Time. - Oral    lisinopril (PRINIVIL,ZESTRIL) tablet 30 mg 30 mg Every 12 Hours Scheduled 8/8/2018     Sig - Route: Take 3 tablets by mouth Every 12 (Twelve) Hours. - Oral    polyethylene glycol 3350 powder (packet) 17 g Daily 8/8/2018     Sig - Route: Take 17 g by mouth Daily. - Oral    potassium & sodium phosphates (PHOS-NAK) 280-160-250 MG packet - for Phosphorus 1.25 - 2.1 mg/dL 2 packet Once As Needed 8/6/2018     Sig - Route: Take 2 packets by mouth 1 (One) Time As Needed (Phosphorus 1.25 - 2.1 mg/dL). - Oral    Linked Group 1:  \"Or\" Linked Group Details        potassium & sodium phosphates (PHOS-NAK) 280-160-250 MG packet - for Phosphorus less than 1.25 mg/dL 2 packet Every 6 Hours PRN 8/6/2018     Sig - Route: Take 2 packets by mouth Every 6 (Six) Hours As Needed (Phosphorus less than 1.25 mg/dL). - Oral    Linked Group 1:  \"Or\" Linked Group Details        potassium chloride (MICRO-K) CR capsule 40 mEq 40 mEq As " Needed 8/6/2018     Sig - Route: Take 4 capsules by mouth As Needed (potassium replacement.  see admin instructions). - Oral    sennosides-docusate sodium (SENOKOT-S) 8.6-50 MG tablet 2 tablet 2 tablet 2 Times Daily 8/8/2018     Sig - Route: Take 2 tablets by mouth 2 (Two) Times a Day. - Oral    simethicone (MYLICON) chewable tablet 80 mg 80 mg 4 Times Daily PRN 8/6/2018     Sig - Route: Chew 1 tablet 4 (Four) Times a Day As Needed for Flatulence. - Oral    sodium chloride 0.9 % flush 1-10 mL 1-10 mL As Needed 8/4/2018     Sig - Route: Infuse 1-10 mL into a venous catheter As Needed for Line Care. - Intravenous    sodium chloride 0.9 % flush 10 mL 10 mL As Needed 8/4/2018     Sig - Route: Infuse 10 mL into a venous catheter As Needed for Line Care. - Intravenous    sodium chloride 0.9 % infusion 75 mL/hr Continuous 8/4/2018     Sig - Route: Infuse 75 mL/hr into a venous catheter Continuous. - Intravenous    lisinopril (PRINIVIL,ZESTRIL) tablet 20 mg (Discontinued) 20 mg Every 12 Hours Scheduled 8/6/2018 8/8/2018    Sig - Route: Take 2 tablets by mouth Every 12 (Twelve) Hours. - Oral          Lab Results (last 24 hours)     Procedure Component Value Units Date/Time    Basic Metabolic Panel [[id number]]  (Abnormal) Collected:  08/08/18 0352    Specimen:  Blood Updated:  08/08/18 0557     Glucose 101 (H) mg/dL      BUN 17 mg/dL      Creatinine 0.97 mg/dL      Sodium 139 mmol/L      Potassium 3.9 mmol/L      Chloride 105 mmol/L      CO2 25.0 mmol/L      Calcium 9.7 mg/dL      eGFR Non African Amer 83 mL/min/1.73      BUN/Creatinine Ratio 17.5     Anion Gap 9.0 mmol/L     Narrative:       National [employer] Guidelines    Stage     Description        GFR  1         Normal or High     90+  2         Mild decrease      60-89  3         Moderate decrease  30-59  4         Severe decrease    15-29  5         Kidney failure     <15    CBC & Differential [[id number]] Collected:  08/08/18 0352    Specimen:  Blood Updated:   08/08/18 0411    Narrative:       The following orders were created for panel order CBC & Differential.  Procedure                               Abnormality         Status                     ---------                               -----------         ------                     CBC Auto Differential[[id number]]        Abnormal            Final result                 Please view results for these tests on the individual orders.    CBC Auto Differential [[id number]]  (Abnormal) Collected:  08/08/18 0352    Specimen:  Blood Updated:  08/08/18 0411     WBC 14.88 (H) 10*3/mm3      RBC 4.76 10*6/mm3      Hemoglobin 13.7 g/dL      Hematocrit 42.6 %      MCV 89.5 fL      MCH 28.8 pg      MCHC 32.2 g/dL      RDW 15.4 (H) %      RDW-SD 50.0 fl      MPV 11.7 fL      Platelets 289 10*3/mm3      Neutrophil % 75.7 (H) %      Lymphocyte % 14.0 (L) %      Monocyte % 8.3 %      Eosinophil % 1.5 %      Basophil % 0.5 %      Immature Grans % 0.3 %      Neutrophils, Absolute 11.25 (H) 10*3/mm3      Lymphocytes, Absolute 2.09 10*3/mm3      Monocytes, Absolute 1.23 (H) 10*3/mm3      Eosinophils, Absolute 0.23 10*3/mm3      Basophils, Absolute 0.08 10*3/mm3      Immature Grans, Absolute 0.04 (H) 10*3/mm3         Papo Melgar MD Physician Signed Pulmonology  Progress Notes Date of Service: 8/7/2018 11:28 AM      Expand All Collapse All    []Manual[]Template  []Copied  Intensive Care Follow-up      Hospital:  LOS: 3 days   Mr. Valente Arndt Jr., 48 y.o. male is followed for:   ICH (intracerebral hemorrhage) (CMS/[facility])         Subjective      Subjective   Interval History:  The chart is been reviewed. The patient has remained afebrile. He has required some when necessary labetalol for blood pressures greater than 1 60 mmHg. He states that he has not had any problems with visual changes or headache. He has had some weakness of his right upper extremity which is stable.     The patient's relevant past medical, surgical and social history  "were reviewed and updated in Epic as appropriate.         Objective      Objective      Infusions:     sodium chloride 75 mL/hr Last Rate: 75 mL/hr (08/06/18 0200)      Medications:     hydrALAZINE 50 mg Oral Q8H   lisinopril 20 mg Oral Q12H         Vital Sign Min/Max for last 24 hours  Temp  Min: 98.2 °F (36.8 °C)  Max: 98.6 °F (37 °C)   BP  Min: 123/74  Max: 198/75   Pulse  Min: 56  Max: 92   Resp  Min: 18  Max: 22   SpO2  Min: 92 %  Max: 98 %   No Data Recorded        Input/Output for last 24 hour shift  08/06 0701 - 08/07 0700  In: -   Out: 2675 [Urine:2675]   Objective:  General Appearance:  Comfortable, well-appearing, in no acute distress and not in pain.    Vital signs: (most recent): Blood pressure 152/92, pulse 65, temperature 98.6 °F (37 °C), temperature source Oral, resp. rate 18, height 167.6 cm (66\"), weight 136 kg (300 lb), SpO2 95 %.  No fever.    Output: Producing urine.    HEENT: Normal HEENT exam.    Lungs:  Normal effort and normal respiratory rate.  Breath sounds clear to auscultation.  He is not in respiratory distress.  No stridor.  No rales, decreased breath sounds, wheezes or rhonchi.    Heart: Normal rate.  Regular rhythm.  S1 normal and S2 normal.  No murmur or friction rub.   Chest: Symmetric chest wall expansion.   Abdomen: Abdomen is soft and non-distended.  Bowel sounds are normal.   There is no abdominal tenderness.     Extremities: (Decreased 3-4 out of 5  strength on the right.)  Pulses: Distal pulses are intact.    Neurological: Patient is alert and oriented to person, place and time.    Pupils:  Pupils are equal, round, and reactive to light.    Skin:  Warm and dry.  No rash or cyanosis.                     Results from last 7 days  Lab Units 08/07/18  0426 08/06/18  0453 08/04/18  2221   WBC 10*3/mm3 13.17* 12.02* 12.02*   HEMOGLOBIN g/dL 14.2 13.1 14.7   PLATELETS 10*3/mm3 277 276 291         Results from last 7 days  Lab Units 08/07/18  0426 08/06/18  1753 08/06/18  0453 " 08/04/18  2221   SODIUM mmol/L 138  --  143 142   POTASSIUM mmol/L 4.0 4.2 3.3* 3.7   CO2 mmol/L 24.0  --  26.0 26.0   BUN mg/dL 15  --  9 20   CREATININE mg/dL 0.95  --  0.68 1.17   MAGNESIUM mg/dL  --   --  2.1 2.2   PHOSPHORUS mg/dL  --   --  2.2* 1.8*   GLUCOSE mg/dL 98  --  108* 112*      Estimated Creatinine Clearance: 124.7 mL/min (by C-G formula based on SCr of 0.95 mg/dL).               I reviewed the patient's results and images.         Assessment/Plan      Impression       Principal Problem:    ICH (intracerebral hemorrhage) (CMS/[facility])  Active Problems:    Essential hypertension    H/O noncompliance with medical treatment, presenting hazards to health    Class 3 obesity         Plan         Increase hydralazine today. Continue with physical therapy and occupational therapy.  Referral to rehabilitation has been made.  He will transition to the floor today.  Follow-up orders and labs have been placed.     Plan of care and goals reviewed with mulitdisciplinary team at daily rounds.   I discussed the patient's findings and my recommendations with patient, family and nursing staff            Papo Melgar MD, [facility]  Pulmonary and Critical Care Medicine  08/07/18 11:28 AM               Mavis Tyson RN Registered Nurse Signed Case Management  Progress Notes Date of Service: 8/8/2018 10:38 AM         []Manual[]Template  []Copied  Continued Stay Note  [facility]     Patient Name: Valente Arndt Jr.                       MRN: [id number]  Today's Date: 8/8/2018                       Admit Date: 8/4/2018                       Discharge Plan      Row Name 08/08/18 1035           Plan     Plan [facility]     Patient/Family in Agreement with Plan yes     Plan Comments Per Berkley, patient has bed on the stroke unit at [facility] tomorrow, 8/9.  Family to transport.  Discussed with patient and nursing.  CM will continue to follow.  Mavis Tyson RN x.[phone]     Final Discharge Disposition Code 62 -  inpatient rehab facility                  Discharge Codes    No documentation.              Expected Discharge Date and Time      Expected Discharge Date Expected Discharge Time     Aug 9, 2018                  Mavis Tyson RN

## 2018-08-08 NOTE — PROGRESS NOTES
[facility] Medicine Services  PROGRESS NOTE    Patient Name: Valente Arndt Jr.  : 1970  MRN: [id number]    Date of Admission: 2018  Length of Stay: 4  Primary Care Physician: Provider, No Known    Subjective   Subjective     CC:  HTN, uncontrolled    HPI:  Mr. Arndt was sitting at bedside with family members. Doing well without complaint. Denies HA, blurry vision. BP still uncontrolled o/n up to 160s systolic, requiring multiple doses of IV labetalol. No BM since admission, is eating/ drinking well.     Review of Systems  Gen- No fevers, chills  CV- No chest pain, palpitations, -blurry vision, -HA  Resp- No cough, dyspnea  GI- No N/V/D, abd pain. +constipation    Otherwise ROS is negative except as mentioned in the HPI.    Objective   Objective     Vital Signs:   Temp:  [97.9 °F (36.6 °C)-98.8 °F (37.1 °C)] 97.9 °F (36.6 °C)  Heart Rate:  [] 72  Resp:  [16-20] 18  BP: (147-171)/() 147/95  Total (NIH Stroke Scale): 2     Physical Exam:  Constitutional: No acute distress, awake, alert, sitting up on side of bed   HENT: NCAT, mucous membranes moist  Respiratory: Clear to auscultation bilaterally, respiratory effort normal   Cardiovascular: RRR, no murmurs, rubs, or gallops,   Gastrointestinal: Positive bowel sounds, soft, nontender, nondistended  Musculoskeletal: No bilateral ankle edema  Psychiatric: Appropriate affect, cooperative  Neurologic: Oriented x 3, , Cranial Nerves grossly intact to confrontation, speech clear  Skin: No rashes      Results Reviewed:  I have personally reviewed current lab, radiology, and data and agree.      Results from last 7 days  Lab Units 18  0352 18  0426 18  0453 18  2221   WBC 10*3/mm3 14.88* 13.17* 12.02* 12.02*   HEMOGLOBIN g/dL 13.7 14.2 13.1 14.7   HEMATOCRIT % 42.6 43.7 40.5 44.6   PLATELETS 10*3/mm3 289 277 276 291   INR   --   --   --  0.96       Results from last 7 days  Lab Units 18  6043  08/07/18  0426 08/06/18  1753 08/06/18  0453 08/04/18  2221   SODIUM mmol/L 139 138  --  143 142   POTASSIUM mmol/L 3.9 4.0 4.2 3.3* 3.7   CHLORIDE mmol/L 105 106  --  109 104   CO2 mmol/L 25.0 24.0  --  26.0 26.0   BUN mg/dL 17 15  --  9 20   CREATININE mg/dL 0.97 0.95  --  0.68 1.17   GLUCOSE mg/dL 101* 98  --  108* 112*   CALCIUM mg/dL 9.7 9.3  --  8.7 9.7   ALT (SGPT) U/L  --   --   --   --  28   AST (SGOT) U/L  --   --   --   --  24     Estimated Creatinine Clearance: 122.1 mL/min (by C-G formula based on SCr of 0.97 mg/dL).  No results found for: BNP    Microbiology Results Abnormal     None          Imaging Results (last 24 hours)     ** No results found for the last 24 hours. **             I have reviewed the medications.    Assessment/Plan   Assessment / Plan     Hospital Problem List     * (Principal)ICH (intracerebral hemorrhage) (CMS/[facility])    Essential hypertension    H/O noncompliance with medical treatment, presenting hazards to health    Class 3 obesity             Brief Hospital Course to date:  Valente Arndt Jr. is a 48 y.o. male with no PMH who was admitted 8/04 to the pulmonary ICU with R sided weakness, found to have left sided basal ganglia ICH as well as malignant HTN. Neurosurgery evaluated on admission and deemed to not be a candidate for any NSGY intervention. Was placed on cardene gtt for control of HTN and has now been transitioned to oral medication. Patient was transferred from pulm ICU to floor on 8/7 and is now awaiting rehab placement.    Assessment & Plan:  Mr. Arndt is a 48yoM with no PMH admitted with L ICH and malignant HTN    # ICH, left basal ganglia  # HTN, uncontrolled  # H/o noncompliance to treatment  # morbid obesity   # constipation  # hypokalemia, resolved     Plan:  - will plan to increase lisinopril today to 30mg BID and control hydral 50 TID as BP remained uncontrolled o/n  - K normalized, will monitor and and plan to check BMP in AM   - will plan to add bowel  regimen today  - patient with plans to discharge to [facility] inpatient rehab tomorrow 8/9, patient and family made aware of this plan      DVT Prophylaxis:  Mechanical only due to ICH     CODE STATUS:   Code Status and Medical Interventions:   Ordered at: 08/04/18 2218     Code Status:    CPR     Medical Interventions (Level of Support Prior to Arrest):    Full       Disposition: I expect the patient to be discharged to [facility] in 1 days.      Electronically signed by Bridgett Foster MD, 08/08/18, 11:49 AM.

## 2018-08-08 NOTE — PROGRESS NOTES
Continued Stay Note  [facility]     Patient Name: Valente Arndt Jr.  MRN: [id number]  Today's Date: 8/8/2018    Admit Date: 8/4/2018          Discharge Plan     Row Name 08/08/18 1035       Plan    Plan [facility]    Patient/Family in Agreement with Plan yes    Plan Comments Per Berkley, patient has bed on the stroke unit at [facility] tomorrow, 8/9.  Family to transport.  Discussed with patient and nursing.  CM will continue to follow.  Mavis Tyson RN x.[phone]    Final Discharge Disposition Code 62 - inpatient rehab facility              Discharge Codes    No documentation.       Expected Discharge Date and Time     Expected Discharge Date Expected Discharge Time    Aug 9, 2018             Mavis Tyson RN

## 2018-08-08 NOTE — PLAN OF CARE
Problem: Patient Care Overview  Goal: Plan of Care Review  Outcome: Ongoing (interventions implemented as appropriate)   08/08/18 2256   Coping/Psychosocial   Plan of Care Reviewed With patient   Plan of Care Review   Progress improving   OTHER   Outcome Summary Pt with significant increase in functional balance. Pt with multiple FM/GM tasks and exercises this date with display of attention and no loss of patience.

## 2018-08-09 VITALS
WEIGHT: 300 LBS | SYSTOLIC BLOOD PRESSURE: 140 MMHG | RESPIRATION RATE: 16 BRPM | HEART RATE: 82 BPM | HEIGHT: 66 IN | OXYGEN SATURATION: 93 % | BODY MASS INDEX: 48.21 KG/M2 | TEMPERATURE: 98.2 F | DIASTOLIC BLOOD PRESSURE: 97 MMHG

## 2018-08-09 LAB
ANION GAP SERPL CALCULATED.3IONS-SCNC: 13 MMOL/L (ref 3–11)
BUN BLD-MCNC: 17 MG/DL (ref 9–23)
BUN/CREAT SERPL: 17.9 (ref 7–25)
CALCIUM SPEC-SCNC: 9.8 MG/DL (ref 8.7–10.4)
CHLORIDE SERPL-SCNC: 104 MMOL/L (ref 99–109)
CO2 SERPL-SCNC: 24 MMOL/L (ref 20–31)
CREAT BLD-MCNC: 0.95 MG/DL (ref 0.6–1.3)
GFR SERPL CREATININE-BSD FRML MDRD: 85 ML/MIN/1.73
GLUCOSE BLD-MCNC: 109 MG/DL (ref 70–100)
POTASSIUM BLD-SCNC: 3.9 MMOL/L (ref 3.5–5.5)
SODIUM BLD-SCNC: 141 MMOL/L (ref 132–146)

## 2018-08-09 PROCEDURE — 99239 HOSP IP/OBS DSCHRG MGMT >30: CPT | Performed by: NURSE PRACTITIONER

## 2018-08-09 PROCEDURE — 80048 BASIC METABOLIC PNL TOTAL CA: CPT | Performed by: INTERNAL MEDICINE

## 2018-08-09 RX ORDER — LISINOPRIL 30 MG/1
30 TABLET ORAL EVERY 12 HOURS SCHEDULED
Start: 2018-08-09 | End: 2018-09-04 | Stop reason: SDUPTHER

## 2018-08-09 RX ORDER — SENNA AND DOCUSATE SODIUM 50; 8.6 MG/1; MG/1
2 TABLET, FILM COATED ORAL 2 TIMES DAILY
Start: 2018-08-09 | End: 2018-10-08

## 2018-08-09 RX ORDER — HYDRALAZINE HYDROCHLORIDE 50 MG/1
50 TABLET, FILM COATED ORAL EVERY 8 HOURS SCHEDULED
Start: 2018-08-09 | End: 2018-09-04 | Stop reason: SDUPTHER

## 2018-08-09 RX ADMIN — LABETALOL HYDROCHLORIDE 10 MG: 5 INJECTION INTRAVENOUS at 13:16

## 2018-08-09 RX ADMIN — HYDRALAZINE HYDROCHLORIDE 50 MG: 50 TABLET, FILM COATED ORAL at 05:31

## 2018-08-09 RX ADMIN — LISINOPRIL 30 MG: 10 TABLET ORAL at 08:44

## 2018-08-09 RX ADMIN — HYDRALAZINE HYDROCHLORIDE 50 MG: 50 TABLET, FILM COATED ORAL at 14:18

## 2018-08-09 NOTE — PROGRESS NOTES
Case Management Discharge Note    Final Note: Plan is an acute rehab bed on the stroke unit at [facility] today, 8/9.  Family to transport.  Nurse to call report to [phone].  CM will fax transfer summary when available to [phone].  Please place a copy of the transfer summary and any scripts in the packet to be sent to the facility with the patient.  Mavis Tyson RN [phone]    Destination - Selection Complete     Service Request Status Selected Specialties Address Phone Number Fax Number    [facility] Selected Rehabilitation [address] [address] [phone]      Durable Medical Equipment     No service has been selected for the patient.      Dialysis/Infusion     No service has been selected for the patient.      Home Medical Care     No service has been selected for the patient.      Social Care     No service has been selected for the patient.             Final Discharge Disposition Code: 62 - inpatient rehab facility

## 2018-08-09 NOTE — PAYOR COMM NOTE
"Valente Arndt  (48 y.o. Male)     Date of Birth Social Security Number Address Home Phone MRN    1970  [facility] [address]  [facility] [address] [phone] [id number]    Christianity Marital Status          None Single       Admission Date Admission Type Admitting Provider Attending Provider Department, Room/Bed    18 Emergency Bridgett Foster MD Hunter, Sarah M, MD [facility] 3F, S312/1    Discharge Date Discharge Disposition Discharge Destination         Rehab Facility or Unit (DC - External)              Attending Provider:  Bridgett Foster MD    Allergies:  No Known Allergies    Isolation:  None   Infection:  None   Code Status:  CPR    Ht:  167.6 cm (66\")   Wt:  136 kg (300 lb)    Admission Cmt:  None   Principal Problem:  ICH (intracerebral hemorrhage) (CMS/HCC) [I61.9]                 Active Insurance as of 2018     Primary Coverage     Payor Plan Insurance Group Employer/Plan Group    On license of [facility] [employer] On license of [facility] [employer] on Request [facility] PPO [id number]     Payor Plan Address Payor Plan Phone Number Effective From Effective To    [address] [phone] 2014     [facility] [id number]       Subscriber Name Subscriber Birth Date Member ID       VALENTE ARNDT JR. 1970 [id number]                 Emergency Contacts      (Rel.) Home Phone Work Phone Mobile Phone    Valente Arndt Sr (Father) [phone] -- [phone]               Discharge Summary      Eula Dubon APRN at 2018  9:20 AM              [facility]  DISCHARGE SUMMARY    Patient Name: Valente Arndt Jr.  : 1970  MRN: [id number]    Date of Admission: 2018  Date of Discharge: 2018  Primary Care Physician: Provider, No Known    Consults     Date and Time Order Name Status Description    2018 Inpatient Neurosurgery Consult Completed     2018 Consult Interventional Neurologist and/or Stroke Team          Hospital Course " "    Presenting Problem:   ICH (intracerebral hemorrhage) (CMS/HCC) [I61.9]    Active Hospital Problems    Diagnosis Date Noted   • **ICH (intracerebral hemorrhage) (CMS/HCC) [I61.9] 08/04/2018   • Other constipation [K59.09] 08/08/2018   • Essential hypertension [I10] 08/05/2018   • H/O noncompliance with medical treatment, presenting hazards to health [Z91.19] 08/05/2018   • Class 3 obesity [E66.09, Z68.42] 08/05/2018      Resolved Hospital Problems    Diagnosis Date Noted Date Resolved   No resolved problems to display.          Hospital Course:  Valente Arndt Jr. is a 48 y.o. male who presented to the ED with sudden onset of right sided weakness and a feeling of \"drunkeness\".  Imaging revealed left sided basal ganglia hemorrhage.  His systolic bp was in the 170's on EMS arrival.  He was started on a cardene gtt and admitted to the ICU for observation.  Neurosurgery was consulted and there was no need for intervention other than blood pressure control.  Repeat imaging showed no change in the appearance of his ICH.  He has continued to show improvement and has been transitioned to oral meds with improved control.  He is felt to be stable and ready for dc to [facility] for continued rehab.    Please note his elevated WBC count since admission.  CXR showed no infiltrate, he denies cough or urinary complaints, and he has remained afebrile.  It is felt this is reactive and should be repeated in a few days.      Discharge Follow Up Recommendations for labs/diagnostics:  Follow up with new PCP as arranged        Day of Discharge     HPI:   No issues overnight  BP improved  Denies HA or vision changes    Review of Systems  Gen- No fevers, chills  CV- No chest pain, palpitations  Resp- No cough, dyspnea  GI- No N/V/D, abd pain    Otherwise ROS is negative except as mentioned in the HPI.    Vital Signs:   Temp:  [98 °F (36.7 °C)-99.3 °F (37.4 °C)] 98.1 °F (36.7 °C)  Heart Rate:  [57-85] 75  Resp:  [16-20] (P) 16  BP: " (144-158)/() 144/90     Physical Exam:  Constitutional: No acute distress, awake, alert  HENT: NCAT, mucous membranes moist  Respiratory: Clear to auscultation bilaterally, respiratory effort normal   Cardiovascular: RRR, no murmurs, rubs, or gallops, palpable pedal pulses bilaterally  Gastrointestinal: Positive bowel sounds, soft, nontender, nondistended, obese  Musculoskeletal: No bilateral ankle edema  Psychiatric: Appropriate affect, cooperative  Neurologic: Oriented x 3, right side weakness, speech clear  Skin: No rashes      Pertinent  and/or Most Recent Results       Results from last 7 days  Lab Units 08/09/18  0336 08/08/18  0352 08/07/18  0426 08/06/18  1753 08/06/18  0453 08/04/18  2221   WBC 10*3/mm3  --  14.88* 13.17*  --  12.02* 12.02*   HEMOGLOBIN g/dL  --  13.7 14.2  --  13.1 14.7   HEMATOCRIT %  --  42.6 43.7  --  40.5 44.6   PLATELETS 10*3/mm3  --  289 277  --  276 291   SODIUM mmol/L 141 139 138  --  143 142   POTASSIUM mmol/L 3.9 3.9 4.0 4.2 3.3* 3.7   CHLORIDE mmol/L 104 105 106  --  109 104   CO2 mmol/L 24.0 25.0 24.0  --  26.0 26.0   BUN mg/dL 17 17 15  --  9 20   CREATININE mg/dL 0.95 0.97 0.95  --  0.68 1.17   GLUCOSE mg/dL 109* 101* 98  --  108* 112*   CALCIUM mg/dL 9.8 9.7 9.3  --  8.7 9.7       Results from last 7 days  Lab Units 08/04/18  2221   BILIRUBIN mg/dL 0.3   ALK PHOS U/L 108*   ALT (SGPT) U/L 28   AST (SGOT) U/L 24   PROTIME Seconds 10.1   INR  0.96   APTT seconds 27.5       Results from last 7 days  Lab Units 08/05/18  0421   CHOLESTEROL mg/dL 179   TRIGLYCERIDES mg/dL 50   HDL CHOL mg/dL 43       Results from last 7 days  Lab Units 08/05/18  0421   HEMOGLOBIN A1C % 6.00*     Brief Urine Lab Results     None          Microbiology Results Abnormal     None          Imaging Results (all)     Procedure Component Value Units Date/Time    CT Head Without Contrast [[id number]] Collected:  08/05/18 0826     Updated:  08/07/18 0817    Narrative:       EXAMINATION: CT HEAD WO  CONTRAST - 8/5/2018     INDICATION: I61.2-Nontraumatic intracerebral hemorrhage in hemisphere,  unspecified; I10-Essential (primary) hypertension; R53.1-Weakness;  R47.1-Dysarthria and anarthria.      TECHNIQUE: Axial CT of the head without intravenous contrast  administration.     The radiation dose reduction device was turned on for each scan per the  ALARA (As Low as Reasonably Achievable) protocol.     COMPARISON: 8/4/2018.     FINDINGS: No significant interval change in left basal ganglia  hemorrhage measuring 28 x 18 mm, previously 30 x 18 mm, with surrounding  edema and mild effacement of the left lateral ventricle, however, no  midline shift. No hydrocephalus development. No new parenchymal  hemorrhage or intraventricular extension. Globes and orbits  unremarkable. Visualized paranasal sinuses and mastoid air cells are  grossly clear and well pneumatized. Calvarium intact. Hyperostosis  frontalis.       Impression:       No significant interval change in left basal ganglia  hemorrhage with mild mass effect and minimal effacement of the left  lateral ventricle, however, no midline shift, hydrocephalus development  or intraventricular extension.     DICTATED:   8/5/2018  EDITED/ls :   8/5/2018      This report was finalized on 8/7/2018 8:15 AM by Dr. Harry Rousseau.       XR Chest 1 View [[id number]] Collected:  08/04/18 2207     Updated:  08/04/18 2322    Narrative:       EXAM:    XR Chest, 1 View     EXAM DATE/TIME:    8/4/2018 10:07 PM     CLINICAL HISTORY:    48 years old, male; Signs and symptoms; Other: Stroke protocol; Additional   info: Acute stroke protocol (onset < 12 hrs)     TECHNIQUE:    XR of the chest, 1 view.     COMPARISON:    No relevant prior studies available.     FINDINGS:    Lungs:  There are low lung volumes bilaterally. There is no confluent   infiltrate.    Pleural space: No pleural effusions. No pneumothorax.    Heart/Mediastinum:  The cardiac silhouette appears enlarged.     Bones/joints:  Hypertrophic degenerative changes are noted within the spine.       Impression:       1. There are low lung volumes bilaterally. There is no confluent infiltrate.   2. The cardiac silhouette appears enlarged.     THIS DOCUMENT HAS BEEN ELECTRONICALLY SIGNED BY JAMIE JASMINE MD    CT Head Without Contrast [[id number]] Collected:  08/04/18 2201     Updated:  08/04/18 2217    Addenda:        Dr. Ya has seen the report, no questions, 8/4/2018 10:17 PM EDT.     THIS DOCUMENT HAS BEEN ELECTRONICALLY SIGNED BY JAMIE JASMINE MD  Signed:  08/04/18 2217 by Jamie Jasmine MD    Narrative:       EXAM:    CT Head Without Intravenous Contrast    EXAM DATE/TIME:    8/4/2018 10:01 PM    CLINICAL HISTORY:    The patient age is 48 years old and is male; Signs and symptoms; Weakness,   extremity; Right; Additional info: Stroke Right sided weakness    TECHNIQUE:    Axial computed tomography images of the head/brain without intravenous   contrast.  All CT scans at this facility use at least one of these dose   optimization techniques: automated exposure control; mA and/or kV adjustment   per patient size (includes targeted exams where dose is matched to clinical   indication); or iterative reconstruction.    COMPARISON:    No relevant prior studies available.    FINDINGS:    Brain:  Hyperdense acute hemorrhage is identified within the left basal   ganglia, with a zone of mild hypodense surrounding edema. This area of   hemorrhage measures 3.0 x 1.6 x 3.0 cm. This hemorrhage/edema involves the   lentiform nuclei and posterior limb of the left internal capsule. Differential   considerations include hemorrhagic conversion of infarct and hypertensive   bleed, although additional hemorrhagic pathology cannot be excluded.  The   white-gray differentiation is otherwise preserved.    Ventricles:  There is mild asymmetry of the lateral ventricles, without   midline shift to the right.    Bones/joints:  The calvarium  demonstrates no evidence for a depressed   fracture.    Soft tissues:  No acute abnormality.    Sinuses:  Unremarkable as visualized.  No acute sinusitis.    Mastoid air cells:  No mastoid effusion.      Impression:       1.  Hyperdense acute hemorrhage is identified within the left basal ganglia,   with a zone of mild hypodense surrounding edema. Differential considerations   include hemorrhagic conversion of infarct and hypertensive bleed, although   additional hemorrhagic pathology cannot be excluded. A follow-up CT in 12-24   hours is recommended.  2.  [place] Stroke Program Early CT Score (ASPECTS) = 8    THIS DOCUMENT HAS BEEN ELECTRONICALLY SIGNED BY SEPIDEH STOLL MD              Discharge Details        Discharge Medications      New Medications      Instructions Start Date   hydrALAZINE 50 MG tablet  Commonly known as:  APRESOLINE   50 mg, Oral, Every 8 Hours Scheduled      lisinopril 30 MG tablet  Commonly known as:  PRINIVIL,ZESTRIL   30 mg, Oral, Every 12 Hours Scheduled      polyethylene glycol pack packet  Commonly known as:  MIRALAX   17 g, Oral, Daily      sennosides-docusate sodium 8.6-50 MG tablet  Commonly known as:  SENOKOT-S   2 tablets, Oral, 2 Times Daily         Stop These Medications    naproxen sodium 220 MG tablet  Commonly known as:  ALEVE              Discharge Disposition:  Rehab Facility or Unit (DC - External)    Discharge Diet:  Diet Instructions     Diet: Regular, Cardiac; Thin       Discharge Diet:   Regular  Cardiac       Fluid Consistency:  Thin          Discharge Activity:   Activity Instructions     Activity as Tolerated               Code Status/Level of Support:  Code Status and Medical Interventions:   Ordered at: 08/04/18 2218     Code Status:    CPR     Medical Interventions (Level of Support Prior to Arrest):    Full       Future Appointments  Date Time Provider Department Center   9/4/2018 1:15 PM Raoul Anthony DO [facility] None       Additional Instructions  for the Follow-ups that You Need to Schedule     Discharge Follow-up with PCP    As directed      Currently Documented PCP:  Provider, No Known  PCP Phone Number:  None    Follow Up Details:  after dc from [facility] as scheduled in [facility] on 9.4.18               Time Spent on Discharge:  35 minutes    Electronically signed by JOSE Gregorio, 08/09/18, 9:31 AM.        Electronically signed by Eula Dubon APRN at 8/9/2018 11:21 AM

## 2018-08-09 NOTE — DISCHARGE PLACEMENT REQUEST
"Valente Arndt  (48 y.o. Male)     Mavis Tyson, RN  [phone]    Date of Birth Social Security Number Address Home Phone MRN    1970  [address]  [facility] [address] [phone] [id number]    Restorationist Marital Status          None Single       Admission Date Admission Type Admitting Provider Attending Provider Department, Room/Bed    18 Emergency Bridgett Foster MD Hunter, Sarah M, MD [facility] 3F, S312/1    Discharge Date Discharge Disposition Discharge Destination         Rehab Facility or Unit (DC - External)              Attending Provider:  Bridgett Foster MD    Allergies:  No Known Allergies    Isolation:  None   Infection:  None   Code Status:  CPR    Ht:  167.6 cm (66\")   Wt:  136 kg (300 lb)    Admission Cmt:  None   Principal Problem:  ICH (intracerebral hemorrhage) (CMS/[facility]) [I61.9]                 Active Insurance as of 2018     Primary Coverage     Payor Plan Insurance Group Employer/Plan Group    [employer] [place] [employer] [facility] [id number]     Payor Plan Address Payor Plan Phone Number Effective From Effective To    [address] [phone] 2014     [facility] [id number]       Subscriber Name Subscriber Birth Date Member ID       VALENTE ARNDT JR. 1970 [id number]                 Emergency Contacts      (Rel.) Home Phone Work Phone Mobile Phone    Valente Arndt Sr (Father) [phone] -- [phone]                 Discharge Summary      Eula Dubon APRN at 2018  9:20 AM              [facility] Medicine Services  DISCHARGE SUMMARY    Patient Name: Valente Arndt Jr.  : 1970  MRN: [id number]    Date of Admission: 2018  Date of Discharge: 2018  Primary Care Physician: Provider, No Known    Consults     Date and Time Order Name Status Description    2018 2218 Inpatient Neurosurgery Consult Completed     [id number] Consult Interventional Neurologist " "and/or Stroke Team          Hospital Course     Presenting Problem:   ICH (intracerebral hemorrhage) (CMS/HCC) [I61.9]    Active Hospital Problems    Diagnosis Date Noted   • **ICH (intracerebral hemorrhage) (CMS/HCC) [I61.9] 08/04/2018   • Other constipation [K59.09] 08/08/2018   • Essential hypertension [I10] 08/05/2018   • H/O noncompliance with medical treatment, presenting hazards to health [Z91.19] 08/05/2018   • Class 3 obesity [E66.09, Z68.42] 08/05/2018      Resolved Hospital Problems    Diagnosis Date Noted Date Resolved   No resolved problems to display.          Hospital Course:  Valente Arndt Jr. is a 48 y.o. male who presented to the ED with sudden onset of right sided weakness and a feeling of \"drunkeness\".  Imaging revealed left sided basal ganglia hemorrhage.  His systolic bp was in the 170's on EMS arrival.  He was started on a cardene gtt and admitted to the ICU for observation.  Neurosurgery was consulted and there was no need for intervention other than blood pressure control.  Repeat imaging showed no change in the appearance of his ICH.  He has continued to show improvement and has been transitioned to oral meds with improved control.  He is felt to be stable and ready for dc to [facility] for continued rehab.      Discharge Follow Up Recommendations for labs/diagnostics:  Follow up with new PCP as arranged        Day of Discharge     HPI:   No issues overnight  BP improved  Denies HA or vision changes    Review of Systems  Gen- No fevers, chills  CV- No chest pain, palpitations  Resp- No cough, dyspnea  GI- No N/V/D, abd pain    Otherwise ROS is negative except as mentioned in the HPI.    Vital Signs:   Temp:  [98 °F (36.7 °C)-99.3 °F (37.4 °C)] 98.1 °F (36.7 °C)  Heart Rate:  [57-85] 75  Resp:  [16-20] (P) 16  BP: (144-158)/() 144/90     Physical Exam:  Constitutional: No acute distress, awake, alert  HENT: NCAT, mucous membranes moist  Respiratory: Clear to auscultation bilaterally, " respiratory effort normal   Cardiovascular: RRR, no murmurs, rubs, or gallops, palpable pedal pulses bilaterally  Gastrointestinal: Positive bowel sounds, soft, nontender, nondistended, obese  Musculoskeletal: No bilateral ankle edema  Psychiatric: Appropriate affect, cooperative  Neurologic: Oriented x 3, right side weakness, speech clear  Skin: No rashes      Pertinent  and/or Most Recent Results       Results from last 7 days  Lab Units 08/09/18  0336 08/08/18  0352 08/07/18  0426 08/06/18  1753 08/06/18  0453 08/04/18  2221   WBC 10*3/mm3  --  14.88* 13.17*  --  12.02* 12.02*   HEMOGLOBIN g/dL  --  13.7 14.2  --  13.1 14.7   HEMATOCRIT %  --  42.6 43.7  --  40.5 44.6   PLATELETS 10*3/mm3  --  289 277  --  276 291   SODIUM mmol/L 141 139 138  --  143 142   POTASSIUM mmol/L 3.9 3.9 4.0 4.2 3.3* 3.7   CHLORIDE mmol/L 104 105 106  --  109 104   CO2 mmol/L 24.0 25.0 24.0  --  26.0 26.0   BUN mg/dL 17 17 15  --  9 20   CREATININE mg/dL 0.95 0.97 0.95  --  0.68 1.17   GLUCOSE mg/dL 109* 101* 98  --  108* 112*   CALCIUM mg/dL 9.8 9.7 9.3  --  8.7 9.7       Results from last 7 days  Lab Units 08/04/18  2221   BILIRUBIN mg/dL 0.3   ALK PHOS U/L 108*   ALT (SGPT) U/L 28   AST (SGOT) U/L 24   PROTIME Seconds 10.1   INR  0.96   APTT seconds 27.5       Results from last 7 days  Lab Units 08/05/18  0421   CHOLESTEROL mg/dL 179   TRIGLYCERIDES mg/dL 50   HDL CHOL mg/dL 43       Results from last 7 days  Lab Units 08/05/18  0421   HEMOGLOBIN A1C % 6.00*     Brief Urine Lab Results     None          Microbiology Results Abnormal     None          Imaging Results (all)     Procedure Component Value Units Date/Time    CT Head Without Contrast [[id number]] Collected:  08/05/18 0826     Updated:  08/07/18 0817    Narrative:       EXAMINATION: CT HEAD WO CONTRAST - 8/5/2018     INDICATION: I61.2-Nontraumatic intracerebral hemorrhage in hemisphere,  unspecified; I10-Essential (primary) hypertension; R53.1-Weakness;  R47.1-Dysarthria and  anarthria.      TECHNIQUE: Axial CT of the head without intravenous contrast  administration.     The radiation dose reduction device was turned on for each scan per the  ALARA (As Low as Reasonably Achievable) protocol.     COMPARISON: 8/4/2018.     FINDINGS: No significant interval change in left basal ganglia  hemorrhage measuring 28 x 18 mm, previously 30 x 18 mm, with surrounding  edema and mild effacement of the left lateral ventricle, however, no  midline shift. No hydrocephalus development. No new parenchymal  hemorrhage or intraventricular extension. Globes and orbits  unremarkable. Visualized paranasal sinuses and mastoid air cells are  grossly clear and well pneumatized. Calvarium intact. Hyperostosis  frontalis.       Impression:       No significant interval change in left basal ganglia  hemorrhage with mild mass effect and minimal effacement of the left  lateral ventricle, however, no midline shift, hydrocephalus development  or intraventricular extension.     DICTATED:   8/5/2018  EDITED/ls :   8/5/2018      This report was finalized on 8/7/2018 8:15 AM by Dr. Harry Rousseau.       XR Chest 1 View [[id number]] Collected:  08/04/18 2207     Updated:  08/04/18 2322    Narrative:       EXAM:    XR Chest, 1 View     EXAM DATE/TIME:    8/4/2018 10:07 PM     CLINICAL HISTORY:    48 years old, male; Signs and symptoms; Other: Stroke protocol; Additional   info: Acute stroke protocol (onset < 12 hrs)     TECHNIQUE:    XR of the chest, 1 view.     COMPARISON:    No relevant prior studies available.     FINDINGS:    Lungs:  There are low lung volumes bilaterally. There is no confluent   infiltrate.    Pleural space: No pleural effusions. No pneumothorax.    Heart/Mediastinum:  The cardiac silhouette appears enlarged.    Bones/joints:  Hypertrophic degenerative changes are noted within the spine.       Impression:       1. There are low lung volumes bilaterally. There is no confluent infiltrate.   2. The cardiac  silhouette appears enlarged.     THIS DOCUMENT HAS BEEN ELECTRONICALLY SIGNED BY JAMIE JASMINE MD    CT Head Without Contrast [[id number]] Collected:  08/04/18 2201     Updated:  08/04/18 2217    Addenda:        Dr. Ya has seen the report, no questions, 8/4/2018 10:17 PM EDT.     THIS DOCUMENT HAS BEEN ELECTRONICALLY SIGNED BY JAMIE JASMINE MD  Signed:  08/04/18 2217 by Jamie Jasmine MD    Narrative:       EXAM:    CT Head Without Intravenous Contrast    EXAM DATE/TIME:    8/4/2018 10:01 PM    CLINICAL HISTORY:    The patient age is 48 years old and is male; Signs and symptoms; Weakness,   extremity; Right; Additional info: Stroke Right sided weakness    TECHNIQUE:    Axial computed tomography images of the head/brain without intravenous   contrast.  All CT scans at this facility use at least one of these dose   optimization techniques: automated exposure control; mA and/or kV adjustment   per patient size (includes targeted exams where dose is matched to clinical   indication); or iterative reconstruction.    COMPARISON:    No relevant prior studies available.    FINDINGS:    Brain:  Hyperdense acute hemorrhage is identified within the left basal   ganglia, with a zone of mild hypodense surrounding edema. This area of   hemorrhage measures 3.0 x 1.6 x 3.0 cm. This hemorrhage/edema involves the   lentiform nuclei and posterior limb of the left internal capsule. Differential   considerations include hemorrhagic conversion of infarct and hypertensive   bleed, although additional hemorrhagic pathology cannot be excluded.  The   white-gray differentiation is otherwise preserved.    Ventricles:  There is mild asymmetry of the lateral ventricles, without   midline shift to the right.    Bones/joints:  The calvarium demonstrates no evidence for a depressed   fracture.    Soft tissues:  No acute abnormality.    Sinuses:  Unremarkable as visualized.  No acute sinusitis.    Mastoid air cells:  No mastoid  effusion.      Impression:       1.  Hyperdense acute hemorrhage is identified within the left basal ganglia,   with a zone of mild hypodense surrounding edema. Differential considerations   include hemorrhagic conversion of infarct and hypertensive bleed, although   additional hemorrhagic pathology cannot be excluded. A follow-up CT in 12-24   hours is recommended.  2.  Alberta Stroke Program Early CT Score (ASPECTS) = 8    THIS DOCUMENT HAS BEEN ELECTRONICALLY SIGNED BY SEPIDEH STOLL MD              Discharge Details        Discharge Medications      New Medications      Instructions Start Date   hydrALAZINE 50 MG tablet  Commonly known as:  APRESOLINE   50 mg, Oral, Every 8 Hours Scheduled      lisinopril 30 MG tablet  Commonly known as:  PRINIVIL,ZESTRIL   30 mg, Oral, Every 12 Hours Scheduled      polyethylene glycol pack packet  Commonly known as:  MIRALAX   17 g, Oral, Daily      sennosides-docusate sodium 8.6-50 MG tablet  Commonly known as:  SENOKOT-S   2 tablets, Oral, 2 Times Daily         Stop These Medications    naproxen sodium 220 MG tablet  Commonly known as:  ALEVE              Discharge Disposition:  Rehab Facility or Unit (DC - External)    Discharge Diet:  Diet Instructions     Diet: Regular, Cardiac; Thin       Discharge Diet:   Regular  Cardiac       Fluid Consistency:  Thin          Discharge Activity:   Activity Instructions     Activity as Tolerated               Code Status/Level of Support:  Code Status and Medical Interventions:   Ordered at: 08/04/18 2218     Code Status:    CPR     Medical Interventions (Level of Support Prior to Arrest):    Full       Future Appointments  Date Time Provider Department Center   9/4/2018 1:15 PM Raoul Anthony DO [facility] None       Additional Instructions for the Follow-ups that You Need to Schedule     Discharge Follow-up with PCP    As directed      Currently Documented PCP:  Provider, No Known  PCP Phone Number:  None    Follow Up Details:   after dc from [facility] as scheduled in [facility] on 9.4.18               Time Spent on Discharge:  35 minutes    Electronically signed by JOSE Gregorio, 08/09/18, 9:31 AM.        Electronically signed by Eula Dubon APRN at 8/9/2018  9:48 AM

## 2018-08-09 NOTE — PLAN OF CARE
Problem: Patient Care Overview  Goal: Plan of Care Review  Outcome: Ongoing (interventions implemented as appropriate)   08/09/18 0458   Coping/Psychosocial   Plan of Care Reviewed With patient   Plan of Care Review   Progress improving       Problem: Fall Risk (Adult)  Goal: Absence of Fall  Outcome: Ongoing (interventions implemented as appropriate)      Problem: Skin Injury Risk (Adult)  Goal: Skin Health and Integrity  Outcome: Outcome(s) achieved Date Met: 08/09/18

## 2018-08-09 NOTE — DISCHARGE SUMMARY
"    [facility] Medicine Services  DISCHARGE SUMMARY    Patient Name: Valente Arndt Jr.  : 1970  MRN: [id number]    Date of Admission: 2018  Date of Discharge: 2018  Primary Care Physician: Provider, No Known    Consults     Date and Time Order Name Status Description    2018 Inpatient Neurosurgery Consult Completed     2018 Consult Interventional Neurologist and/or Stroke Team          Hospital Course     Presenting Problem:   ICH (intracerebral hemorrhage) (CMS/HCC) [I61.9]    Active Hospital Problems    Diagnosis Date Noted   • **ICH (intracerebral hemorrhage) (CMS/HCC) [I61.9] 2018   • Other constipation [K59.09] 2018   • Essential hypertension [I10] 2018   • H/O noncompliance with medical treatment, presenting hazards to health [Z91.19] 2018   • Class 3 obesity [E66.09, Z68.42] 2018      Resolved Hospital Problems    Diagnosis Date Noted Date Resolved   No resolved problems to display.          Hospital Course:  Valente Arndt Jr. is a 48 y.o. male who presented to the ED with sudden onset of right sided weakness and a feeling of \"drunkeness\".  Imaging revealed left sided basal ganglia hemorrhage.  His systolic bp was in the 170's on EMS arrival.  He was started on a cardene gtt and admitted to the ICU for observation.  Neurosurgery was consulted and there was no need for intervention other than blood pressure control.  Repeat imaging showed no change in the appearance of his ICH.  He has continued to show improvement and has been transitioned to oral meds with improved control.  He is felt to be stable and ready for dc to [facility] for continued rehab.    Please note his elevated WBC count since admission.  CXR showed no infiltrate, he denies cough or urinary complaints, and he has remained afebrile.  It is felt this is reactive and should be repeated in a few days.      Discharge Follow Up Recommendations for " labs/diagnostics:  Follow up with new PCP as arranged        Day of Discharge     HPI:   No issues overnight  BP improved  Denies HA or vision changes    Review of Systems  Gen- No fevers, chills  CV- No chest pain, palpitations  Resp- No cough, dyspnea  GI- No N/V/D, abd pain    Otherwise ROS is negative except as mentioned in the HPI.    Vital Signs:   Temp:  [98 °F (36.7 °C)-99.3 °F (37.4 °C)] 98.1 °F (36.7 °C)  Heart Rate:  [57-85] 75  Resp:  [16-20] (P) 16  BP: (144-158)/() 144/90     Physical Exam:  Constitutional: No acute distress, awake, alert  HENT: NCAT, mucous membranes moist  Respiratory: Clear to auscultation bilaterally, respiratory effort normal   Cardiovascular: RRR, no murmurs, rubs, or gallops, palpable pedal pulses bilaterally  Gastrointestinal: Positive bowel sounds, soft, nontender, nondistended, obese  Musculoskeletal: No bilateral ankle edema  Psychiatric: Appropriate affect, cooperative  Neurologic: Oriented x 3, right side weakness, speech clear  Skin: No rashes      Pertinent  and/or Most Recent Results       Results from last 7 days  Lab Units 08/09/18  0336 08/08/18  0352 08/07/18  0426 08/06/18  1753 08/06/18  0453 08/04/18  2221   WBC 10*3/mm3  --  14.88* 13.17*  --  12.02* 12.02*   HEMOGLOBIN g/dL  --  13.7 14.2  --  13.1 14.7   HEMATOCRIT %  --  42.6 43.7  --  40.5 44.6   PLATELETS 10*3/mm3  --  289 277  --  276 291   SODIUM mmol/L 141 139 138  --  143 142   POTASSIUM mmol/L 3.9 3.9 4.0 4.2 3.3* 3.7   CHLORIDE mmol/L 104 105 106  --  109 104   CO2 mmol/L 24.0 25.0 24.0  --  26.0 26.0   BUN mg/dL 17 17 15  --  9 20   CREATININE mg/dL 0.95 0.97 0.95  --  0.68 1.17   GLUCOSE mg/dL 109* 101* 98  --  108* 112*   CALCIUM mg/dL 9.8 9.7 9.3  --  8.7 9.7       Results from last 7 days  Lab Units 08/04/18  2221   BILIRUBIN mg/dL 0.3   ALK PHOS U/L 108*   ALT (SGPT) U/L 28   AST (SGOT) U/L 24   PROTIME Seconds 10.1   INR  0.96   APTT seconds 27.5       Results from last 7 days  Lab Units  08/05/18  0421   CHOLESTEROL mg/dL 179   TRIGLYCERIDES mg/dL 50   HDL CHOL mg/dL 43       Results from last 7 days  Lab Units 08/05/18  0421   HEMOGLOBIN A1C % 6.00*     Brief Urine Lab Results     None          Microbiology Results Abnormal     None          Imaging Results (all)     Procedure Component Value Units Date/Time    CT Head Without Contrast [[id number]] Collected:  08/05/18 0826     Updated:  08/07/18 0817    Narrative:       EXAMINATION: CT HEAD WO CONTRAST - 8/5/2018     INDICATION: I61.2-Nontraumatic intracerebral hemorrhage in hemisphere,  unspecified; I10-Essential (primary) hypertension; R53.1-Weakness;  R47.1-Dysarthria and anarthria.      TECHNIQUE: Axial CT of the head without intravenous contrast  administration.     The radiation dose reduction device was turned on for each scan per the  ALARA (As Low as Reasonably Achievable) protocol.     COMPARISON: 8/4/2018.     FINDINGS: No significant interval change in left basal ganglia  hemorrhage measuring 28 x 18 mm, previously 30 x 18 mm, with surrounding  edema and mild effacement of the left lateral ventricle, however, no  midline shift. No hydrocephalus development. No new parenchymal  hemorrhage or intraventricular extension. Globes and orbits  unremarkable. Visualized paranasal sinuses and mastoid air cells are  grossly clear and well pneumatized. Calvarium intact. Hyperostosis  frontalis.       Impression:       No significant interval change in left basal ganglia  hemorrhage with mild mass effect and minimal effacement of the left  lateral ventricle, however, no midline shift, hydrocephalus development  or intraventricular extension.     DICTATED:   8/5/2018  EDITED/ls :   8/5/2018      This report was finalized on 8/7/2018 8:15 AM by Dr. Harry Rousseau.       XR Chest 1 View [[id number]] Collected:  08/04/18 2207     Updated:  08/04/18 6232    Narrative:       EXAM:    XR Chest, 1 View     EXAM DATE/TIME:    8/4/2018 10:07 PM     CLINICAL  HISTORY:    48 years old, male; Signs and symptoms; Other: Stroke protocol; Additional   info: Acute stroke protocol (onset < 12 hrs)     TECHNIQUE:    XR of the chest, 1 view.     COMPARISON:    No relevant prior studies available.     FINDINGS:    Lungs:  There are low lung volumes bilaterally. There is no confluent   infiltrate.    Pleural space: No pleural effusions. No pneumothorax.    Heart/Mediastinum:  The cardiac silhouette appears enlarged.    Bones/joints:  Hypertrophic degenerative changes are noted within the spine.       Impression:       1. There are low lung volumes bilaterally. There is no confluent infiltrate.   2. The cardiac silhouette appears enlarged.     THIS DOCUMENT HAS BEEN ELECTRONICALLY SIGNED BY JAMIE JASMINE MD    CT Head Without Contrast [[id number]] Collected:  08/04/18 2201     Updated:  08/04/18 2217    Addenda:        Dr. Ya has seen the report, no questions, 8/4/2018 10:17 PM EDT.     THIS DOCUMENT HAS BEEN ELECTRONICALLY SIGNED BY JAMIE JASMINE MD  Signed:  08/04/18 2217 by Jamie Jasmine MD    Narrative:       EXAM:    CT Head Without Intravenous Contrast    EXAM DATE/TIME:    8/4/2018 10:01 PM    CLINICAL HISTORY:    The patient age is 48 years old and is male; Signs and symptoms; Weakness,   extremity; Right; Additional info: Stroke Right sided weakness    TECHNIQUE:    Axial computed tomography images of the head/brain without intravenous   contrast.  All CT scans at this facility use at least one of these dose   optimization techniques: automated exposure control; mA and/or kV adjustment   per patient size (includes targeted exams where dose is matched to clinical   indication); or iterative reconstruction.    COMPARISON:    No relevant prior studies available.    FINDINGS:    Brain:  Hyperdense acute hemorrhage is identified within the left basal   ganglia, with a zone of mild hypodense surrounding edema. This area of   hemorrhage measures 3.0 x 1.6 x 3.0 cm. This  hemorrhage/edema involves the   lentiform nuclei and posterior limb of the left internal capsule. Differential   considerations include hemorrhagic conversion of infarct and hypertensive   bleed, although additional hemorrhagic pathology cannot be excluded.  The   white-gray differentiation is otherwise preserved.    Ventricles:  There is mild asymmetry of the lateral ventricles, without   midline shift to the right.    Bones/joints:  The calvarium demonstrates no evidence for a depressed   fracture.    Soft tissues:  No acute abnormality.    Sinuses:  Unremarkable as visualized.  No acute sinusitis.    Mastoid air cells:  No mastoid effusion.      Impression:       1.  Hyperdense acute hemorrhage is identified within the left basal ganglia,   with a zone of mild hypodense surrounding edema. Differential considerations   include hemorrhagic conversion of infarct and hypertensive bleed, although   additional hemorrhagic pathology cannot be excluded. A follow-up CT in 12-24   hours is recommended.  2.  Alberta Stroke Program Early CT Score (ASPECTS) = 8    THIS DOCUMENT HAS BEEN ELECTRONICALLY SIGNED BY SEPIDEH STOLL MD              Discharge Details        Discharge Medications      New Medications      Instructions Start Date   hydrALAZINE 50 MG tablet  Commonly known as:  APRESOLINE   50 mg, Oral, Every 8 Hours Scheduled      lisinopril 30 MG tablet  Commonly known as:  PRINIVIL,ZESTRIL   30 mg, Oral, Every 12 Hours Scheduled      polyethylene glycol pack packet  Commonly known as:  MIRALAX   17 g, Oral, Daily      sennosides-docusate sodium 8.6-50 MG tablet  Commonly known as:  SENOKOT-S   2 tablets, Oral, 2 Times Daily         Stop These Medications    naproxen sodium 220 MG tablet  Commonly known as:  ALEVE              Discharge Disposition:  Rehab Facility or Unit (DC - External)    Discharge Diet:  Diet Instructions     Diet: Regular, Cardiac; Thin       Discharge Diet:   Regular  Cardiac       Fluid  Consistency:  Thin          Discharge Activity:   Activity Instructions     Activity as Tolerated               Code Status/Level of Support:  Code Status and Medical Interventions:   Ordered at: 08/04/18 2218     Code Status:    CPR     Medical Interventions (Level of Support Prior to Arrest):    Full       Future Appointments  Date Time Provider Department Center   9/4/2018 1:15 PM Raoul Anthony DO [facility] None       Additional Instructions for the Follow-ups that You Need to Schedule     Discharge Follow-up with PCP    As directed      Currently Documented PCP:  Provider, No Known  PCP Phone Number:  None    Follow Up Details:  after dc from [facility] as scheduled in [facility] on 9.4.18               Time Spent on Discharge:  35 minutes    Electronically signed by JOSE Gregorio, 08/09/18, 9:31 AM.

## 2018-08-09 NOTE — NURSING NOTE
Called report to MAKAYLA Shea at [facility].  Gave number to Monse for return call if they have questions.

## 2018-08-10 NOTE — THERAPY DISCHARGE NOTE
Acute Care - Physical Therapy Discharge Summary  [facility]       Patient Name: Valente Arndt Jr.  : 1970  MRN: [id number]    Today's Date: 8/10/2018  Onset of Illness/Injury or Date of Surgery: 18    Date of Referral to PT: 18  Referring Physician: MD Hughes      Admit Date: 2018      PT Recommendation and Plan    Visit Dx:    ICD-10-CM ICD-9-CM   1. Nontraumatic hemorrhage of left cerebral hemisphere (CMS/HCC) I61.2 431   2. Severe uncontrolled hypertension I10 401.9   3. Weakness R53.1 780.79   4. Dysarthria R47.1 784.51   5. Impaired mobility and ADLs Z74.09 799.89   6. Impaired functional mobility, balance, gait, and endurance Z74.09 V49.89             Outcome Measures     Row Name 08/10/18 1609 18 1410          How much help from another is currently needed...    Putting on and taking off regular lower body clothing?  -- 2  -AN     Bathing (including washing, rinsing, and drying)  -- 2  -AN     Toileting (which includes using toilet bed pan or urinal)  -- 2  -AN     Putting on and taking off regular upper body clothing  -- 2  -AN     Taking care of personal grooming (such as brushing teeth)  -- 3  -AN     Eating meals  -- 3  -AN     Score  -- 14  -AN        Modified Michael Scale    Modified Pierpont Scale 4 - Moderately severe disability.  Unable to walk without assistance, and unable to attend to own bodily needs without assistance.  - 3 - Moderate disability.  Requiring some help, but able to walk without assistance.  -AN       User Key  (r) = Recorded By, (t) = Taken By, (c) = Cosigned By    Initials Name Provider Type    AN Stacey Garcia, OT Occupational Therapist    Michelle Sin, PT Physical Therapist            Therapy Suggested Charges     Code   Minutes Charges    92533 (CPT®) Hc Pt Neuromusc Re Education Ea 15 Min      18377 (CPT®) Hc Pt Ther Proc Ea 15 Min 10 1    69977 (CPT®) Hc Gait Training Ea 15 Min 15 1    20702 (CPT®) Hc Pt Therapeutic Act Ea 15 Min       78106 (CPT®) Hc Pt Manual Therapy Ea 15 Min      50823 (CPT®) Hc Pt Iontophoresis Ea 15 Min      79574 (CPT®) Hc Pt Elec Stim Ea-Per 15 Min      95174 (CPT®) Hc Pt Ultrasound Ea 15 Min      84129 (CPT®) Hc Pt Self Care/Mgmt/Train Ea 15 Min      Total  25 2                PT Rehab Goals     Row Name 08/10/18 1609             Bed Mobility Goal 1 (PT)    Activity/Assistive Device (Bed Mobility Goal 1, PT) sit to supine/supine to sit  -MC      [place] Level/Cues Needed (Bed Mobility Goal 1, PT) independent  -MC      Time Frame (Bed Mobility Goal 1, PT) long term goal (LTG);10 days  -MC      Progress/Outcomes (Bed Mobility Goal 1, PT) goal ongoing  -MC         Transfer Goal 1 (PT)    Activity/Assistive Device (Transfer Goal 1, PT) sit-to-stand/stand-to-sit  -MC      [place] Level/Cues Needed (Transfer Goal 1, PT) independent  -MC      Time Frame (Transfer Goal 1, PT) long term goal (LTG);10 days  -MC      Progress/Outcome (Transfer Goal 1, PT) goal ongoing  -MC         Gait Training Goal 1 (PT)    Activity/Assistive Device (Gait Training Goal 1, PT) gait (walking locomotion)  -MC      [place] Level (Gait Training Goal 1, PT) contact guard assist  -MC      Distance (Gait Goal 1, PT) 350  -MC      Time Frame (Gait Training Goal 1, PT) long term goal (LTG);10 days  -MC      Progress/Outcome (Gait Training Goal 1, PT) goal ongoing  -MC         Patient Education Goal (PT)    Activity (Patient Education Goal, PT) HEP  -MC      [place]/Cues/Accuracy (Memory Goal 2, PT) verbalizes understanding  -MC      Time Frame (Patient Education Goal, PT) long term goal (LTG);10 days  -MC      Progress/Outcome (Patient Education Goal, PT) goal ongoing  -MC        User Key  (r) = Recorded By, (t) = Taken By, (c) = Cosigned By    Initials Name Provider Type Discipline    Michelle Sin, PT Physical Therapist PT              PT Discharge Summary  Anticipated Discharge Disposition (PT): inpatient rehabilitation  facility  Reason for Discharge: Discharge from facility  Outcomes Achieved: Refer to plan of care for updates on goals achieved  Discharge Destination: Inpatient rehabilitation facility      Michelle Dalal, PT   8/10/2018

## 2018-09-04 ENCOUNTER — OFFICE VISIT (OUTPATIENT)
Dept: FAMILY MEDICINE CLINIC | Facility: CLINIC | Age: 48
End: 2018-09-04

## 2018-09-04 VITALS
WEIGHT: 292 LBS | HEIGHT: 65 IN | HEART RATE: 81 BPM | BODY MASS INDEX: 48.65 KG/M2 | OXYGEN SATURATION: 96 % | DIASTOLIC BLOOD PRESSURE: 86 MMHG | TEMPERATURE: 98.5 F | SYSTOLIC BLOOD PRESSURE: 142 MMHG

## 2018-09-04 DIAGNOSIS — I61.0 NONTRAUMATIC SUBCORTICAL HEMORRHAGE OF LEFT CEREBRAL HEMISPHERE (HCC): Primary | ICD-10-CM

## 2018-09-04 DIAGNOSIS — IMO0001 CLASS 3 OBESITY DUE TO EXCESS CALORIES WITH SERIOUS COMORBIDITY AND BODY MASS INDEX (BMI) OF 45.0 TO 49.9 IN ADULT: ICD-10-CM

## 2018-09-04 DIAGNOSIS — I10 ESSENTIAL HYPERTENSION: ICD-10-CM

## 2018-09-04 PROCEDURE — 99204 OFFICE O/P NEW MOD 45 MIN: CPT | Performed by: FAMILY MEDICINE

## 2018-09-04 RX ORDER — HYDRALAZINE HYDROCHLORIDE 50 MG/1
50 TABLET, FILM COATED ORAL EVERY 8 HOURS SCHEDULED
Qty: 90 TABLET | Refills: 2 | Status: SHIPPED | OUTPATIENT
Start: 2018-09-04 | End: 2018-12-06 | Stop reason: SDUPTHER

## 2018-09-04 RX ORDER — LISINOPRIL 30 MG/1
30 TABLET ORAL EVERY 12 HOURS SCHEDULED
Qty: 60 TABLET | Refills: 2
Start: 2018-09-04 | End: 2018-09-06 | Stop reason: SDUPTHER

## 2018-09-04 NOTE — PROGRESS NOTES
"Subjective   Valente Arndt Jr. is a 48 y.o. male.     Chief Complaint   Patient presents with   • Establish Care     No previous PCP   • Cerebrovascular Accident     8/4/18   was in [facility]       History of Present Illness     Previous primary care: None    Patient presents today for hospital follow-up.  He was hospitalized from 8/4/18-8/9/18 at [facility] following a cerebrovascular accident/intracranial hemorrhage.  Hospital summary is below:    Hospital Course:  Valente Arndt Jr. is a 48 y.o. male who presented to the ED with sudden onset of right sided weakness and a feeling of \"drunkeness\".  Imaging revealed left sided basal ganglia hemorrhage.  His systolic bp was in the 170's on EMS arrival.  He was started on a cardene gtt and admitted to the ICU for observation.  Neurosurgery was consulted and there was no need for intervention other than blood pressure control.  Repeat imaging showed no change in the appearance of his ICH.  He has continued to show improvement and has been transitioned to oral meds with improved control.  He is felt to be stable and ready for dc to [facility] for continued rehab.    Please note his elevated WBC count since admission.  CXR showed no infiltrate, he denies cough or urinary complaints, and he has remained afebrile.  It is felt this is reactive and should be repeated in a few days.      Acute complaints:  Since discharge he reports doing well.  He has been compliant on his lisinopril and hydralazine. He was DCed from  on 8/23, continues outpt PT/OT on Tuesdays and Thursdays.    The following portions of the patient's history were reviewed and updated as appropriate: allergies, current medications, past family history, past medical history, past social history, past surgical history and problem list.    Active Ambulatory Problems     Diagnosis Date Noted   • ICH (intracerebral hemorrhage) (CMS/[facility]) 08/04/2018   • Essential hypertension 08/05/2018   • H/O " noncompliance with medical treatment, presenting hazards to health 08/05/2018   • Class 3 obesity 08/05/2018   • Other constipation 08/08/2018     Resolved Ambulatory Problems     Diagnosis Date Noted   • No Resolved Ambulatory Problems     Past Medical History:   Diagnosis Date   • Hypertension    • Stroke (CMS/HCC)      Past Surgical History:   Procedure Laterality Date   • APPENDECTOMY     • FOOT SURGERY     • WISDOM TOOTH EXTRACTION       Family History   Problem Relation Age of Onset   • Obesity Mother    • Hypertension Father    • Kidney disease Father    • Heart murmur Father    • No Known Problems Daughter    • No Known Problems Daughter      Social History     Social History   • Marital status: Single     Spouse name: N/A   • Number of children: N/A   • Years of education: N/A     Occupational History   • Not on file.     Social History Main Topics   • Smoking status: Never Smoker   • Smokeless tobacco: Never Used   • Alcohol use Yes      Comment: rare occasion   • Drug use: No   • Sexual activity: No     Other Topics Concern   • Not on file     Social History Narrative   • No narrative on file         Review of Systems   HENT: Negative.    Eyes: Negative.    Respiratory: Negative for cough, shortness of breath and wheezing.    Cardiovascular: Negative for chest pain and palpitations.   Gastrointestinal: Negative.    Endocrine: Negative for cold intolerance and heat intolerance.   Genitourinary: Negative.  Negative for difficulty urinating, dysuria and frequency.   Musculoskeletal: Negative.  Negative for joint swelling and neck stiffness.   Skin: Negative.  Negative for color change and wound.   Neurological: Positive for weakness. Negative for dizziness, tremors, seizures, light-headedness, numbness and headaches.   Psychiatric/Behavioral: Positive for sleep disturbance. Negative for agitation and confusion.       Objective   Blood pressure 142/86, pulse 81, temperature 98.5 °F (36.9 °C), temperature  "source Temporal Artery , height 165.1 cm (65\"), weight 132 kg (292 lb), SpO2 96 %.  Nursing note reviewed  Physical Exam  Const: NAD, A&Ox4, Pleasant, Cooperative  Eyes: EOMI, no conjunctivitis  ENT: No nasal discharge present, neck supple  Cardiac: Regular rate and rhythm, no peripheral edema or cyanosis  Resp: Respiratory rate within normal limits, no increased work of breathing, no audible wheezing or retractions noted  GI: No distention or ascites  MSK: Motor and sensation grossly intact in bilateral upper extremities. 4/5 LUE  Neurologic, CN II-XII grossly intact  Psych: Appropriate mood and behavior.  Skin: Pink, warm, dry  Procedures  Assessment/Plan   Valente was seen today for establish care and cerebrovascular accident.    Diagnoses and all orders for this visit:    Nontraumatic subcortical hemorrhage of left cerebral hemisphere (CMS/HCC)  -     Discontinue: lisinopril (PRINIVIL,ZESTRIL) 30 MG tablet; Take 1 tablet by mouth Every 12 (Twelve) Hours.  -     hydrALAZINE (APRESOLINE) 50 MG tablet; Take 1 tablet by mouth Every 8 (Eight) Hours.    Essential hypertension  -     Discontinue: lisinopril (PRINIVIL,ZESTRIL) 30 MG tablet; Take 1 tablet by mouth Every 12 (Twelve) Hours.  -     hydrALAZINE (APRESOLINE) 50 MG tablet; Take 1 tablet by mouth Every 8 (Eight) Hours.    Class 3 obesity  -     Discontinue: lisinopril (PRINIVIL,ZESTRIL) 30 MG tablet; Take 1 tablet by mouth Every 12 (Twelve) Hours.  -     hydrALAZINE (APRESOLINE) 50 MG tablet; Take 1 tablet by mouth Every 8 (Eight) Hours.      We will continue his blood pressure medications for now. He is doing well post- CVA, should continue rehab as above.    Continue hydralazine plus lisinopril. He will need to lose weight, he has been doing much better on this since his discharge. He is down 8lbs since 1 months ago.    We will plan to obtain previous records both for chronic preventative care as well as those related to the current episode of care.  Any " records that the patient brought with him today were reviewed personally by me during the visit today and will be scanned into the chart for posterity.    There are no Patient Instructions on file for this visit.    Ambulatory progress note signed and attested to by Raoul Anthony D.O. on 9/16/2018 at 13:28.

## 2018-09-06 DIAGNOSIS — I61.0 NONTRAUMATIC SUBCORTICAL HEMORRHAGE OF LEFT CEREBRAL HEMISPHERE (HCC): ICD-10-CM

## 2018-09-06 DIAGNOSIS — IMO0001 CLASS 3 OBESITY DUE TO EXCESS CALORIES WITH SERIOUS COMORBIDITY AND BODY MASS INDEX (BMI) OF 45.0 TO 49.9 IN ADULT: ICD-10-CM

## 2018-09-06 DIAGNOSIS — I10 ESSENTIAL HYPERTENSION: ICD-10-CM

## 2018-09-10 RX ORDER — LISINOPRIL 30 MG/1
30 TABLET ORAL EVERY 12 HOURS SCHEDULED
Qty: 180 TABLET | Refills: 1 | Status: SHIPPED | OUTPATIENT
Start: 2018-09-10 | End: 2019-03-06 | Stop reason: SDUPTHER

## 2018-09-10 NOTE — TELEPHONE ENCOUNTER
PER PT WOULD LIKE A REFILL OF:    LISINOPRIL 30 MG 1 EVERY 12 HOURS     [facility] [place]    PT IS COMPLETLEY OUT.

## 2018-10-08 ENCOUNTER — OFFICE VISIT (OUTPATIENT)
Dept: FAMILY MEDICINE CLINIC | Facility: CLINIC | Age: 48
End: 2018-10-08

## 2018-10-08 VITALS
DIASTOLIC BLOOD PRESSURE: 88 MMHG | SYSTOLIC BLOOD PRESSURE: 148 MMHG | OXYGEN SATURATION: 97 % | HEART RATE: 79 BPM | WEIGHT: 286 LBS | HEIGHT: 65 IN | BODY MASS INDEX: 47.65 KG/M2

## 2018-10-08 DIAGNOSIS — I10 ESSENTIAL HYPERTENSION: ICD-10-CM

## 2018-10-08 DIAGNOSIS — E78.00 PURE HYPERCHOLESTEROLEMIA: ICD-10-CM

## 2018-10-08 DIAGNOSIS — I61.0 NONTRAUMATIC SUBCORTICAL HEMORRHAGE OF LEFT CEREBRAL HEMISPHERE (HCC): Primary | ICD-10-CM

## 2018-10-08 PROCEDURE — 99214 OFFICE O/P EST MOD 30 MIN: CPT | Performed by: FAMILY MEDICINE

## 2018-10-08 NOTE — PATIENT INSTRUCTIONS
1. Continue walking 2+ miles per day.    2. Come back in the next week to have fasting blood work (nothing by mouth with calories for 8 hours).    3. Ok to return to work without restrictions.    4. Return to  paperwork before Saturday.

## 2018-10-08 NOTE — PROGRESS NOTES
Subjective   Valente Arndt Jr. is a 48 y.o. male.     Chief Complaint   Patient presents with   • Follow-up       History of Present Illness     Previous primary care: None    Patient presents today for follow-up on hypertension and obesity.  He was hospitalized from 8/4/18-8/9/18 at [facility] following a cerebrovascular accident/intracranial hemorrhage.  He reports doing well since his discharge.  He has been able to lose 14 pounds since that time.  He has been dedicated to dietary changes and light exercise as tolerated.    The following portions of the patient's history were reviewed and updated as appropriate: allergies, current medications, past family history, past medical history, past social history, past surgical history and problem list.    Active Ambulatory Problems     Diagnosis Date Noted   • ICH (intracerebral hemorrhage) (CMS/HCC) 08/04/2018   • Essential hypertension 08/05/2018   • H/O noncompliance with medical treatment, presenting hazards to health 08/05/2018   • Class 3 obesity 08/05/2018   • Other constipation 08/08/2018   • Pure hypercholesterolemia 10/08/2018     Resolved Ambulatory Problems     Diagnosis Date Noted   • No Resolved Ambulatory Problems     Past Medical History:   Diagnosis Date   • Hypertension    • Stroke (CMS/HCC)      Past Surgical History:   Procedure Laterality Date   • APPENDECTOMY     • FOOT SURGERY     • WISDOM TOOTH EXTRACTION       Family History   Problem Relation Age of Onset   • Obesity Mother    • Hypertension Father    • Kidney disease Father    • Heart murmur Father    • No Known Problems Daughter    • No Known Problems Daughter      Social History     Social History   • Marital status: Single     Spouse name: N/A   • Number of children: N/A   • Years of education: N/A     Occupational History   • Not on file.     Social History Main Topics   • Smoking status: Never Smoker   • Smokeless tobacco: Never Used   • Alcohol use Yes      Comment:  "rare occasion   • Drug use: No   • Sexual activity: No     Other Topics Concern   • Not on file     Social History Narrative   • No narrative on file         Review of Systems   HENT: Negative.    Eyes: Negative.    Respiratory: Negative for cough, shortness of breath and wheezing.    Cardiovascular: Negative for chest pain and palpitations.   Gastrointestinal: Negative.    Endocrine: Negative for cold intolerance and heat intolerance.   Genitourinary: Negative.  Negative for difficulty urinating, dysuria and frequency.   Musculoskeletal: Negative.  Negative for joint swelling and neck stiffness.   Skin: Negative.  Negative for color change and wound.   Neurological: Positive for weakness. Negative for dizziness, tremors, seizures, light-headedness, numbness and headaches.   Psychiatric/Behavioral: Positive for sleep disturbance. Negative for agitation and confusion.       Objective   Blood pressure 148/88, pulse 79, height 165.1 cm (65\"), weight 130 kg (286 lb), SpO2 97 %.  Nursing note reviewed  Physical Exam  Const: NAD, A&Ox4, Pleasant, Cooperative  Eyes: EOMI, no conjunctivitis  ENT: No nasal discharge present, neck supple  Cardiac: Regular rate and rhythm, no peripheral edema or cyanosis  Resp: Respiratory rate within normal limits, no increased work of breathing, no audible wheezing or retractions noted  GI: No distention or ascites  MSK: Motor and sensation grossly intact in bilateral upper extremities. 4/5 LUE  Neurologic, CN II-XII grossly intact  Psych: Appropriate mood and behavior.  Skin: Pink, warm, dry  Procedures  Assessment/Plan   Valente was seen today for follow-up.    Diagnoses and all orders for this visit:    Nontraumatic subcortical hemorrhage of left cerebral hemisphere (CMS/HCC)    Essential hypertension    Pure hypercholesterolemia  -     Lipid panel; Future  -     Comprehensive metabolic panel; Future      #1 history of CVA  Thought to be secondary to uncontrolled hypertension.  He is " improved and should have no significant lasting deficits.  He is okay to return to work.    #2 hypertension  He should continue the hydralazine and lisinopril    #3 obesity  He is doing very well.  He has been able to lose 14 pounds since his discharge.  He should continue walking 2 miles per day as he has been doing.    #4 hyperlipidemia  Lab Results   Component Value Date    CHOL 180 10/11/2018    TRIG 114 10/11/2018    HDL 35 (L) 10/11/2018     (H) 10/11/2018   Given his history he should be on a statin regardless, however he would like to try to get his LDL below 100 through diet and exercise.  His benefit from a statin is significant.    Patient Instructions   1. Continue walking 2+ miles per day.    2. Come back in the next week to have fasting blood work (nothing by mouth with calories for 8 hours).    3. Ok to return to work without restrictions.    4. Return to  paperwork before Saturday.      Ambulatory progress note signed and attested to by Raoul Anthony D.O. on 10/8/2018 at 14:16.

## 2018-10-11 ENCOUNTER — LAB (OUTPATIENT)
Dept: LAB | Facility: HOSPITAL | Age: 48
End: 2018-10-11

## 2018-10-11 DIAGNOSIS — E78.00 PURE HYPERCHOLESTEROLEMIA: ICD-10-CM

## 2018-10-11 LAB
ALBUMIN SERPL-MCNC: 4.26 G/DL (ref 3.2–4.8)
ALBUMIN/GLOB SERPL: 1.8 G/DL (ref 1.5–2.5)
ALP SERPL-CCNC: 86 U/L (ref 25–100)
ALT SERPL W P-5'-P-CCNC: 34 U/L (ref 7–40)
ANION GAP SERPL CALCULATED.3IONS-SCNC: 6 MMOL/L (ref 3–11)
ARTICHOKE IGE QN: 141 MG/DL (ref 0–130)
AST SERPL-CCNC: 23 U/L (ref 0–33)
BILIRUB SERPL-MCNC: 0.5 MG/DL (ref 0.3–1.2)
BUN BLD-MCNC: 14 MG/DL (ref 9–23)
BUN/CREAT SERPL: 13.6 (ref 7–25)
CALCIUM SPEC-SCNC: 10 MG/DL (ref 8.7–10.4)
CHLORIDE SERPL-SCNC: 103 MMOL/L (ref 99–109)
CHOLEST SERPL-MCNC: 180 MG/DL (ref 0–200)
CO2 SERPL-SCNC: 29 MMOL/L (ref 20–31)
CREAT BLD-MCNC: 1.03 MG/DL (ref 0.6–1.3)
GFR SERPL CREATININE-BSD FRML MDRD: 77 ML/MIN/1.73
GLOBULIN UR ELPH-MCNC: 2.3 GM/DL
GLUCOSE BLD-MCNC: 97 MG/DL (ref 70–100)
HDLC SERPL-MCNC: 35 MG/DL (ref 40–60)
POTASSIUM BLD-SCNC: 4.3 MMOL/L (ref 3.5–5.5)
PROT SERPL-MCNC: 6.6 G/DL (ref 5.7–8.2)
SODIUM BLD-SCNC: 138 MMOL/L (ref 132–146)
TRIGL SERPL-MCNC: 114 MG/DL (ref 0–150)

## 2018-10-11 PROCEDURE — 36415 COLL VENOUS BLD VENIPUNCTURE: CPT

## 2018-10-11 PROCEDURE — 80053 COMPREHEN METABOLIC PANEL: CPT | Performed by: FAMILY MEDICINE

## 2018-10-11 PROCEDURE — 80061 LIPID PANEL: CPT | Performed by: FAMILY MEDICINE

## 2018-11-13 ENCOUNTER — OFFICE VISIT (OUTPATIENT)
Dept: FAMILY MEDICINE CLINIC | Facility: CLINIC | Age: 48
End: 2018-11-13

## 2018-11-13 VITALS
HEART RATE: 74 BPM | BODY MASS INDEX: 45.65 KG/M2 | HEIGHT: 65 IN | DIASTOLIC BLOOD PRESSURE: 84 MMHG | WEIGHT: 274 LBS | OXYGEN SATURATION: 98 % | SYSTOLIC BLOOD PRESSURE: 134 MMHG

## 2018-11-13 DIAGNOSIS — E66.01 CLASS 3 SEVERE OBESITY DUE TO EXCESS CALORIES WITH SERIOUS COMORBIDITY AND BODY MASS INDEX (BMI) OF 45.0 TO 49.9 IN ADULT (HCC): ICD-10-CM

## 2018-11-13 DIAGNOSIS — E78.00 PURE HYPERCHOLESTEROLEMIA: ICD-10-CM

## 2018-11-13 DIAGNOSIS — I10 ESSENTIAL HYPERTENSION: Primary | ICD-10-CM

## 2018-11-13 PROBLEM — E66.813 CLASS 3 SEVERE OBESITY DUE TO EXCESS CALORIES WITH SERIOUS COMORBIDITY AND BODY MASS INDEX (BMI) OF 45.0 TO 49.9 IN ADULT: Status: ACTIVE | Noted: 2018-08-05

## 2018-11-13 PROCEDURE — 99214 OFFICE O/P EST MOD 30 MIN: CPT | Performed by: FAMILY MEDICINE

## 2018-11-13 NOTE — PATIENT INSTRUCTIONS
1. Continue walking 2+ miles per day.    2. Continue calorie restriction 9355-5565 calories per day.

## 2018-11-13 NOTE — PROGRESS NOTES
Subjective   Valente Arndt Jr. is a 48 y.o. male.     Chief Complaint   Patient presents with   • Follow-up       History of Present Illness     Patient presents today for follow-up on hypertension and obesity.  He was hospitalized from 8/4/18-8/9/18 at [facility] following a cerebrovascular accident/intracranial hemorrhage.  Since August, he has been able to lose 45 pounds from his peak weight of 320 pounds.  He has lost 12 pounds since his last visit in October.  His blood pressure has had increasing the improved control as he has increased exercise and lost weight.  His cholesterol is improved slightly, but is still maintained above would be a goal based on his CVA history.    The following portions of the patient's history were reviewed and updated as appropriate: allergies, current medications, past family history, past medical history, past social history, past surgical history and problem list.    Active Ambulatory Problems     Diagnosis Date Noted   • ICH (intracerebral hemorrhage) (CMS/[facility]) 08/04/2018   • Essential hypertension 08/05/2018   • H/O noncompliance with medical treatment, presenting hazards to health 08/05/2018   • Class 3 severe obesity due to excess calories with serious comorbidity and body mass index (BMI) of 45.0 to 49.9 in adult (CMS/[facility]) 08/05/2018   • Other constipation 08/08/2018   • Pure hypercholesterolemia 10/08/2018     Resolved Ambulatory Problems     Diagnosis Date Noted   • No Resolved Ambulatory Problems     Past Medical History:   Diagnosis Date   • Hypertension    • Stroke (CMS/[facility])      Past Surgical History:   Procedure Laterality Date   • APPENDECTOMY     • FOOT SURGERY     • WISDOM TOOTH EXTRACTION       Family History   Problem Relation Age of Onset   • Obesity Mother    • Hypertension Father    • Kidney disease Father    • Heart murmur Father    • No Known Problems Daughter    • No Known Problems Daughter      Social History     Socioeconomic History  "  • Marital status: Single     Spouse name: Not on file   • Number of children: Not on file   • Years of education: Not on file   • Highest education level: Not on file   Social Needs   • Financial resource strain: Not on file   • Food insecurity - worry: Not on file   • Food insecurity - inability: Not on file   • Transportation needs - medical: Not on file   • Transportation needs - non-medical: Not on file   Occupational History   • Not on file   Tobacco Use   • Smoking status: Never Smoker   • Smokeless tobacco: Never Used   Substance and Sexual Activity   • Alcohol use: Yes     Comment: rare occasion   • Drug use: No   • Sexual activity: No     Partners: Female   Other Topics Concern   • Not on file   Social History Narrative   • Not on file         Review of Systems   Constitutional:        Continued purposeful weight loss   HENT: Negative.    Eyes: Negative.    Respiratory: Negative for cough, shortness of breath and wheezing.    Cardiovascular: Negative for chest pain and palpitations.   Gastrointestinal: Negative.    Endocrine: Negative for cold intolerance and heat intolerance.   Genitourinary: Negative.  Negative for difficulty urinating, dysuria and frequency.   Musculoskeletal: Negative.  Negative for joint swelling and neck stiffness.   Skin: Negative.  Negative for color change and wound.   Neurological: Negative for dizziness, tremors, seizures, light-headedness, numbness and headaches.   Psychiatric/Behavioral: Negative for agitation and confusion.       Objective   Blood pressure 134/84, pulse 74, height 165.1 cm (65\"), weight 124 kg (274 lb), SpO2 98 %.  Nursing note reviewed  Physical Exam  Const: NAD, A&Ox4, Pleasant, Cooperative- severe obesity   Eyes: EOMI, no conjunctivitis  ENT: No nasal discharge present, neck supple; mucous membranes moist, dentition adequate  Cardiac: Regular rate and rhythm, no peripheral edema or cyanosis  Resp: Respiratory rate within normal limits, no increased work " of breathing, no audible wheezing or retractions noted  GI: No distention or ascites  MSK: Motor and sensation grossly intact in bilateral upper extremities.  Neurologic, CN II-XII grossly intact  Psych: Appropriate mood and behavior.  Skin: Pink, warm, dry  Procedures  Assessment/Plan   Valente was seen today for follow-up.    Diagnoses and all orders for this visit:    Essential hypertension    Pure hypercholesterolemia    Class 3 severe obesity due to excess calories with serious comorbidity and body mass index (BMI) of 45.0 to 49.9 in adult (CMS/[facility])      #1 history of CVA  Thought to be secondary to uncontrolled hypertension.  He is improved and should have no significant lasting deficits.  · He should continue medications for hypertension  · His already significant weight loss should continue to be beneficial.  He is at goal, 134/84 today    #2 hypertension  · He should continue the hydralazine and lisinopril  · His already significant weight loss should continue to be beneficial.  He is at goal, 134/84 today    #3 obesity  He is doing very well.  He has been able to lose 45 pounds in the last 3-1/2 months.  He does not count his calories daily, but has cut out soda, second portions, and empty calories.  He should continue walking 2 miles per day as he has been doing.  · We discussed the fact that he will require continuously lower calorie intake level versus weight declines.  Winter will also be a difficult time for outdoor walking, I recommended that he join a low-cost gym for at least elliptical and treadmill training    #4 hyperlipidemia  Lab Results   Component Value Date    CHOL 180 10/11/2018    TRIG 114 10/11/2018    HDL 35 (L) 10/11/2018     (H) 10/11/2018   Given his history he should be on a statin regardless, however he would like to try to get his LDL below 100 through diet and exercise.  His benefit from a statin is significant.    Patient Instructions   1. Continue walking 2+ miles per  day.    2. Continue calorie restriction 6335-3079 calories per day.      Ambulatory progress note signed and attested to by Raoul Anthony D.O. on 11/13/2018 at 11:54.

## 2018-12-06 ENCOUNTER — TELEPHONE (OUTPATIENT)
Dept: FAMILY MEDICINE CLINIC | Facility: CLINIC | Age: 48
End: 2018-12-06

## 2018-12-06 DIAGNOSIS — I10 ESSENTIAL HYPERTENSION: ICD-10-CM

## 2018-12-06 DIAGNOSIS — E66.01 CLASS 3 SEVERE OBESITY DUE TO EXCESS CALORIES WITH SERIOUS COMORBIDITY AND BODY MASS INDEX (BMI) OF 45.0 TO 49.9 IN ADULT (HCC): Primary | ICD-10-CM

## 2018-12-06 DIAGNOSIS — I61.0 NONTRAUMATIC SUBCORTICAL HEMORRHAGE OF LEFT CEREBRAL HEMISPHERE (HCC): ICD-10-CM

## 2018-12-06 PROBLEM — K59.09 OTHER CONSTIPATION: Status: RESOLVED | Noted: 2018-08-08 | Resolved: 2018-12-06

## 2018-12-06 PROBLEM — Z91.199 H/O NONCOMPLIANCE WITH MEDICAL TREATMENT, PRESENTING HAZARDS TO HEALTH: Status: RESOLVED | Noted: 2018-08-05 | Resolved: 2018-12-06

## 2018-12-06 RX ORDER — HYDRALAZINE HYDROCHLORIDE 50 MG/1
50 TABLET, FILM COATED ORAL EVERY 8 HOURS SCHEDULED
Qty: 90 TABLET | Refills: 2 | Status: SHIPPED | OUTPATIENT
Start: 2018-12-06 | End: 2019-03-06 | Stop reason: SDUPTHER

## 2018-12-06 NOTE — TELEPHONE ENCOUNTER
PT CALLED AND SAID HE IS OUT OF HYDRALAZINE 50 MG TABLET AND WOULD LIKE A REFILL    PLEASE SEND TO [facility]

## 2019-01-14 ENCOUNTER — OFFICE VISIT (OUTPATIENT)
Dept: FAMILY MEDICINE CLINIC | Facility: CLINIC | Age: 49
End: 2019-01-14

## 2019-01-14 VITALS
WEIGHT: 264 LBS | OXYGEN SATURATION: 98 % | RESPIRATION RATE: 20 BRPM | TEMPERATURE: 98.7 F | BODY MASS INDEX: 43.93 KG/M2 | SYSTOLIC BLOOD PRESSURE: 120 MMHG | DIASTOLIC BLOOD PRESSURE: 72 MMHG | HEART RATE: 70 BPM

## 2019-01-14 DIAGNOSIS — E66.01 CLASS 3 SEVERE OBESITY DUE TO EXCESS CALORIES WITH SERIOUS COMORBIDITY AND BODY MASS INDEX (BMI) OF 40.0 TO 44.9 IN ADULT (HCC): Primary | ICD-10-CM

## 2019-01-14 DIAGNOSIS — E78.00 PURE HYPERCHOLESTEROLEMIA: ICD-10-CM

## 2019-01-14 DIAGNOSIS — I10 ESSENTIAL HYPERTENSION: ICD-10-CM

## 2019-01-14 PROCEDURE — 99214 OFFICE O/P EST MOD 30 MIN: CPT | Performed by: FAMILY MEDICINE

## 2019-01-14 NOTE — PROGRESS NOTES
Subjective   Valente Arndt Jr. is a 48 y.o. male.     Chief Complaint   Patient presents with   • Follow-up     weight loss       History of Present Illness     Patient presents today for follow-up on hypertension and obesity.  He was hospitalized from 8/4/18-8/9/18 at [facility] following a cerebrovascular accident/intracranial hemorrhage.  Since August, he has been able to lose 55 pounds from his peak weight of 320 pounds.  He has lost 9 pounds since his last visit in November.  His blood pressure has had increasingly improved control as he has increased exercise and lost weight, he is still taking lisinopril 30mg BID and hydralazine 50mg TID. He is attempting to bring his LDL below 100 with diet and exercise, most recently was at 141. He hasn't been able to walk as much with the weather, but has continued a low calorie diet.    The following portions of the patient's history were reviewed and updated as appropriate: allergies, current medications, past family history, past medical history, past social history, past surgical history and problem list.    Active Ambulatory Problems     Diagnosis Date Noted   • ICH (intracerebral hemorrhage) (CMS/[facility]) 08/04/2018   • Essential hypertension 08/05/2018   • Class 3 severe obesity due to excess calories with serious comorbidity and body mass index (BMI) of 45.0 to 49.9 in adult (CMS/[facility]) 08/05/2018   • Pure hypercholesterolemia 10/08/2018     Resolved Ambulatory Problems     Diagnosis Date Noted   • H/O noncompliance with medical treatment, presenting hazards to health 08/05/2018   • Other constipation 08/08/2018     Past Medical History:   Diagnosis Date   • Hypertension    • Stroke (CMS/[facility])      Past Surgical History:   Procedure Laterality Date   • APPENDECTOMY     • FOOT SURGERY     • WISDOM TOOTH EXTRACTION       Family History   Problem Relation Age of Onset   • Obesity Mother    • Hypertension Father    • Kidney disease Father    • Heart murmur  Father    • No Known Problems Daughter    • No Known Problems Daughter      Social History     Socioeconomic History   • Marital status: Single     Spouse name: Not on file   • Number of children: Not on file   • Years of education: Not on file   • Highest education level: Not on file   Social Needs   • Financial resource strain: Not on file   • Food insecurity - worry: Not on file   • Food insecurity - inability: Not on file   • Transportation needs - medical: Not on file   • Transportation needs - non-medical: Not on file   Occupational History   • Not on file   Tobacco Use   • Smoking status: Never Smoker   • Smokeless tobacco: Never Used   Substance and Sexual Activity   • Alcohol use: Yes     Comment: rare occasion   • Drug use: No   • Sexual activity: No     Partners: Female   Other Topics Concern   • Not on file   Social History Narrative   • Not on file         Review of Systems   Constitutional:        Continued purposeful weight loss   HENT: Negative.    Respiratory: Negative for cough, shortness of breath and wheezing.    Cardiovascular: Negative for chest pain and palpitations.       Objective   Blood pressure 120/72, pulse 70, temperature 98.7 °F (37.1 °C), temperature source Oral, resp. rate 20, weight 120 kg (264 lb), SpO2 98 %.  Nursing note reviewed  Physical Exam  Const: NAD, A&Ox4, Pleasant, Cooperative- severe obesity   Eyes: EOMI, no conjunctivitis  ENT: No nasal discharge present, neck supple; mucous membranes moist, dentition adequate  Cardiac: Regular rate and rhythm, no peripheral edema or cyanosis  Resp: Respiratory rate within normal limits, no increased work of breathing, no audible wheezing or retractions noted  GI: No distention or ascites  MSK: Motor and sensation grossly intact in bilateral upper extremities.  Neurologic, CN II-XII grossly intact  Psych: Appropriate mood and behavior.  Skin: Pink, warm, dry  Procedures  Assessment/Plan   Valente was seen today for  follow-up.    Diagnoses and all orders for this visit:    Class 3 severe obesity due to excess calories with serious comorbidity and body mass index (BMI) of 40.0 to 44.9 in adult (CMS/[facility])    Essential hypertension  -     Comprehensive Metabolic Panel; Future    Pure hypercholesterolemia  -     Lipid Panel; Future  -     Comprehensive Metabolic Panel; Future      #1 history of CVA  Thought to be secondary to uncontrolled hypertension.  He is improved and should have no significant lasting deficits.  · He should continue medications for hypertension  · His already significant weight loss should continue to be beneficial.  He is at goal, 120/72 today    #2 hypertension  · He should continue the hydralazine and lisinopril  · His already significant weight loss should continue to be beneficial.  He is at goal, 120/72 today    #3 obesity  He is doing very well.  He has been able to lose 55 pounds in the last 5-1/2 months.  He does not count his calories daily, but has cut out soda, second portions, and empty calories.  He should continue walking 2 miles per day as he has been doing.  · We discussed the fact that he will require continuously lower calorie intake level versus weight declines.  Winter will also continue to be a difficult time for outdoor walking, I recommended again that he join a low-cost gym for at least elliptical and treadmill training    #4 hyperlipidemia  Lab Results   Component Value Date    CHOL 180 10/11/2018    TRIG 114 10/11/2018    HDL 35 (L) 10/11/2018     (H) 10/11/2018   Given his history he should be on a statin regardless, however he would like to try to get his LDL below 100 through diet and exercise.  His benefit from a statin is significant.    Patient Instructions   1. Continue walking 2+ miles per day.    2. Continue calorie restriction 0505-0232 calories per day.    3.  Labs today. A letter will be sent with your results.      Ambulatory progress note signed and attested to by  Raoul Anthony D.O. on 1/14/2019 at 10:37.

## 2019-01-14 NOTE — PATIENT INSTRUCTIONS
1. Continue walking 2+ miles per day.    2. Continue calorie restriction 8781-6922 calories per day.    3.  Labs today. A letter will be sent with your results.

## 2019-01-16 ENCOUNTER — LAB (OUTPATIENT)
Dept: LAB | Facility: HOSPITAL | Age: 49
End: 2019-01-16

## 2019-01-16 DIAGNOSIS — I10 ESSENTIAL HYPERTENSION: ICD-10-CM

## 2019-01-16 DIAGNOSIS — E78.00 PURE HYPERCHOLESTEROLEMIA: ICD-10-CM

## 2019-01-16 LAB
ALBUMIN SERPL-MCNC: 4.27 G/DL (ref 3.2–4.8)
ALBUMIN/GLOB SERPL: 1.8 G/DL (ref 1.5–2.5)
ALP SERPL-CCNC: 93 U/L (ref 25–100)
ALT SERPL W P-5'-P-CCNC: 22 U/L (ref 7–40)
ANION GAP SERPL CALCULATED.3IONS-SCNC: 1 MMOL/L (ref 3–11)
ARTICHOKE IGE QN: 165 MG/DL (ref 0–130)
AST SERPL-CCNC: 18 U/L (ref 0–33)
BILIRUB SERPL-MCNC: 0.5 MG/DL (ref 0.3–1.2)
BUN BLD-MCNC: 17 MG/DL (ref 9–23)
BUN/CREAT SERPL: 17.5 (ref 7–25)
CALCIUM SPEC-SCNC: 9.4 MG/DL (ref 8.7–10.4)
CHLORIDE SERPL-SCNC: 107 MMOL/L (ref 99–109)
CHOLEST SERPL-MCNC: 200 MG/DL (ref 0–200)
CO2 SERPL-SCNC: 32 MMOL/L (ref 20–31)
CREAT BLD-MCNC: 0.97 MG/DL (ref 0.6–1.3)
GFR SERPL CREATININE-BSD FRML MDRD: 83 ML/MIN/1.73
GLOBULIN UR ELPH-MCNC: 2.3 GM/DL
GLUCOSE BLD-MCNC: 94 MG/DL (ref 70–100)
HDLC SERPL-MCNC: 38 MG/DL (ref 40–60)
POTASSIUM BLD-SCNC: 4.3 MMOL/L (ref 3.5–5.5)
PROT SERPL-MCNC: 6.6 G/DL (ref 5.7–8.2)
SODIUM BLD-SCNC: 140 MMOL/L (ref 132–146)
TRIGL SERPL-MCNC: 84 MG/DL (ref 0–150)

## 2019-01-16 PROCEDURE — 80061 LIPID PANEL: CPT | Performed by: FAMILY MEDICINE

## 2019-01-16 PROCEDURE — 80053 COMPREHEN METABOLIC PANEL: CPT | Performed by: FAMILY MEDICINE

## 2019-03-06 DIAGNOSIS — E66.01 MORBID OBESITY (HCC): ICD-10-CM

## 2019-03-06 DIAGNOSIS — I10 ESSENTIAL HYPERTENSION: ICD-10-CM

## 2019-03-06 DIAGNOSIS — I61.0 NONTRAUMATIC SUBCORTICAL HEMORRHAGE OF LEFT CEREBRAL HEMISPHERE (HCC): ICD-10-CM

## 2019-03-06 DIAGNOSIS — E66.01 CLASS 3 SEVERE OBESITY DUE TO EXCESS CALORIES WITH SERIOUS COMORBIDITY AND BODY MASS INDEX (BMI) OF 45.0 TO 49.9 IN ADULT (HCC): ICD-10-CM

## 2019-03-06 RX ORDER — HYDRALAZINE HYDROCHLORIDE 50 MG/1
50 TABLET, FILM COATED ORAL EVERY 8 HOURS SCHEDULED
Qty: 90 TABLET | Refills: 2 | Status: SHIPPED | OUTPATIENT
Start: 2019-03-06 | End: 2019-04-17 | Stop reason: SDUPTHER

## 2019-03-06 RX ORDER — LISINOPRIL 30 MG/1
30 TABLET ORAL EVERY 12 HOURS SCHEDULED
Qty: 180 TABLET | Refills: 1 | Status: SHIPPED | OUTPATIENT
Start: 2019-03-06 | End: 2019-09-07 | Stop reason: SDUPTHER

## 2019-04-17 DIAGNOSIS — I10 ESSENTIAL HYPERTENSION: ICD-10-CM

## 2019-04-17 DIAGNOSIS — E66.01 CLASS 3 SEVERE OBESITY DUE TO EXCESS CALORIES WITH SERIOUS COMORBIDITY AND BODY MASS INDEX (BMI) OF 45.0 TO 49.9 IN ADULT (HCC): ICD-10-CM

## 2019-04-17 DIAGNOSIS — I61.0 NONTRAUMATIC SUBCORTICAL HEMORRHAGE OF LEFT CEREBRAL HEMISPHERE (HCC): ICD-10-CM

## 2019-04-18 RX ORDER — HYDRALAZINE HYDROCHLORIDE 50 MG/1
50 TABLET, FILM COATED ORAL EVERY 8 HOURS SCHEDULED
Qty: 90 TABLET | Refills: 2 | Status: SHIPPED | OUTPATIENT
Start: 2019-04-18 | End: 2019-07-25

## 2019-04-20 ENCOUNTER — OFFICE VISIT (OUTPATIENT)
Dept: FAMILY MEDICINE CLINIC | Facility: CLINIC | Age: 49
End: 2019-04-20

## 2019-04-20 VITALS
DIASTOLIC BLOOD PRESSURE: 70 MMHG | RESPIRATION RATE: 24 BRPM | HEART RATE: 60 BPM | WEIGHT: 257 LBS | OXYGEN SATURATION: 99 % | SYSTOLIC BLOOD PRESSURE: 114 MMHG | BODY MASS INDEX: 42.77 KG/M2

## 2019-04-20 DIAGNOSIS — I10 ESSENTIAL HYPERTENSION: Primary | ICD-10-CM

## 2019-04-20 DIAGNOSIS — E66.01 CLASS 3 SEVERE OBESITY DUE TO EXCESS CALORIES WITH SERIOUS COMORBIDITY AND BODY MASS INDEX (BMI) OF 40.0 TO 44.9 IN ADULT (HCC): ICD-10-CM

## 2019-04-20 PROCEDURE — 99214 OFFICE O/P EST MOD 30 MIN: CPT | Performed by: FAMILY MEDICINE

## 2019-04-20 NOTE — PATIENT INSTRUCTIONS
1.  Reduce hydralazine dosage to 1/2 tablet 3 times daily for 2 weeks  · If pressure stays about the same < 120/80, come off hydralazine altogether and continue weight loss  · If pressure rises but stays below 140/90, stay on 1/2 tablet and keep losing weight  · If pressure rises above 140/90, go back to full tablets

## 2019-04-20 NOTE — PROGRESS NOTES
Subjective   Valente Arndt Jr. is a 49 y.o. male.     Chief Complaint   Patient presents with   • Hypertension     Post CVA of August 2018       Cerebrovascular Accident   Pertinent negatives include no chest pain or coughing.        Patient presents today for follow-up on hypertension and obesity.  He was hospitalized from 8/4/18-8/9/18 at [facility] following a cerebrovascular accident/intracranial hemorrhage.  Since August, he has been able to lose almost 70 pounds from his peak weight of 320 pounds.  He has lost 9 pounds since his last visit in January.  His blood pressure has had increasingly improved control as he has increased exercise and lost weight, he is still taking lisinopril 30mg BID and hydralazine 50mg TID. He is attempting to bring his LDL below 100 with diet and exercise, most recently was at 165. He has been able to continually bring his weight down through walking and calorie restriction. He feels much improved.    The following portions of the patient's history were reviewed and updated as appropriate: allergies, current medications, past family history, past medical history, past social history, past surgical history and problem list.    Active Ambulatory Problems     Diagnosis Date Noted   • ICH (intracerebral hemorrhage) (CMS/HCC) 08/04/2018   • Essential hypertension 08/05/2018   • Class 3 severe obesity due to excess calories with serious comorbidity and body mass index (BMI) of 45.0 to 49.9 in adult (CMS/HCC) 08/05/2018   • Pure hypercholesterolemia 10/08/2018     Resolved Ambulatory Problems     Diagnosis Date Noted   • H/O noncompliance with medical treatment, presenting hazards to health 08/05/2018   • Other constipation 08/08/2018     Past Medical History:   Diagnosis Date   • Hypertension    • Stroke (CMS/HCC)      Past Surgical History:   Procedure Laterality Date   • APPENDECTOMY     • FOOT SURGERY     • WISDOM TOOTH EXTRACTION       Family History   Problem  Relation Age of Onset   • Obesity Mother    • Hypertension Father    • Kidney disease Father    • Heart murmur Father    • No Known Problems Daughter    • No Known Problems Daughter      Social History     Socioeconomic History   • Marital status: Single     Spouse name: Not on file   • Number of children: Not on file   • Years of education: Not on file   • Highest education level: Not on file   Tobacco Use   • Smoking status: Never Smoker   • Smokeless tobacco: Never Used   Substance and Sexual Activity   • Alcohol use: Yes     Comment: rare occasion   • Drug use: No   • Sexual activity: No     Partners: Female         Review of Systems   Constitutional:        Continued purposeful weight loss   HENT: Negative.    Respiratory: Negative for cough, shortness of breath and wheezing.    Cardiovascular: Negative for chest pain and palpitations.       Objective   Blood pressure 114/70, pulse 60, resp. rate 24, weight 117 kg (257 lb), SpO2 99 %.  Nursing note reviewed  Physical Exam  Const: NAD, A&Ox4, Pleasant, Cooperative- severe obesity   Eyes: EOMI, no conjunctivitis  ENT: No nasal discharge present, neck supple; mucous membranes moist, dentition adequate  Cardiac: Regular rate and rhythm, no peripheral edema or cyanosis  Resp: Respiratory rate within normal limits, no increased work of breathing, no audible wheezing or retractions noted  GI: No distention or ascites  MSK: Motor and sensation grossly intact in bilateral upper extremities.  Neurologic, CN II-XII grossly intact  Psych: Appropriate mood and behavior.  Skin: Pink, warm, dry  Procedures  Assessment/Plan   Valente was seen today for hypertension.    Diagnoses and all orders for this visit:    Essential hypertension    Class 3 severe obesity due to excess calories with serious comorbidity and body mass index (BMI) of 40.0 to 44.9 in adult (CMS/[facility])      #1 history of CVA  Thought to be secondary to uncontrolled hypertension.  He is improved and should have no  significant lasting deficits.  · He should continue medications for hypertension  · His already significant weight loss should continue to be beneficial.  He is at goal, 114/70 today    #2 hypertension  · His already significant weight loss should continue to be beneficial.  He is at goal, 114/70 today  · He may come down on his medications with ongoing weight loss    #3 obesity  He is doing very well.  He has been able to lose almost 70 pounds in the last 5-1/2 months.  He does not count his calories daily, but has cut out soda, second portions, and empty calories.  He should continue walking 2 miles per day as he has been doing.  · We discussed the fact that he will require continuously lower calorie intake level versus weight declines.  Winter will also continue to be a difficult time for outdoor walking, I recommended again that he join a low-cost gym for at least elliptical and treadmill training    #4 hyperlipidemia  Lab Results   Component Value Date    CHOL 200 01/16/2019    TRIG 84 01/16/2019    HDL 38 (L) 01/16/2019     (H) 01/16/2019   Given his history he should be on a statin regardless, however he would like to try to get his LDL below 100 through diet and exercise.  His benefit from a statin is significant.    Patient Instructions   1.  Reduce hydralazine dosage to 1/2 tablet 3 times daily for 2 weeks  · If pressure stays about the same < 120/80, come off hydralazine altogether and continue weight loss  · If pressure rises but stays below 140/90, stay on 1/2 tablet and keep losing weight  · If pressure rises above 140/90, go back to full tablets      Ambulatory progress note signed and attested to by Raoul Anthony D.O.

## 2019-07-25 ENCOUNTER — OFFICE VISIT (OUTPATIENT)
Dept: FAMILY MEDICINE CLINIC | Facility: CLINIC | Age: 49
End: 2019-07-25

## 2019-07-25 ENCOUNTER — APPOINTMENT (OUTPATIENT)
Dept: LAB | Facility: HOSPITAL | Age: 49
End: 2019-07-25

## 2019-07-25 VITALS
OXYGEN SATURATION: 97 % | BODY MASS INDEX: 42.99 KG/M2 | DIASTOLIC BLOOD PRESSURE: 80 MMHG | HEART RATE: 63 BPM | HEIGHT: 65 IN | SYSTOLIC BLOOD PRESSURE: 132 MMHG | WEIGHT: 258 LBS

## 2019-07-25 DIAGNOSIS — E66.01 CLASS 3 SEVERE OBESITY DUE TO EXCESS CALORIES WITH SERIOUS COMORBIDITY AND BODY MASS INDEX (BMI) OF 40.0 TO 44.9 IN ADULT (HCC): ICD-10-CM

## 2019-07-25 DIAGNOSIS — E78.00 PURE HYPERCHOLESTEROLEMIA: ICD-10-CM

## 2019-07-25 DIAGNOSIS — I10 ESSENTIAL HYPERTENSION: Primary | ICD-10-CM

## 2019-07-25 LAB
ALBUMIN SERPL-MCNC: 4.2 G/DL (ref 3.5–5.2)
ALBUMIN/GLOB SERPL: 1.5 G/DL
ALP SERPL-CCNC: 74 U/L (ref 39–117)
ALT SERPL W P-5'-P-CCNC: 14 U/L (ref 1–41)
ANION GAP SERPL CALCULATED.3IONS-SCNC: 9.2 MMOL/L (ref 5–15)
AST SERPL-CCNC: 13 U/L (ref 1–40)
BILIRUB SERPL-MCNC: 0.2 MG/DL (ref 0.2–1.2)
BUN BLD-MCNC: 11 MG/DL (ref 6–20)
BUN/CREAT SERPL: 10.3 (ref 7–25)
CALCIUM SPEC-SCNC: 9.6 MG/DL (ref 8.6–10.5)
CHLORIDE SERPL-SCNC: 101 MMOL/L (ref 98–107)
CHOLEST SERPL-MCNC: 156 MG/DL (ref 0–200)
CO2 SERPL-SCNC: 28.8 MMOL/L (ref 22–29)
CREAT BLD-MCNC: 1.07 MG/DL (ref 0.76–1.27)
GFR SERPL CREATININE-BSD FRML MDRD: 73 ML/MIN/1.73
GLOBULIN UR ELPH-MCNC: 2.8 GM/DL
GLUCOSE BLD-MCNC: 108 MG/DL (ref 65–99)
HDLC SERPL-MCNC: 40 MG/DL (ref 40–60)
LDLC SERPL CALC-MCNC: 106 MG/DL (ref 0–100)
LDLC/HDLC SERPL: 2.66 {RATIO}
POTASSIUM BLD-SCNC: 4.6 MMOL/L (ref 3.5–5.2)
PROT SERPL-MCNC: 7 G/DL (ref 6–8.5)
SODIUM BLD-SCNC: 139 MMOL/L (ref 136–145)
TRIGL SERPL-MCNC: 48 MG/DL (ref 0–150)
VLDLC SERPL-MCNC: 9.6 MG/DL (ref 5–40)

## 2019-07-25 PROCEDURE — 99214 OFFICE O/P EST MOD 30 MIN: CPT | Performed by: FAMILY MEDICINE

## 2019-07-25 PROCEDURE — 80053 COMPREHEN METABOLIC PANEL: CPT | Performed by: FAMILY MEDICINE

## 2019-07-25 PROCEDURE — 80061 LIPID PANEL: CPT | Performed by: FAMILY MEDICINE

## 2019-08-06 ENCOUNTER — HOSPITAL ENCOUNTER (INPATIENT)
Facility: HOSPITAL | Age: 49
LOS: 3 days | Discharge: HOME | End: 2019-08-09
Attending: EMERGENCY MEDICINE | Admitting: SURGERY

## 2019-08-06 ENCOUNTER — APPOINTMENT (OUTPATIENT)
Dept: GENERAL RADIOLOGY | Facility: HOSPITAL | Age: 49
End: 2019-08-06

## 2019-08-06 ENCOUNTER — APPOINTMENT (OUTPATIENT)
Dept: ULTRASOUND IMAGING | Facility: HOSPITAL | Age: 49
End: 2019-08-06

## 2019-08-06 DIAGNOSIS — D72.829 LEUKOCYTOSIS, UNSPECIFIED TYPE: ICD-10-CM

## 2019-08-06 DIAGNOSIS — R11.2 NAUSEA AND VOMITING, INTRACTABILITY OF VOMITING NOT SPECIFIED, UNSPECIFIED VOMITING TYPE: ICD-10-CM

## 2019-08-06 DIAGNOSIS — K81.9 CHOLECYSTITIS: ICD-10-CM

## 2019-08-06 DIAGNOSIS — K80.00 ACUTE CALCULOUS CHOLECYSTITIS: Primary | ICD-10-CM

## 2019-08-06 PROBLEM — E66.01 MORBID OBESITY (HCC): Status: ACTIVE | Noted: 2019-08-06

## 2019-08-06 LAB
ALBUMIN SERPL-MCNC: 4.3 G/DL (ref 3.5–5.2)
ALBUMIN/GLOB SERPL: 1.2 G/DL
ALP SERPL-CCNC: 80 U/L (ref 39–117)
ALT SERPL W P-5'-P-CCNC: 14 U/L (ref 1–41)
ANION GAP SERPL CALCULATED.3IONS-SCNC: 11 MMOL/L (ref 5–15)
AST SERPL-CCNC: 18 U/L (ref 1–40)
BASOPHILS # BLD AUTO: 0.07 10*3/MM3 (ref 0–0.2)
BASOPHILS NFR BLD AUTO: 0.5 % (ref 0–1.5)
BILIRUB SERPL-MCNC: 0.4 MG/DL (ref 0.2–1.2)
BILIRUB UR QL STRIP: NEGATIVE
BUN BLD-MCNC: 20 MG/DL (ref 6–20)
BUN/CREAT SERPL: 20 (ref 7–25)
CALCIUM SPEC-SCNC: 9.9 MG/DL (ref 8.6–10.5)
CHLORIDE SERPL-SCNC: 102 MMOL/L (ref 98–107)
CLARITY UR: CLEAR
CO2 SERPL-SCNC: 28 MMOL/L (ref 22–29)
COLOR UR: YELLOW
CREAT BLD-MCNC: 1 MG/DL (ref 0.76–1.27)
D-LACTATE SERPL-SCNC: 1.6 MMOL/L (ref 0.5–2)
DEPRECATED RDW RBC AUTO: 42.9 FL (ref 37–54)
EOSINOPHIL # BLD AUTO: 0.06 10*3/MM3 (ref 0–0.4)
EOSINOPHIL NFR BLD AUTO: 0.4 % (ref 0.3–6.2)
ERYTHROCYTE [DISTWIDTH] IN BLOOD BY AUTOMATED COUNT: 13.1 % (ref 12.3–15.4)
GFR SERPL CREATININE-BSD FRML MDRD: 79 ML/MIN/1.73
GLOBULIN UR ELPH-MCNC: 3.6 GM/DL
GLUCOSE BLD-MCNC: 141 MG/DL (ref 65–99)
GLUCOSE UR STRIP-MCNC: NEGATIVE MG/DL
HCT VFR BLD AUTO: 45.1 % (ref 37.5–51)
HGB BLD-MCNC: 14.4 G/DL (ref 13–17.7)
HGB UR QL STRIP.AUTO: NEGATIVE
HOLD SPECIMEN: NORMAL
HOLD SPECIMEN: NORMAL
IMM GRANULOCYTES # BLD AUTO: 0.05 10*3/MM3 (ref 0–0.05)
IMM GRANULOCYTES NFR BLD AUTO: 0.3 % (ref 0–0.5)
KETONES UR QL STRIP: NEGATIVE
LEUKOCYTE ESTERASE UR QL STRIP.AUTO: NEGATIVE
LIPASE SERPL-CCNC: 24 U/L (ref 13–60)
LYMPHOCYTES # BLD AUTO: 0.98 10*3/MM3 (ref 0.7–3.1)
LYMPHOCYTES NFR BLD AUTO: 6.8 % (ref 19.6–45.3)
MCH RBC QN AUTO: 28.9 PG (ref 26.6–33)
MCHC RBC AUTO-ENTMCNC: 31.9 G/DL (ref 31.5–35.7)
MCV RBC AUTO: 90.6 FL (ref 79–97)
MONOCYTES # BLD AUTO: 0.7 10*3/MM3 (ref 0.1–0.9)
MONOCYTES NFR BLD AUTO: 4.8 % (ref 5–12)
NEUTROPHILS # BLD AUTO: 12.61 10*3/MM3 (ref 1.7–7)
NEUTROPHILS NFR BLD AUTO: 87.2 % (ref 42.7–76)
NITRITE UR QL STRIP: NEGATIVE
NRBC BLD AUTO-RTO: 0 /100 WBC (ref 0–0.2)
PH UR STRIP.AUTO: 5.5 [PH] (ref 5–8)
PLATELET # BLD AUTO: 278 10*3/MM3 (ref 140–450)
PMV BLD AUTO: 11.3 FL (ref 6–12)
POTASSIUM BLD-SCNC: 4 MMOL/L (ref 3.5–5.2)
PROT SERPL-MCNC: 7.9 G/DL (ref 6–8.5)
PROT UR QL STRIP: NEGATIVE
RBC # BLD AUTO: 4.98 10*6/MM3 (ref 4.14–5.8)
SODIUM BLD-SCNC: 141 MMOL/L (ref 136–145)
SP GR UR STRIP: 1.02 (ref 1–1.03)
TROPONIN T SERPL-MCNC: <0.01 NG/ML (ref 0–0.03)
UROBILINOGEN UR QL STRIP: NORMAL
WBC NRBC COR # BLD: 14.47 10*3/MM3 (ref 3.4–10.8)
WHOLE BLOOD HOLD SPECIMEN: NORMAL
WHOLE BLOOD HOLD SPECIMEN: NORMAL

## 2019-08-06 PROCEDURE — 93005 ELECTROCARDIOGRAM TRACING: CPT | Performed by: EMERGENCY MEDICINE

## 2019-08-06 PROCEDURE — 25010000002 PIPERACILLIN SOD-TAZOBACTAM PER 1 G: Performed by: PHYSICIAN ASSISTANT

## 2019-08-06 PROCEDURE — 84484 ASSAY OF TROPONIN QUANT: CPT | Performed by: EMERGENCY MEDICINE

## 2019-08-06 PROCEDURE — 87040 BLOOD CULTURE FOR BACTERIA: CPT | Performed by: PHYSICIAN ASSISTANT

## 2019-08-06 PROCEDURE — 81003 URINALYSIS AUTO W/O SCOPE: CPT | Performed by: EMERGENCY MEDICINE

## 2019-08-06 PROCEDURE — 99223 1ST HOSP IP/OBS HIGH 75: CPT | Performed by: HOSPITALIST

## 2019-08-06 PROCEDURE — 25010000002 PIPERACILLIN SOD-TAZOBACTAM PER 1 G: Performed by: HOSPITALIST

## 2019-08-06 PROCEDURE — 99285 EMERGENCY DEPT VISIT HI MDM: CPT

## 2019-08-06 PROCEDURE — 83605 ASSAY OF LACTIC ACID: CPT | Performed by: PHYSICIAN ASSISTANT

## 2019-08-06 PROCEDURE — 80053 COMPREHEN METABOLIC PANEL: CPT | Performed by: EMERGENCY MEDICINE

## 2019-08-06 PROCEDURE — 71045 X-RAY EXAM CHEST 1 VIEW: CPT

## 2019-08-06 PROCEDURE — 83690 ASSAY OF LIPASE: CPT | Performed by: EMERGENCY MEDICINE

## 2019-08-06 PROCEDURE — 76705 ECHO EXAM OF ABDOMEN: CPT

## 2019-08-06 PROCEDURE — 25010000002 MORPHINE PER 10 MG: Performed by: EMERGENCY MEDICINE

## 2019-08-06 PROCEDURE — 85025 COMPLETE CBC W/AUTO DIFF WBC: CPT | Performed by: EMERGENCY MEDICINE

## 2019-08-06 PROCEDURE — 25010000002 ONDANSETRON PER 1 MG: Performed by: PHYSICIAN ASSISTANT

## 2019-08-06 RX ORDER — SODIUM CHLORIDE 0.9 % (FLUSH) 0.9 %
3-10 SYRINGE (ML) INJECTION AS NEEDED
Status: DISCONTINUED | OUTPATIENT
Start: 2019-08-06 | End: 2019-08-09 | Stop reason: HOSPADM

## 2019-08-06 RX ORDER — MORPHINE SULFATE 4 MG/ML
4 INJECTION, SOLUTION INTRAMUSCULAR; INTRAVENOUS ONCE
Status: COMPLETED | OUTPATIENT
Start: 2019-08-06 | End: 2019-08-06

## 2019-08-06 RX ORDER — ONDANSETRON 2 MG/ML
4 INJECTION INTRAMUSCULAR; INTRAVENOUS EVERY 6 HOURS PRN
Status: DISCONTINUED | OUTPATIENT
Start: 2019-08-06 | End: 2019-08-09 | Stop reason: HOSPADM

## 2019-08-06 RX ORDER — LABETALOL HYDROCHLORIDE 5 MG/ML
10 INJECTION, SOLUTION INTRAVENOUS EVERY 4 HOURS PRN
Status: DISCONTINUED | OUTPATIENT
Start: 2019-08-06 | End: 2019-08-09 | Stop reason: HOSPADM

## 2019-08-06 RX ORDER — SODIUM CHLORIDE 0.9 % (FLUSH) 0.9 %
3 SYRINGE (ML) INJECTION EVERY 12 HOURS SCHEDULED
Status: DISCONTINUED | OUTPATIENT
Start: 2019-08-06 | End: 2019-08-09 | Stop reason: HOSPADM

## 2019-08-06 RX ORDER — ACETAMINOPHEN 325 MG/1
650 TABLET ORAL EVERY 4 HOURS PRN
Status: DISCONTINUED | OUTPATIENT
Start: 2019-08-06 | End: 2019-08-09 | Stop reason: HOSPADM

## 2019-08-06 RX ORDER — MORPHINE SULFATE 2 MG/ML
2 INJECTION, SOLUTION INTRAMUSCULAR; INTRAVENOUS EVERY 4 HOURS PRN
Status: DISCONTINUED | OUTPATIENT
Start: 2019-08-06 | End: 2019-08-09 | Stop reason: HOSPADM

## 2019-08-06 RX ORDER — ONDANSETRON 2 MG/ML
4 INJECTION INTRAMUSCULAR; INTRAVENOUS ONCE
Status: COMPLETED | OUTPATIENT
Start: 2019-08-06 | End: 2019-08-06

## 2019-08-06 RX ORDER — NALOXONE HCL 0.4 MG/ML
0.4 VIAL (ML) INJECTION
Status: DISCONTINUED | OUTPATIENT
Start: 2019-08-06 | End: 2019-08-09 | Stop reason: HOSPADM

## 2019-08-06 RX ORDER — HYDROCODONE BITARTRATE AND ACETAMINOPHEN 5; 325 MG/1; MG/1
1 TABLET ORAL EVERY 4 HOURS PRN
Status: DISCONTINUED | OUTPATIENT
Start: 2019-08-06 | End: 2019-08-07

## 2019-08-06 RX ORDER — SODIUM CHLORIDE 0.9 % (FLUSH) 0.9 %
10 SYRINGE (ML) INJECTION AS NEEDED
Status: DISCONTINUED | OUTPATIENT
Start: 2019-08-06 | End: 2019-08-09 | Stop reason: HOSPADM

## 2019-08-06 RX ORDER — SODIUM CHLORIDE 9 MG/ML
100 INJECTION, SOLUTION INTRAVENOUS CONTINUOUS
Status: DISCONTINUED | OUTPATIENT
Start: 2019-08-06 | End: 2019-08-07 | Stop reason: SDUPTHER

## 2019-08-06 RX ADMIN — LISINOPRIL 30 MG: 20 TABLET ORAL at 21:06

## 2019-08-06 RX ADMIN — HYDROCODONE BITARTRATE AND ACETAMINOPHEN 1 TABLET: 5; 325 TABLET ORAL at 19:49

## 2019-08-06 RX ADMIN — TAZOBACTAM SODIUM AND PIPERACILLIN SODIUM 3.38 G: 375; 3 INJECTION, SOLUTION INTRAVENOUS at 17:30

## 2019-08-06 RX ADMIN — TAZOBACTAM SODIUM AND PIPERACILLIN SODIUM 3.38 G: 375; 3 INJECTION, SOLUTION INTRAVENOUS at 11:51

## 2019-08-06 RX ADMIN — MORPHINE SULFATE 4 MG: 4 INJECTION INTRAVENOUS at 07:54

## 2019-08-06 RX ADMIN — SODIUM CHLORIDE 1000 ML: 9 INJECTION, SOLUTION INTRAVENOUS at 07:55

## 2019-08-06 RX ADMIN — ONDANSETRON 4 MG: 2 INJECTION INTRAMUSCULAR; INTRAVENOUS at 07:53

## 2019-08-06 RX ADMIN — SODIUM CHLORIDE 100 ML/HR: 9 INJECTION, SOLUTION INTRAVENOUS at 15:56

## 2019-08-06 RX ADMIN — HYDROCODONE BITARTRATE AND ACETAMINOPHEN 1 TABLET: 5; 325 TABLET ORAL at 15:56

## 2019-08-06 NOTE — PAYOR COMM NOTE
"Valente Yen JrUzair (49 y.o. Male)   Ref # [id number]     Dept- Lita Flower RN  [phone]  Fax- [phone]      Date of Birth Social Security Number Address Home Phone MRN    1970  [address] [place] [address]   [facility] [address] [phone] [id number]    Alevism Marital Status          None Single       Admission Date Admission Type Admitting Provider Attending Provider Department, Room/Bed    19 Emergency Hao Wilson MD Repass, Gregory L, MD [facility] 2G, S224/    Discharge Date Discharge Disposition Discharge Destination                       Attending Provider:  Hao Wilson MD    Allergies:  No Known Allergies    Isolation:  None   Infection:  None   Code Status:  CPR    Ht:  167.6 cm (66\")   Wt:  116 kg (255 lb)    Admission Cmt:  None   Principal Problem:  Acute calculous cholecystitis [K80.00]                 Active Insurance as of 2019     Primary Coverage     Payor Plan Insurance Group Employer/Plan Group    [employer] [facility] [employer] BLUE [employer]IELD PPO [id number]     Payor Plan Address Payor Plan Phone Number Payor Plan Fax Number Effective Dates    [address] [phone]  2014 - None Entered    [place] [address]       Subscriber Name Subscriber Birth Date Member ID       VALENTE YEN JR. 1970 [id number]                 Emergency Contacts      (Rel.) Home Phone Work Phone Mobile Phone    Valente Yen Sr (Father) [phone] -- [phone]            Insurance Information                [employer] BLUE CROSS BLUE SHIELD PPO Phone: [phone]    Subscriber: Valente Yen JrUzair Subscriber#: [id number]    Group#: [id number] Precert#:              History & Physical      Hao Wilson MD at 2019 12:31 PM              [facility] Medicine Services  HISTORY AND PHYSICAL    Patient Name: Valente Yen Jr.  : 1970  MRN: [id number]  Primary Care " "Physician: Raoul Anthony DO  Date of admission: 8/6/2019      Subjective   Subjective     Chief Complaint:  Abdominal pain    HPI:   Mr. Arndt is a 49-year-old male with past history of hypertension, left basal ganglia ICH (nonaneurysmal) 8/2018, and morbid obesity who presented to the [facility] ER this morning for evaluation of acute, \"throbbing,\" 8/10 right upper quadrant abdominal pain, radiating occasionally to right shoulder that woke him from sleep at 1AM with associated chills and nausea and yellow to clear emesis x 5. Pain was aggravated by nothing, alleviated by IV morphine in ER. Had similar episode 1 week ago that resolved after he vomited. No prior hx of gallbladder disease. Hx of open appendectomy in the remote past.  No fevers, chest pain, palpitations, diarrhea, melena, or hematochezia.     Abdominal pain was 5/10 during my evaluation. Nausea had resolved with PRN meds in the ER.     ER evaluation confirmed acute cholecystitis and pt was admitted for evaluation and management. Dr. Hemphill agreed to see the patient in surgical consultation.     Review of Systems   Otherwise comprehensive ROS reviewed and is negative except as mentioned in the HPI.    Personal History     Past Medical History:   Diagnosis Date   • Hypertension    • Left Intracranial hemorrhage (CMS/HCC)     pt reports resolution to right-sided hemiparesis    • Stroke (CMS/HCC)        Past Surgical History:   Procedure Laterality Date   • APPENDECTOMY      Open   • FOOT SURGERY     • WISDOM TOOTH EXTRACTION         Family History: family history includes Heart murmur in his father; Hypertension in his father; Kidney disease in his father; No Known Problems in his daughter and daughter; Obesity in his mother. Otherwise pertinent FHx was reviewed and unremarkable.     Social History:  reports that he has never smoked. He has never used smokeless tobacco. He reports that he drinks alcohol. He reports that he does not use " drugs.  Social History     Social History Narrative    Lives with daughter and mother. Works at [employer].        Medications:    Available home medication information reviewed.    Current Outpatient Medications:   •  lisinopril (PRINIVIL,ZESTRIL) 30 MG tablet, Take 1 tablet by mouth Every 12 (Twelve) Hours., Disp: 180 tablet, Rfl: 1    No Known Allergies    Objective   Objective     Vital Signs:   Temp:  [98.5 °F (36.9 °C)-98.6 °F (37 °C)] 98.5 °F (36.9 °C)  Heart Rate:  [53-65] 55  Resp:  [16] 16  BP: (159-188)/() 174/97      Physical Exam   Constitutional: Awake, alert, mild distress  Eyes: PERRLA, sclerae anicteric, no conjunctival injection  HENT: NCAT, mucous membranes dry  Neck: Supple, no thyromegaly, no lymphadenopathy, trachea midline  Respiratory: Clear to auscultation bilaterally, nonlabored respirations   Cardiovascular: RRR, no murmurs, rubs, or gallops, palpable pedal pulses bilaterally  Gastrointestinal: Positive bowel sounds, soft, moderate RUQ tenderness to palpation with positive sanchez's sign, nondistended  Musculoskeletal: No bilateral ankle edema, no clubbing or cyanosis to extremities  Psychiatric: Flat affect, cooperative  Neurologic: Oriented x 3, strength symmetric in all extremities (no obvious right-sided weakness), Cranial Nerves grossly intact to confrontation, speech clear  Skin: No rashes    Results Reviewed:  I have personally reviewed current lab, radiology, and data and agree.    Results from last 7 days   Lab Units 08/06/19  0724   WBC 10*3/mm3 14.47*   HEMOGLOBIN g/dL 14.4   HEMATOCRIT % 45.1   PLATELETS 10*3/mm3 278     Results from last 7 days   Lab Units 08/06/19  1138 08/06/19  0724   SODIUM mmol/L  --  141   POTASSIUM mmol/L  --  4.0   CHLORIDE mmol/L  --  102   CO2 mmol/L  --  28.0   BUN mg/dL  --  20   CREATININE mg/dL  --  1.00   GLUCOSE mg/dL  --  141*   CALCIUM mg/dL  --  9.9   ALT (SGPT) U/L  --  14   AST (SGOT) U/L  --  18   TROPONIN T ng/mL  --  <0.010   LACTATE  mmol/L 1.6  --      Estimated Creatinine Clearance: 107.1 mL/min (by C-G formula based on SCr of 1 mg/dL).  Brief Urine Lab Results  (Last result in the past 365 days)      Color   Clarity   Blood   Leuk Est   Nitrite   Protein   CREAT   Urine HCG        08/06/19 0718 Yellow Clear Negative Negative Negative Negative             Imaging Results (last 24 hours)     Procedure Component Value Units Date/Time    US Gallbladder [[id number]] Collected:  08/06/19 0840     Updated:  08/06/19 1011    Narrative:       EXAMINATION: US GALLBLADDER-08/06/2019:     INDICATION: RUQ TENDERNESS, N/V.     TECHNIQUE: Ultrasonographic images of the right upper quadrant were  obtained.     COMPARISON: NONE.     FINDINGS: The pancreas is poorly visualized secondary to bowel gas  artifact. Within the limitations, no obvious pancreatic abnormality  identified. The liver demonstrate a homogeneous echotexture without  evidence of hepatic lesion. No perihepatic fluid identified.     The gallbladder demonstrates echogenic foci with posterior acoustic  shadowing consistent with cholelithiasis. These stones are at the neck  of the gallbladder. Echogenic debris is also noted within the  gallbladder. The ultrasound technologist did note a positive Loo's  sign. Gallbladder wall thickening measuring up to 0.4 cm. No  pericholecystic fluid identified. The common bile duct measures 0.6 cm  which is slightly prominent for 49-year-old male.     The right kidney measures 10.2 x 4.6 x 5.8 cm. No renal mass or  hydronephrosis identified.       Impression:       1.  Distended gallbladder with echogenic gallstones noted within the  neck of the gallbladder and gallbladder wall thickening measuring up to  0.4 cm. These findings may relate to acute cholecystitis in the  appropriate clinical setting.     2.  The common bile duct measures up to 0.6 cm which is slightly  prominent given the patient's stated age.     D:  08/06/2019  E:  08/06/2019          XR  Chest 1 View [[id number]] Collected:  08/06/19 0744     Updated:  08/06/19 0907    Narrative:       EXAMINATION: XR CHEST 1 VW- 08/06/2019      INDICATION: Upper Abdominal Pain Triage Protocol      COMPARISON: Chest radiograph 08/04/2018     FINDINGS: Single portable chest radiograph is submitted for review. The  heart is prominent in size, similar to prior exam. Lungs are clear. No  pleural effusion or pneumothorax. Visualized upper abdomen is clear.  Small hiatal hernia suggested. The osseous structures appear intact.       Impression:       1. Low lung volumes without evidence of pneumonia.     2. The heart is prominent in size, similar to prior exam.     3. Small hiatal hernia suggested.     D:  08/06/2019  E:  08/06/2019                CXR personally reviewed, agree with official inter  ECG reviewed, sinus rhythm with 1st degree block, no significant abnormalities     Assessment/Plan   Assessment / Plan      Mr. rAndt is a 49-year-old male with past history of hypertension, left basal ganglia ICH (nonaneurysmal) 8/2018, and morbid obesity who presented to the [facility] Er today with acute onset of sharp, RUQ abdominal pain with associated nausea and bilious vomiting. Evaluation in the ER ruled in calculous cholecystitis.     Active Hospital Problems    Diagnosis POA   • **Acute calculous cholecystitis [K80.00] Yes   • Morbid obesity (CMS/HCC) [E66.01] Yes   • Essential hypertension [I10] Yes     Acute Calculous Cholecystitis   Prominent CBD without hyperbilirubinemia  - GB Ultrasound with typical findings. No clear evidence of choledocholithiasis and labs normal    - IV zosyn, IVF, IV morphine and lortab prn, IV antiemetics   - Dr. Hemphill consulted by ER for consideration of CCY  - Repeat CBC and CMP in AM     Uncontrolled Hypertension   - continue home lisinopril   - prn IV labetalol     History of Nonaneurysmal Left Basal Ganglia Intracranial Hemorrhage without residual deficits  Morbid Obesity    DVT  prophylaxis:  Mechanical given anticipated surgery   CODE STATUS:    Code Status and Medical Interventions:   Ordered at: 08/06/19 1237     Code Status:    CPR     Medical Interventions (Level of Support Prior to Arrest):    Full       Admission Status:  I believe this patient meets inpatient status due to anticipated need for surgery this admission in the setting of acute calculous cholecystitis.     Electronically signed by Hao Wilson MD, 08/06/19, 12:31 PM.          Electronically signed by Hao Wilson MD at 8/6/2019  1:23 PM          Emergency Department Notes      Lyubov Acevedo PA-C at 8/6/2019  7:16 AM     Attestation signed by James Elias DO at 8/6/2019  1:22 PM          For this patient encounter, I reviewed the NP or PA documentation, treatment plan, and medical decision making. James Elias DO 8/6/2019 1:22 PM                  Subjective   Mr. Valente Arndt Jr. is a 49 y.o. male who presents to the ED with c/o abdominal pain. He reports at 0100 today hedeveloped sharp right upper quadrant abdominal pain with N/V. He notes nothing alleviates or worsens the pain. He also complains of nonbilious nausea and vomiting. He describes the vomit as yellow. He denies diarrhea, blood in stool, changes in urination, shortness of breath, cough, coughing up blood, and unilateral leg pain. He has a history of ICH and hypertension. He denies a history of hyperlipidemia and DVT/PE. He also has a history of appendectomy and denies a history of cholecystectomy. His grandfather had a history of CAD. He has had no surgery or travel in the past 2 months. He is a nonsmoker and does not use alcohol or illegal drugs. There are no other acute complaints at this time.        History provided by:  Patient  Abdominal Pain   Pain location:  RUQ  Pain quality: gnawing and sharp    Pain radiates to:  Does not radiate  Pain severity:  Moderate  Onset quality:  Sudden  Duration:  7 hours  Timing:   Constant  Progression:  Worsening  Chronicity:  Recurrent  Context: not alcohol use    Relieved by:  None tried  Worsened by:  Nothing  Ineffective treatments:  None tried  Associated symptoms: nausea and vomiting    Associated symptoms: no chills, no cough, no diarrhea, no dysuria, no fever and no shortness of breath    Risk factors: obesity    Risk factors: not elderly        Review of Systems   Constitutional: Negative for chills and fever.   Respiratory: Negative for cough and shortness of breath.         Negative for coughing up blood.   Gastrointestinal: Positive for abdominal pain, nausea and vomiting. Negative for blood in stool and diarrhea.   Genitourinary: Negative for difficulty urinating, dysuria, frequency and urgency.   Musculoskeletal:        Negative for unilateral leg pain.   All other systems reviewed and are negative.      Past Medical History:   Diagnosis Date   • Hypertension    • Intracranial hemorrhage (CMS/HCC)    • Stroke (CMS/HCC)        No Known Allergies    Past Surgical History:   Procedure Laterality Date   • APPENDECTOMY     • FOOT SURGERY     • WISDOM TOOTH EXTRACTION         Family History   Problem Relation Age of Onset   • Obesity Mother    • Hypertension Father    • Kidney disease Father    • Heart murmur Father    • No Known Problems Daughter    • No Known Problems Daughter        Social History     Socioeconomic History   • Marital status: Single     Spouse name: Not on file   • Number of children: Not on file   • Years of education: Not on file   • Highest education level: Not on file   Tobacco Use   • Smoking status: Never Smoker   • Smokeless tobacco: Never Used   Substance and Sexual Activity   • Alcohol use: Yes     Comment: rare occasion   • Drug use: No   • Sexual activity: No     Partners: Female         Objective   Physical Exam   Constitutional: He is oriented to person, place, and time. He appears well-developed and well-nourished.   HENT:   Head: Normocephalic and  atraumatic.   Nose: Nose normal.   Eyes: Conjunctivae are normal. No scleral icterus.   Neck: Normal range of motion. Neck supple.   Cardiovascular: Normal rate, regular rhythm and normal heart sounds. Exam reveals no gallop and no friction rub.   Pulmonary/Chest: Effort normal and breath sounds normal. No respiratory distress. He has no wheezes. He has no rales.   Abdominal: Soft. Bowel sounds are normal. There is tenderness in the right upper quadrant. There is no rigidity, no rebound and no guarding.   Tenderness to the right upper quadrant.   Musculoskeletal: Normal range of motion.   Neurological: He is alert and oriented to person, place, and time.   Skin: Skin is warm and dry.   Psychiatric: He has a normal mood and affect. His behavior is normal.   Nursing note and vitals reviewed.      Procedures        ED Course  ED Course as of Aug 06 1017   Tue Aug 06, 2019   0747 Nitrite, UA: Negative [WT]   0907 WBC: (!) 14.47 [WT]   0913 Gallbladder u/s:     Impression        Distended gallbladder with echogenic gallstones noted within the neck of  the gallbladder and gallbladder wall thickening measuring up to 0.4 cm.  These findings may relate to acute cholecystitis in the appropriate  clinical setting.     The common bile duct measures up to 0.6 cm which is slightly prominent  given the patient's stated age.      [WT]   0913 Dr. Hemphill paged via [employer] for acute calculous cholecystitis   [WT]   1007 Neutrophils Absolute: (!) 12.61 [WT]   1007 EKG sinus bradycardia rate 53  [WT]   1016 Lipase: 24 [WT]   1016 Troponin T: <0.010 [WT]   1016 Glucose: (!) 141 [WT]   1016 @RESULTRCNT(24h)@  Note: In addition to lab results from this visit, the labs listed above may include labs taken at another facility or during a different encounter within the last 24 hours. Please correlate lab times with ED admission and discharge times for further clarification of the services performed during this  visit.    [unfilled]  [unfilled]  [unfilled]  ECG/EMG Results (last 24 hours)     Procedure Component Value Units Date/Time    ECG 12 Lead [[id number]] Collected:  08/06/19 0741     Updated:  08/06/19 0746    ECG 12 Lead [[id number]] Collected:  08/06/19 0716     Updated:  08/06/19 0748      [unfilled]        [WT]      ED Course User Index  [WT] Lyubov Acevedo, NIMO       Recent Results (from the past 24 hour(s))   Urinalysis With Microscopic If Indicated (No Culture) - Urine, Clean Catch    Collection Time: 08/06/19  7:18 AM   Result Value Ref Range    Color, UA Yellow Yellow, Straw    Appearance, UA Clear Clear    pH, UA 5.5 5.0 - 8.0    Specific Gravity, UA 1.021 1.001 - 1.030    Glucose, UA Negative Negative    Ketones, UA Negative Negative    Bilirubin, UA Negative Negative    Blood, UA Negative Negative    Protein, UA Negative Negative    Leuk Esterase, UA Negative Negative    Nitrite, UA Negative Negative    Urobilinogen, UA 0.2 E.U./dL 0.2 - 1.0 E.U./dL   Comprehensive Metabolic Panel    Collection Time: 08/06/19  7:24 AM   Result Value Ref Range    Glucose 141 (H) 65 - 99 mg/dL    BUN 20 6 - 20 mg/dL    Creatinine 1.00 0.76 - 1.27 mg/dL    Sodium 141 136 - 145 mmol/L    Potassium 4.0 3.5 - 5.2 mmol/L    Chloride 102 98 - 107 mmol/L    CO2 28.0 22.0 - 29.0 mmol/L    Calcium 9.9 8.6 - 10.5 mg/dL    Total Protein 7.9 6.0 - 8.5 g/dL    Albumin 4.30 3.50 - 5.20 g/dL    ALT (SGPT) 14 1 - 41 U/L    AST (SGOT) 18 1 - 40 U/L    Alkaline Phosphatase 80 39 - 117 U/L    Total Bilirubin 0.4 0.2 - 1.2 mg/dL    eGFR Non African Amer 79 >60 mL/min/1.73    Globulin 3.6 gm/dL    A/G Ratio 1.2 g/dL    BUN/Creatinine Ratio 20.0 7.0 - 25.0    Anion Gap 11.0 5.0 - 15.0 mmol/L   Lipase    Collection Time: 08/06/19  7:24 AM   Result Value Ref Range    Lipase 24 13 - 60 U/L   Troponin    Collection Time: 08/06/19  7:24 AM   Result Value Ref Range    Troponin T <0.010 0.000 - 0.030 ng/mL   Light Blue Top    Collection Time:  08/06/19  7:24 AM   Result Value Ref Range    Extra Tube hold for add-on    Green Top (Gel)    Collection Time: 08/06/19  7:24 AM   Result Value Ref Range    Extra Tube Hold for add-ons.    Lavender Top    Collection Time: 08/06/19  7:24 AM   Result Value Ref Range    Extra Tube hold for add-on    Gold Top - SST    Collection Time: 08/06/19  7:24 AM   Result Value Ref Range    Extra Tube Hold for add-ons.    CBC Auto Differential    Collection Time: 08/06/19  7:24 AM   Result Value Ref Range    WBC 14.47 (H) 3.40 - 10.80 10*3/mm3    RBC 4.98 4.14 - 5.80 10*6/mm3    Hemoglobin 14.4 13.0 - 17.7 g/dL    Hematocrit 45.1 37.5 - 51.0 %    MCV 90.6 79.0 - 97.0 fL    MCH 28.9 26.6 - 33.0 pg    MCHC 31.9 31.5 - 35.7 g/dL    RDW 13.1 12.3 - 15.4 %    RDW-SD 42.9 37.0 - 54.0 fl    MPV 11.3 6.0 - 12.0 fL    Platelets 278 140 - 450 10*3/mm3    Neutrophil % 87.2 (H) 42.7 - 76.0 %    Lymphocyte % 6.8 (L) 19.6 - 45.3 %    Monocyte % 4.8 (L) 5.0 - 12.0 %    Eosinophil % 0.4 0.3 - 6.2 %    Basophil % 0.5 0.0 - 1.5 %    Immature Grans % 0.3 0.0 - 0.5 %    Neutrophils, Absolute 12.61 (H) 1.70 - 7.00 10*3/mm3    Lymphocytes, Absolute 0.98 0.70 - 3.10 10*3/mm3    Monocytes, Absolute 0.70 0.10 - 0.90 10*3/mm3    Eosinophils, Absolute 0.06 0.00 - 0.40 10*3/mm3    Basophils, Absolute 0.07 0.00 - 0.20 10*3/mm3    Immature Grans, Absolute 0.05 0.00 - 0.05 10*3/mm3    nRBC 0.0 0.0 - 0.2 /100 WBC     Note: In addition to lab results from this visit, the labs listed above may include labs taken at another facility or during a different encounter within the last 24 hours. Please correlate lab times with ED admission and discharge times for further clarification of the services performed during this visit.    US Gallbladder   Preliminary Result   1.  Distended gallbladder with echogenic gallstones noted within the   neck of the gallbladder and gallbladder wall thickening measuring up to   0.4 cm. These findings may relate to acute cholecystitis in  the   appropriate clinical setting.       2.  The common bile duct measures up to 0.6 cm which is slightly   prominent given the patient's stated age.       D:  08/06/2019   E:  08/06/2019              XR Chest 1 View   Preliminary Result   1. Low lung volumes without evidence of pneumonia.       2. The heart is prominent in size, similar to prior exam.       3. Small hiatal hernia suggested.       D:  08/06/2019   E:  08/06/2019            Vitals:    08/06/19 0730 08/06/19 0731 08/06/19 0900 08/06/19 0914   BP: (!) 160/102  (!) 172/103    BP Location:       Patient Position:       Pulse:  64  61   Resp:       Temp:       TempSrc:       SpO2:  99%  94%   Weight:       Height:         Medications   sodium chloride 0.9 % flush 10 mL (not administered)   piperacillin-tazobactam (ZOSYN) 3.375 g in iso-osmotic dextrose 50 ml (premix) (not administered)   sodium chloride 0.9 % bolus 1,000 mL (1,000 mL Intravenous New Bag 8/6/19 0755)   ondansetron (ZOFRAN) injection 4 mg (4 mg Intravenous Given 8/6/19 0753)   Morphine sulfate (PF) injection 4 mg (4 mg Intravenous Given 8/6/19 0754)     ECG/EMG Results (last 24 hours)     Procedure Component Value Units Date/Time    ECG 12 Lead [[id number]] Collected:  08/06/19 0716     Updated:  08/06/19 0718        ECG 12 Lead         ECG 12 Lead                           MDM  Number of Diagnoses or Management Options  Acute calculous cholecystitis:   Leukocytosis, unspecified type:   Nausea and vomiting, intractability of vomiting not specified, unspecified vomiting type:   Diagnosis management comments: Patient presents complaining of epigastric pain with nausea and vomiting.  Differential diagnosis includes cholelithiasis, cholecystitis, gastritis, gastroenteritis, pancreatitis, ACS.  Plan to obtain CBC, CMP, lipase, urinalysis, EKG, troponin, gallbladder ultrasound, chest x-ray.  Will administer IV fluids, morphine, Zofran and reassess. Patient has acute calculous cholecystitis on  "gallbladder u/s. He has normal LFT's and lipase. He does have some leukocytosis.  Negative troponin and no ST elevation on EKG.  General surgery, Dr. Hemphill consulted and [facility] Blood cultures and lactate pending. Dr. Wilson will admit the patient. Patient stable and agreeable to plan.       Final diagnoses:   Acute calculous cholecystitis   Nausea and vomiting, intractability of vomiting not specified, unspecified vomiting type   Leukocytosis, unspecified type       Documentation assistance provided by dean Young.  Information recorded by the scribe was done at my direction and has been verified and validated by me.     Delphine Young  08/06/19 0801       Lyubov Acevedo, NIMO  08/06/19 1017      Electronically signed by James Elias DO at 8/6/2019  1:22 PM       ICU Vital Signs     Row Name 08/06/19 1400 08/06/19 1230 08/06/19 1225 08/06/19 1100 08/06/19 1030       Vitals    Temp  --  --  98.5 °F (36.9 °C)  --  --    Temp src  --  --  Oral  --  --    Pulse  --  --  55  53  57    Heart Rate Source  --  --  Monitor  --  --    Resp  --  --  16  --  --    Resp Rate Source  --  --  Visual  --  --    BP  --  --  174/97  159/88  160/93    Noninvasive MAP (mmHg)  --  --  --  113  114    BP Location  --  --  Left arm  --  --    BP Method  --  --  Automatic  --  --    Patient Position  --  --  Lying  --  --       Oxygen Therapy    SpO2  --  --  98 %  95 %  98 %    Device (Oxygen Therapy)  room air  room air  room air  --  --    Row Name 08/06/19 1000 08/06/19 0930 08/06/19 0914 08/06/19 0900 08/06/19 0731       Vitals    Pulse  53  65  61  --  64    BP  162/111  (Abnormal)   165/102  (Abnormal)   --  172/103  (Abnormal)   --    Noninvasive MAP (mmHg)  117  119  --  --  --       Oxygen Therapy    SpO2  98 %  97 %  94 %  --  99 %    Row Name 08/06/19 0730 08/06/19 0720 08/06/19 0719 08/06/19 0708          Height and Weight    Height  --  --  --  167.6 cm (66\")     " Height Method  --  --  --  Stated     Weight  --  --  --  116 kg (255 lb)     Weight Method  --  --  --  Stated     Ideal Body Weight (IBW) (kg)  --  --  --  65.3     BSA (Calculated - sq m)  --  --  --  2.22 sq meters     BMI (Calculated)  --  --  --  41.2     Weight in (lb) to have BMI = 25  --  --  --  154.6        Vitals    Temp  --  --  --  98.6 °F (37 °C)     Temp src  --  --  --  Oral     Pulse  --  57  --  61     Heart Rate Source  --  --  --  Monitor     Resp  --  --  --  16     Resp Rate Source  --  --  --  Visual     BP  160/102  (Abnormal)   --  169/100  188/104  (Abnormal)      Noninvasive MAP (mmHg)  113  --  121  141     BP Location  --  --  --  Left arm     BP Method  --  --  --  Automatic     Patient Position  --  --  --  Sitting        Oxygen Therapy    SpO2  --  98 %  97 %  97 %     Device (Oxygen Therapy)  --  --  --  room air         Hospital Medications (all)       Dose Frequency Start End    labetalol (NORMODYNE,TRANDATE) injection 10 mg 10 mg Every 4 Hours PRN 8/6/2019     Sig - Route: Infuse 2 mL into a venous catheter Every 4 (Four) Hours As Needed for High Blood Pressure. - Intravenous    Morphine sulfate (PF) injection 4 mg 4 mg Once 8/6/2019 8/6/2019    Sig - Route: Infuse 1 mL into a venous catheter 1 (One) Time. - Intravenous    ondansetron (ZOFRAN) injection 4 mg 4 mg Once 8/6/2019 8/6/2019    Sig - Route: Infuse 2 mL into a venous catheter 1 (One) Time. - Intravenous    piperacillin-tazobactam (ZOSYN) 3.375 g in iso-osmotic dextrose 50 ml (premix) 3.375 g Once 8/6/2019 8/6/2019    Sig - Route: Infuse 50 mL into a venous catheter 1 (One) Time. - Intravenous    sodium chloride 0.9 % bolus 1,000 mL 1,000 mL Once 8/6/2019 8/6/2019    Sig - Route: Infuse 1,000 mL into a venous catheter 1 (One) Time. - Intravenous    sodium chloride 0.9 % flush 10 mL 10 mL As Needed 8/6/2019     Sig - Route: Infuse 10 mL into a venous catheter As Needed for Line Care. - Intravenous    Cosign for Ordering:  Accepted by James Elias DO on 8/6/2019  1:22 PM            Lab Results (last 24 hours)     Procedure Component Value Units Date/Time    Lactic Acid, Plasma [[id number]]  (Normal) Collected:  08/06/19 1138    Specimen:  Blood Updated:  08/06/19 1212     Lactate 1.6 mmol/L      Comment: Falsely depressed results may occur on samples drawn from patients receiving N-Acetylcysteine (NAC) or Metamizole.       Blood Culture - Blood, Hand, Right [[id number]] Collected:  08/06/19 1135    Specimen:  Blood from Hand, Right Updated:  08/06/19 1207    Blood Culture - Blood, Hand, Left [[id number]] Collected:  08/06/19 1120    Specimen:  Blood from Hand, Left Updated:  08/06/19 1207    [place] Draw [[id number]] Collected:  08/06/19 0724    Specimen:  Blood Updated:  08/06/19 0910    Narrative:       The following orders were created for panel order [place] Draw.  Procedure                               Abnormality         Status                     ---------                               -----------         ------                     Light Blue Top[[id number]]                                   Final result               Green Top (Gel)[[id number]]                                  Final result               Lavender Top[[id number]]                                     Final result               Gold Top - SST[[id number]]                                   Final result               Green Top (No Gel)[[id number]]                                                            Please view results for these tests on the individual orders.    Comprehensive Metabolic Panel [[id number]]  (Abnormal) Collected:  08/06/19 0724    Specimen:  Blood Updated:  08/06/19 0832     Glucose 141 mg/dL      BUN 20 mg/dL      Creatinine 1.00 mg/dL      Sodium 141 mmol/L      Potassium 4.0 mmol/L      Chloride 102 mmol/L      CO2 28.0 mmol/L      Calcium 9.9 mg/dL      Total Protein 7.9 g/dL      Albumin 4.30 g/dL      ALT (SGPT) 14 U/L      AST (SGOT) 18 U/L       Alkaline Phosphatase 80 U/L      Total Bilirubin 0.4 mg/dL      eGFR Non African Amer 79 mL/min/1.73      Globulin 3.6 gm/dL      A/G Ratio 1.2 g/dL      BUN/Creatinine Ratio 20.0     Anion Gap 11.0 mmol/L     Narrative:       GFR Normal >60  Chronic Kidney Disease <60  Kidney Failure <15    Lipase [[id number]]  (Normal) Collected:  08/06/19 0724    Specimen:  Blood Updated:  08/06/19 0831     Lipase 24 U/L     Light Blue Top [[id number]] Collected:  08/06/19 0724    Specimen:  Blood Updated:  08/06/19 0831     Extra Tube hold for add-on     Comment: Auto resulted       Lavender Top [[id number]] Collected:  08/06/19 0724    Specimen:  Blood Updated:  08/06/19 0831     Extra Tube hold for add-on     Comment: Auto resulted       Gold Top - SST [[id number]] Collected:  08/06/19 0724    Specimen:  Blood Updated:  08/06/19 0831     Extra Tube Hold for add-ons.     Comment: Auto resulted.       Green Top (Gel) [[id number]] Collected:  08/06/19 0724    Specimen:  Blood Updated:  08/06/19 0831     Extra Tube Hold for add-ons.     Comment: Auto resulted.       Troponin [[id number]]  (Normal) Collected:  08/06/19 0724    Specimen:  Blood Updated:  08/06/19 0752     Troponin T <0.010 ng/mL     Narrative:       Troponin T Reference Range:  <= 0.03 ng/mL-   Negative for AMI  >0.03 ng/mL-     Abnormal for myocardial necrosis.  Clinicians would have to utilize clinical acumen, EKG, Troponin and serial changes to determine if it is an Acute Myocardial Infarction or myocardial injury due to an underlying chronic condition.     Urinalysis With Microscopic If Indicated (No Culture) - Urine, Clean Catch [[id number]]  (Normal) Collected:  08/06/19 0718    Specimen:  Urine, Clean Catch Updated:  08/06/19 0743     Color, UA Yellow     Appearance, UA Clear     pH, UA 5.5     Specific Gravity, UA 1.021     Glucose, UA Negative     Ketones, UA Negative     Bilirubin, UA Negative     Blood, UA Negative     Protein, UA Negative     Leuk Esterase, UA  Negative     Nitrite, UA Negative     Urobilinogen, UA 0.2 E.U./dL    Narrative:       Urine microscopic not indicated.    CBC & Differential [[id number]] Collected:  08/06/19 0724    Specimen:  Blood Updated:  08/06/19 0735    Narrative:       The following orders were created for panel order CBC & Differential.  Procedure                               Abnormality         Status                     ---------                               -----------         ------                     CBC Auto Differential[[id number]]        Abnormal            Final result                 Please view results for these tests on the individual orders.    CBC Auto Differential [[id number]]  (Abnormal) Collected:  08/06/19 0724    Specimen:  Blood Updated:  08/06/19 0735     WBC 14.47 10*3/mm3      RBC 4.98 10*6/mm3      Hemoglobin 14.4 g/dL      Hematocrit 45.1 %      MCV 90.6 fL      MCH 28.9 pg      MCHC 31.9 g/dL      RDW 13.1 %      RDW-SD 42.9 fl      MPV 11.3 fL      Platelets 278 10*3/mm3      Neutrophil % 87.2 %      Lymphocyte % 6.8 %      Monocyte % 4.8 %      Eosinophil % 0.4 %      Basophil % 0.5 %      Immature Grans % 0.3 %      Neutrophils, Absolute 12.61 10*3/mm3      Lymphocytes, Absolute 0.98 10*3/mm3      Monocytes, Absolute 0.70 10*3/mm3      Eosinophils, Absolute 0.06 10*3/mm3      Basophils, Absolute 0.07 10*3/mm3      Immature Grans, Absolute 0.05 10*3/mm3      nRBC 0.0 /100 WBC         Imaging Results (last 24 hours)     Procedure Component Value Units Date/Time    US Gallbladder [[id number]] Collected:  08/06/19 0840     Updated:  08/06/19 1011    Narrative:       EXAMINATION: US GALLBLADDER-08/06/2019:     INDICATION: RUQ TENDERNESS, N/V.     TECHNIQUE: Ultrasonographic images of the right upper quadrant were  obtained.     COMPARISON: NONE.     FINDINGS: The pancreas is poorly visualized secondary to bowel gas  artifact. Within the limitations, no obvious pancreatic abnormality  identified. The liver demonstrate  a homogeneous echotexture without  evidence of hepatic lesion. No perihepatic fluid identified.     The gallbladder demonstrates echogenic foci with posterior acoustic  shadowing consistent with cholelithiasis. These stones are at the neck  of the gallbladder. Echogenic debris is also noted within the  gallbladder. The ultrasound technologist did note a positive Loo's  sign. Gallbladder wall thickening measuring up to 0.4 cm. No  pericholecystic fluid identified. The common bile duct measures 0.6 cm  which is slightly prominent for 49-year-old male.     The right kidney measures 10.2 x 4.6 x 5.8 cm. No renal mass or  hydronephrosis identified.       Impression:       1.  Distended gallbladder with echogenic gallstones noted within the  neck of the gallbladder and gallbladder wall thickening measuring up to  0.4 cm. These findings may relate to acute cholecystitis in the  appropriate clinical setting.     2.  The common bile duct measures up to 0.6 cm which is slightly  prominent given the patient's stated age.     D:  08/06/2019  E:  08/06/2019          XR Chest 1 View [[id number]] Collected:  08/06/19 0744     Updated:  08/06/19 0907    Narrative:       EXAMINATION: XR CHEST 1 VW- 08/06/2019      INDICATION: Upper Abdominal Pain Triage Protocol      COMPARISON: Chest radiograph 08/04/2018     FINDINGS: Single portable chest radiograph is submitted for review. The  heart is prominent in size, similar to prior exam. Lungs are clear. No  pleural effusion or pneumothorax. Visualized upper abdomen is clear.  Small hiatal hernia suggested. The osseous structures appear intact.       Impression:       1. Low lung volumes without evidence of pneumonia.     2. The heart is prominent in size, similar to prior exam.     3. Small hiatal hernia suggested.     D:  08/06/2019  E:  08/06/2019           Physician Progress Notes (last 24 hours) (Notes from 8/5/2019  3:17 PM through 8/6/2019  3:17 PM)     No notes of this type  exist for this encounter.        Consult Notes (last 24 hours) (Notes from 8/5/2019  3:17 PM through 8/6/2019  3:17 PM)     No notes of this type exist for this encounter.

## 2019-08-06 NOTE — CONSULTS
General Surgery Consultation Note    Date of Service: 8/6/2019    Valente Arndt Jr.  [id number]  1970      Referring Provider: Hao Wilson MD    Location of Consult: [facility]     Reason for Consultation: Acute Cholecystitis       History of Present Illness:  I am seeing, Valente Arndt Jr., in consultation for Hao Wilson MD regarding acute cholecystitis.  The patient is a 49-year-old male with a history of hypertension with resultant stroke and intracranial hemorrhage who is seen in consultation for abdominal pain concerning for acute cholecystitis.  The patient first experienced abdominal pain several weeks ago that spontaneously resolved.  There was an episode of emesis at that time.  The pain recurred last evening prompting the patient's presentation to our emergency department.  The pain is located in the right upper quadrant with radiation to the back.  The patient describes the pain as a throbbing and sharp sensation.  At its most severe, the pain is an 8/10 on the pain scale.  There have been no associated fevers or chills.  There have been several episodes of emesis.  Blood work on presentation demonstrated a leukocytosis of 14K.  Ultrasound demonstrated a distended gallbladder without wall thickening or pericholecystic fluid.  A Loo sign was noted.  General surgery was asked to evaluate the patient given his clinical picture of acute cholecystitis.        Past Medical History:   Diagnosis Date   • Hypertension    • Left Intracranial hemorrhage (CMS/HCC)     pt reports resolution to right-sided hemiparesis    • Stroke (CMS/HCC)        Past Surgical History:   Procedure Laterality Date   • APPENDECTOMY      Open   • FOOT SURGERY     • WISDOM TOOTH EXTRACTION         No Known Allergies    No current facility-administered medications on file prior to encounter.      Current Outpatient Medications on File Prior to Encounter   Medication Sig Dispense Refill   • lisinopril  (PRINIVIL,ZESTRIL) 30 MG tablet Take 1 tablet by mouth Every 12 (Twelve) Hours. 180 tablet 1         Current Facility-Administered Medications:   •  acetaminophen (TYLENOL) tablet 650 mg, 650 mg, Oral, Q4H PRN, Hao Wilson MD  •  HYDROcodone-acetaminophen (NORCO) 5-325 MG per tablet 1 tablet, 1 tablet, Oral, Q4H PRN, Hao Wilson MD, 1 tablet at 08/06/19 1556  •  labetalol (NORMODYNE,TRANDATE) injection 10 mg, 10 mg, Intravenous, Q4H PRN, Hao Wilson MD  •  lisinopril (PRINIVIL,ZESTRIL) tablet 30 mg, 30 mg, Oral, Q12H, Hao Wilson MD  •  morphine injection 2 mg, 2 mg, Intravenous, Q4H PRN **AND** naloxone (NARCAN) injection 0.4 mg, 0.4 mg, Intravenous, Q5 Min PRN, Hao Wilson MD  •  ondansetron (ZOFRAN) injection 4 mg, 4 mg, Intravenous, Q6H PRN, Hao Wilson MD  •  piperacillin-tazobactam (ZOSYN) 3.375 g in iso-osmotic dextrose 50 ml (premix), 3.375 g, Intravenous, Q8H, Hao Wilson MD, 3.375 g at 08/06/19 1730  •  sodium chloride 0.9 % flush 10 mL, 10 mL, Intravenous, PRN, James Elias,   •  sodium chloride 0.9 % flush 3 mL, 3 mL, Intravenous, Q12H, Hao Wilson MD  •  sodium chloride 0.9 % flush 3-10 mL, 3-10 mL, Intravenous, PRN, Hao Wilson MD  •  sodium chloride 0.9 % infusion, 100 mL/hr, Intravenous, Continuous, Hao Wilson MD, Last Rate: 100 mL/hr at 08/06/19 1556, 100 mL/hr at 08/06/19 1556        Family History   Problem Relation Age of Onset   • Obesity Mother    • Hypertension Father    • Kidney disease Father    • Heart murmur Father    • No Known Problems Daughter    • No Known Problems Daughter        Social History     Socioeconomic History   • Marital status: Single     Spouse name: Not on file   • Number of children: Not on file   • Years of education: Not on file   • Highest education level: Not on file   Tobacco Use   • Smoking status: Never Smoker   • Smokeless tobacco: Never Used   Substance and Sexual Activity   • Alcohol use:  "Yes     Comment: rare occasion   • Drug use: No   • Sexual activity: No     Partners: Female   Social History Narrative    Lives with daughter and mother. Works at [employer].        Review of Systems:  Review of Systems   Constitutional: Negative for chills and fever.   HENT: Negative.    Eyes: Negative.    Respiratory: Negative.    Cardiovascular: Negative.    Gastrointestinal: Positive for abdominal pain, nausea and vomiting.   Genitourinary: Negative.    Skin: Negative.    Allergic/Immunologic: Negative.    Neurological: Negative.    Hematological: Negative.    Psychiatric/Behavioral: Negative.        /99 (BP Location: Right arm, Patient Position: Lying)   Pulse 61   Temp 99.2 °F (37.3 °C) (Oral)   Resp 18   Ht 167.6 cm (66\")   Wt 116 kg (255 lb)   SpO2 97%   BMI 41.16 kg/m²   Body mass index is 41.16 kg/m².    General: NAD, AAO x 3   Eyes:  PER, no icterus, and normal sclera.  Ears, Nose, Mouth, & Throat:  Normal appearance of ears.  Nares patent.  Nasal mucosa, septum, and turbinates normal.  Mucous membranes moist without exudate.  Neck supple without adenopathy.   Cardiovascular: Regular rate and rhythm without murmurs, rubs, or gallops.  Palpable pedal pulses.  Extremities warm and well perfused.  No edema.    Respiratory: Clear to auscultation bilaterally without rales, rhonchi, or wheezes.  Gastrointestinal: Soft, tender to palpation in the right upper quadrant, ND.  Neurologic: CN II - XII grossly intact.  No focal neuro deficits.  Skin: No obvious rashes or worrisome lesions noted.      CBC  Results from last 7 days   Lab Units 08/06/19  0724   WBC 10*3/mm3 14.47*   HEMOGLOBIN g/dL 14.4   HEMATOCRIT % 45.1   PLATELETS 10*3/mm3 278       CMP  Results from last 7 days   Lab Units 08/06/19  0724   SODIUM mmol/L 141   POTASSIUM mmol/L 4.0   CHLORIDE mmol/L 102   CO2 mmol/L 28.0   BUN mg/dL 20   CREATININE mg/dL 1.00   CALCIUM mg/dL 9.9   BILIRUBIN mg/dL 0.4   ALK PHOS U/L 80   ALT (SGPT) U/L 14 "   AST (SGOT) U/L 18   GLUCOSE mg/dL 141*       Radiology  Imaging Results (last 72 hours)     Procedure Component Value Units Date/Time    US Gallbladder [[id number]] Collected:  08/06/19 0840     Updated:  08/06/19 1011    Narrative:       EXAMINATION: US GALLBLADDER-08/06/2019:     INDICATION: RUQ TENDERNESS, N/V.     TECHNIQUE: Ultrasonographic images of the right upper quadrant were  obtained.     COMPARISON: NONE.     FINDINGS: The pancreas is poorly visualized secondary to bowel gas  artifact. Within the limitations, no obvious pancreatic abnormality  identified. The liver demonstrate a homogeneous echotexture without  evidence of hepatic lesion. No perihepatic fluid identified.     The gallbladder demonstrates echogenic foci with posterior acoustic  shadowing consistent with cholelithiasis. These stones are at the neck  of the gallbladder. Echogenic debris is also noted within the  gallbladder. The ultrasound technologist did note a positive Loo's  sign. Gallbladder wall thickening measuring up to 0.4 cm. No  pericholecystic fluid identified. The common bile duct measures 0.6 cm  which is slightly prominent for 49-year-old male.     The right kidney measures 10.2 x 4.6 x 5.8 cm. No renal mass or  hydronephrosis identified.       Impression:       1.  Distended gallbladder with echogenic gallstones noted within the  neck of the gallbladder and gallbladder wall thickening measuring up to  0.4 cm. These findings may relate to acute cholecystitis in the  appropriate clinical setting.     2.  The common bile duct measures up to 0.6 cm which is slightly  prominent given the patient's stated age.     D:  08/06/2019  E:  08/06/2019          XR Chest 1 View [[id number]] Collected:  08/06/19 0744     Updated:  08/06/19 0907    Narrative:       EXAMINATION: XR CHEST 1 VW- 08/06/2019      INDICATION: Upper Abdominal Pain Triage Protocol      COMPARISON: Chest radiograph 08/04/2018     FINDINGS: Single portable chest  radiograph is submitted for review. The  heart is prominent in size, similar to prior exam. Lungs are clear. No  pleural effusion or pneumothorax. Visualized upper abdomen is clear.  Small hiatal hernia suggested. The osseous structures appear intact.       Impression:       1. Low lung volumes without evidence of pneumonia.     2. The heart is prominent in size, similar to prior exam.     3. Small hiatal hernia suggested.     D:  08/06/2019  E:  08/06/2019               Assessment:  Mr. Arndt is a 49-year-old male with a history of stroke and intracranial bleed secondary to uncontrolled hypertension who presented to the emergency department with a one day history of right upper quadrant abdominal pain.  Blood work demonstrated a leukocytosis while ultrasound demonstrated a gallbladder that was distended with a positive Loo sign.  The patient's physical examination demonstrates tenderness in the right upper quadrant.  His clinical picture is indeed consistent with acute cholecystitis.  I have recommended cholecystectomy for treatment.  The patient will be scheduled for the procedure tomorrow.  He may have clear liquids tonight with nothing to eat or drink after midnight in preparation for surgery.    Plan:  - Clear liquids with NPO after MN and IVF  - OR tomorrow for laparoscopic cholecystectomy  - Continue IV antibiotics      Tawanna Hemphill MD  08/06/19  7:25 PM

## 2019-08-06 NOTE — H&P
"    [facility] Medicine Services  HISTORY AND PHYSICAL    Patient Name: Valente Arndt Jr.  : 1970  MRN: [id number]  Primary Care Physician: Raoul Anthony DO  Date of admission: 2019      Subjective   Subjective     Chief Complaint:  Abdominal pain    HPI:   Mr. Arndt is a 49-year-old male with past history of hypertension, left basal ganglia ICH (nonaneurysmal) 2018, and morbid obesity who presented to the [facility] ER this morning for evaluation of acute, \"throbbing,\" 8/10 right upper quadrant abdominal pain, radiating occasionally to right shoulder that woke him from sleep at 1AM with associated chills and nausea and yellow to clear emesis x 5. Pain was aggravated by nothing, alleviated by IV morphine in ER. Had similar episode 1 week ago that resolved after he vomited. No prior hx of gallbladder disease. Hx of open appendectomy in the remote past.  No fevers, chest pain, palpitations, diarrhea, melena, or hematochezia.     Abdominal pain was 5/10 during my evaluation. Nausea had resolved with PRN meds in the ER.     ER evaluation confirmed acute cholecystitis and pt was admitted for evaluation and management. Dr. Hemphill agreed to see the patient in surgical consultation.     Review of Systems   Otherwise comprehensive ROS reviewed and is negative except as mentioned in the HPI.    Personal History     Past Medical History:   Diagnosis Date   • Hypertension    • Left Intracranial hemorrhage (CMS/HCC)     pt reports resolution to right-sided hemiparesis    • Stroke (CMS/HCC)        Past Surgical History:   Procedure Laterality Date   • APPENDECTOMY      Open   • FOOT SURGERY     • WISDOM TOOTH EXTRACTION         Family History: family history includes Heart murmur in his father; Hypertension in his father; Kidney disease in his father; No Known Problems in his daughter and daughter; Obesity in his mother. Otherwise pertinent FHx was reviewed and unremarkable. "     Social History:  reports that he has never smoked. He has never used smokeless tobacco. He reports that he drinks alcohol. He reports that he does not use drugs.  Social History     Social History Narrative    Lives with daughter and mother. Works at [employer].        Medications:    Available home medication information reviewed.    Current Outpatient Medications:   •  lisinopril (PRINIVIL,ZESTRIL) 30 MG tablet, Take 1 tablet by mouth Every 12 (Twelve) Hours., Disp: 180 tablet, Rfl: 1    No Known Allergies    Objective   Objective     Vital Signs:   Temp:  [98.5 °F (36.9 °C)-98.6 °F (37 °C)] 98.5 °F (36.9 °C)  Heart Rate:  [53-65] 55  Resp:  [16] 16  BP: (159-188)/() 174/97      Physical Exam   Constitutional: Awake, alert, mild distress  Eyes: PERRLA, sclerae anicteric, no conjunctival injection  HENT: NCAT, mucous membranes dry  Neck: Supple, no thyromegaly, no lymphadenopathy, trachea midline  Respiratory: Clear to auscultation bilaterally, nonlabored respirations   Cardiovascular: RRR, no murmurs, rubs, or gallops, palpable pedal pulses bilaterally  Gastrointestinal: Positive bowel sounds, soft, moderate RUQ tenderness to palpation with positive sanchez's sign, nondistended  Musculoskeletal: No bilateral ankle edema, no clubbing or cyanosis to extremities  Psychiatric: Flat affect, cooperative  Neurologic: Oriented x 3, strength symmetric in all extremities (no obvious right-sided weakness), Cranial Nerves grossly intact to confrontation, speech clear  Skin: No rashes    Results Reviewed:  I have personally reviewed current lab, radiology, and data and agree.    Results from last 7 days   Lab Units 08/06/19  0724   WBC 10*3/mm3 14.47*   HEMOGLOBIN g/dL 14.4   HEMATOCRIT % 45.1   PLATELETS 10*3/mm3 278     Results from last 7 days   Lab Units 08/06/19  1138 08/06/19  0724   SODIUM mmol/L  --  141   POTASSIUM mmol/L  --  4.0   CHLORIDE mmol/L  --  102   CO2 mmol/L  --  28.0   BUN mg/dL  --  20   CREATININE  mg/dL  --  1.00   GLUCOSE mg/dL  --  141*   CALCIUM mg/dL  --  9.9   ALT (SGPT) U/L  --  14   AST (SGOT) U/L  --  18   TROPONIN T ng/mL  --  <0.010   LACTATE mmol/L 1.6  --      Estimated Creatinine Clearance: 107.1 mL/min (by C-G formula based on SCr of 1 mg/dL).  Brief Urine Lab Results  (Last result in the past 365 days)      Color   Clarity   Blood   Leuk Est   Nitrite   Protein   CREAT   Urine HCG        08/06/19 0718 Yellow Clear Negative Negative Negative Negative             Imaging Results (last 24 hours)     Procedure Component Value Units Date/Time    US Gallbladder [[id number]] Collected:  08/06/19 0840     Updated:  08/06/19 1011    Narrative:       EXAMINATION: US GALLBLADDER-08/06/2019:     INDICATION: RUQ TENDERNESS, N/V.     TECHNIQUE: Ultrasonographic images of the right upper quadrant were  obtained.     COMPARISON: NONE.     FINDINGS: The pancreas is poorly visualized secondary to bowel gas  artifact. Within the limitations, no obvious pancreatic abnormality  identified. The liver demonstrate a homogeneous echotexture without  evidence of hepatic lesion. No perihepatic fluid identified.     The gallbladder demonstrates echogenic foci with posterior acoustic  shadowing consistent with cholelithiasis. These stones are at the neck  of the gallbladder. Echogenic debris is also noted within the  gallbladder. The ultrasound technologist did note a positive Loo's  sign. Gallbladder wall thickening measuring up to 0.4 cm. No  pericholecystic fluid identified. The common bile duct measures 0.6 cm  which is slightly prominent for 49-year-old male.     The right kidney measures 10.2 x 4.6 x 5.8 cm. No renal mass or  hydronephrosis identified.       Impression:       1.  Distended gallbladder with echogenic gallstones noted within the  neck of the gallbladder and gallbladder wall thickening measuring up to  0.4 cm. These findings may relate to acute cholecystitis in the  appropriate clinical setting.      2.  The common bile duct measures up to 0.6 cm which is slightly  prominent given the patient's stated age.     D:  08/06/2019  E:  08/06/2019          XR Chest 1 View [[id number]] Collected:  08/06/19 0744     Updated:  08/06/19 0907    Narrative:       EXAMINATION: XR CHEST 1 VW- 08/06/2019      INDICATION: Upper Abdominal Pain Triage Protocol      COMPARISON: Chest radiograph 08/04/2018     FINDINGS: Single portable chest radiograph is submitted for review. The  heart is prominent in size, similar to prior exam. Lungs are clear. No  pleural effusion or pneumothorax. Visualized upper abdomen is clear.  Small hiatal hernia suggested. The osseous structures appear intact.       Impression:       1. Low lung volumes without evidence of pneumonia.     2. The heart is prominent in size, similar to prior exam.     3. Small hiatal hernia suggested.     D:  08/06/2019  E:  08/06/2019                CXR personally reviewed, agree with official interp  ECG reviewed, sinus rhythm with 1st degree block, no significant abnormalities     Assessment/Plan   Assessment / Plan      Mr. Arndt is a 49-year-old male with past history of hypertension, left basal ganglia ICH (nonaneurysmal) 8/2018, and morbid obesity who presented to the [facility] Er today with acute onset of sharp, RUQ abdominal pain with associated nausea and bilious vomiting. Evaluation in the ER ruled in calculous cholecystitis.     Active Hospital Problems    Diagnosis POA   • **Acute calculous cholecystitis [K80.00] Yes   • Morbid obesity (CMS/HCC) [E66.01] Yes   • Essential hypertension [I10] Yes     Acute Calculous Cholecystitis   Prominent CBD without hyperbilirubinemia  - GB Ultrasound with typical findings. No clear evidence of choledocholithiasis and labs normal    - IV zosyn, IVF, IV morphine and lortab prn, IV antiemetics   - Dr. Hemphill consulted by ER for consideration of CCY  - Repeat CBC and CMP in AM     Uncontrolled Hypertension   - continue  home lisinopril   - prn IV labetalol     History of Nonaneurysmal Left Basal Ganglia Intracranial Hemorrhage without residual deficits  Morbid Obesity    DVT prophylaxis:  Mechanical given anticipated surgery   CODE STATUS:    Code Status and Medical Interventions:   Ordered at: 08/06/19 1237     Code Status:    CPR     Medical Interventions (Level of Support Prior to Arrest):    Full       Admission Status:  I believe this patient meets inpatient status due to anticipated need for surgery this admission in the setting of acute calculous cholecystitis.     Electronically signed by Hao Wilson MD, 08/06/19, 12:31 PM.

## 2019-08-06 NOTE — PROGRESS NOTES
Discharge Planning Assessment  [facility]     Patient Name: Valente Arndt Jr.  MRN: [id number]  Today's Date: 8/6/2019    Admit Date: 8/6/2019    Discharge Needs Assessment     Row Name 08/06/19 1024       Living Environment    Lives With  child(smith), dependent;parent(s) pt resides in [facility]    Name(s) of Who Lives With Patient  daughter Aj (17y.o), mother    Current Living Arrangements  home/apartment/condo    Primary Care Provided by  self    Provides Primary Care For  child(smith)    Family Caregiver if Needed  parent(s)    Quality of Family Relationships  helpful;involved;supportive    Able to Return to Prior Arrangements  yes       Resource/Environmental Concerns    Resource/Environmental Concerns  none       Transition Planning    Patient/Family Anticipates Transition to  home with family    Patient/Family Anticipated Services at Transition  none    Transportation Anticipated  family or friend will provide       Discharge Needs Assessment    Readmission Within the Last 30 Days  no previous admission in last 30 days    Concerns to be Addressed  discharge planning    Equipment Currently Used at Home  none    Anticipated Changes Related to Illness  none    Equipment Needed After Discharge  none        Discharge Plan     Row Name 08/06/19 1025       Plan    Plan  home    Patient/Family in Agreement with Plan  yes    Plan Comments  CM spoke with pt and daughter Aj at bedside. Pt resides in [facility] and 17y.o daughter. Pt is independent of adl's and denies use of dme and is not current with home health or other outpt services. Pt plans to return home and denies needs at this time. CM will continue to follow.     Final Discharge Disposition Code  01 - home or self-care        Destination      No service coordination in this encounter.      Durable Medical Equipment      No service coordination in this encounter.      Dialysis/Infusion      No service coordination in this  encounter.      Home Medical Care      No service coordination in this encounter.      Therapy      No service coordination in this encounter.      Community Resources      No service coordination in this encounter.          Demographic Summary     Row Name 08/06/19 0989       General Information    Referral Source  admission list    Reason for Consult  discharge planning    Preferred Language  English    General Information Comments  PCP- Raoul Anthony       Contact Information    Permission Granted to Share Info With      Contact Information Comments  [phone]        Functional Status     Row Name 08/06/19 1023       Functional Status    Usual Activity Tolerance  good    Current Activity Tolerance  moderate       Functional Status, IADL    Medications  independent    Meal Preparation  independent    Housekeeping  independent    Laundry  independent    Shopping  independent       Employment/    Employment/ Comments  pt confirms he has [employer] and denies concerns or disruption in coverage. Pt repots he has prescription drug coverage and denies issues obtaining or affording current medications.         Psychosocial    No documentation.       Abuse/Neglect    No documentation.       Legal    No documentation.       Substance Abuse    No documentation.       Patient Forms    No documentation.           Lita Flower

## 2019-08-06 NOTE — ED PROVIDER NOTES
Subjective   Mr. Valente Arndt Jr. is a 49 y.o. male who presents to the ED with c/o abdominal pain. He reports at 0100 today hedeveloped sharp right upper quadrant abdominal pain with N/V. He notes nothing alleviates or worsens the pain. He also complains of nonbilious nausea and vomiting. He describes the vomit as yellow. He denies diarrhea, blood in stool, changes in urination, shortness of breath, cough, coughing up blood, and unilateral leg pain. He has a history of ICH and hypertension. He denies a history of hyperlipidemia and DVT/PE. He also has a history of appendectomy and denies a history of cholecystectomy. His grandfather had a history of CAD. He has had no surgery or travel in the past 2 months. He is a nonsmoker and does not use alcohol or illegal drugs. There are no other acute complaints at this time.        History provided by:  Patient  Abdominal Pain   Pain location:  RUQ  Pain quality: gnawing and sharp    Pain radiates to:  Does not radiate  Pain severity:  Moderate  Onset quality:  Sudden  Duration:  7 hours  Timing:  Constant  Progression:  Worsening  Chronicity:  Recurrent  Context: not alcohol use    Relieved by:  None tried  Worsened by:  Nothing  Ineffective treatments:  None tried  Associated symptoms: nausea and vomiting    Associated symptoms: no chills, no cough, no diarrhea, no dysuria, no fever and no shortness of breath    Risk factors: obesity    Risk factors: not elderly        Review of Systems   Constitutional: Negative for chills and fever.   Respiratory: Negative for cough and shortness of breath.         Negative for coughing up blood.   Gastrointestinal: Positive for abdominal pain, nausea and vomiting. Negative for blood in stool and diarrhea.   Genitourinary: Negative for difficulty urinating, dysuria, frequency and urgency.   Musculoskeletal:        Negative for unilateral leg pain.   All other systems reviewed and are negative.      Past Medical History:    Diagnosis Date   • Hypertension    • Intracranial hemorrhage (CMS/HCC)    • Stroke (CMS/HCC)        No Known Allergies    Past Surgical History:   Procedure Laterality Date   • APPENDECTOMY     • FOOT SURGERY     • WISDOM TOOTH EXTRACTION         Family History   Problem Relation Age of Onset   • Obesity Mother    • Hypertension Father    • Kidney disease Father    • Heart murmur Father    • No Known Problems Daughter    • No Known Problems Daughter        Social History     Socioeconomic History   • Marital status: Single     Spouse name: Not on file   • Number of children: Not on file   • Years of education: Not on file   • Highest education level: Not on file   Tobacco Use   • Smoking status: Never Smoker   • Smokeless tobacco: Never Used   Substance and Sexual Activity   • Alcohol use: Yes     Comment: rare occasion   • Drug use: No   • Sexual activity: No     Partners: Female         Objective   Physical Exam   Constitutional: He is oriented to person, place, and time. He appears well-developed and well-nourished.   HENT:   Head: Normocephalic and atraumatic.   Nose: Nose normal.   Eyes: Conjunctivae are normal. No scleral icterus.   Neck: Normal range of motion. Neck supple.   Cardiovascular: Normal rate, regular rhythm and normal heart sounds. Exam reveals no gallop and no friction rub.   Pulmonary/Chest: Effort normal and breath sounds normal. No respiratory distress. He has no wheezes. He has no rales.   Abdominal: Soft. Bowel sounds are normal. There is tenderness in the right upper quadrant. There is no rigidity, no rebound and no guarding.   Tenderness to the right upper quadrant.   Musculoskeletal: Normal range of motion.   Neurological: He is alert and oriented to person, place, and time.   Skin: Skin is warm and dry.   Psychiatric: He has a normal mood and affect. His behavior is normal.   Nursing note and vitals reviewed.      Procedures         ED Course  ED Course as of Aug 06 1017   Tue Aug 06,  2019 0747 Nitrite, UA: Negative [WT]   0907 WBC: (!) 14.47 [WT]   0913 Gallbladder u/s:     Impression        Distended gallbladder with echogenic gallstones noted within the neck of  the gallbladder and gallbladder wall thickening measuring up to 0.4 cm.  These findings may relate to acute cholecystitis in the appropriate  clinical setting.     The common bile duct measures up to 0.6 cm which is slightly prominent  given the patient's stated age.      [WT]   0913 Dr. Hemphill paged via [employer] for acute calculous cholecystitis   [WT]   1007 Neutrophils Absolute: (!) 12.61 [WT]   1007 EKG sinus bradycardia rate 53  [WT]   1016 Lipase: 24 [WT]   1016 Troponin T: <0.010 [WT]   1016 Glucose: (!) 141 [WT]   1016 @RESULTRCNT(24h)@  Note: In addition to lab results from this visit, the labs listed above may include labs taken at another facility or during a different encounter within the last 24 hours. Please correlate lab times with ED admission and discharge times for further clarification of the services performed during this visit.    [unfilled]  [unfilled]  [unfilled]  ECG/EMG Results (last 24 hours)     Procedure Component Value Units Date/Time    ECG 12 Lead [[id number]] Collected:  08/06/19 0741     Updated:  08/06/19 0746    ECG 12 Lead [[id number]] Collected:  08/06/19 0716     Updated:  08/06/19 0748      [unfilled]        [WT]      ED Course User Index  [WT] Lyubov Acevedo, NIMO       Recent Results (from the past 24 hour(s))   Urinalysis With Microscopic If Indicated (No Culture) - Urine, Clean Catch    Collection Time: 08/06/19  7:18 AM   Result Value Ref Range    Color, UA Yellow Yellow, Straw    Appearance, UA Clear Clear    pH, UA 5.5 5.0 - 8.0    Specific Gravity, UA 1.021 1.001 - 1.030    Glucose, UA Negative Negative    Ketones, UA Negative Negative    Bilirubin, UA Negative Negative    Blood, UA Negative Negative    Protein, UA Negative Negative    Leuk Esterase, UA Negative Negative     Nitrite, UA Negative Negative    Urobilinogen, UA 0.2 E.U./dL 0.2 - 1.0 E.U./dL   Comprehensive Metabolic Panel    Collection Time: 08/06/19  7:24 AM   Result Value Ref Range    Glucose 141 (H) 65 - 99 mg/dL    BUN 20 6 - 20 mg/dL    Creatinine 1.00 0.76 - 1.27 mg/dL    Sodium 141 136 - 145 mmol/L    Potassium 4.0 3.5 - 5.2 mmol/L    Chloride 102 98 - 107 mmol/L    CO2 28.0 22.0 - 29.0 mmol/L    Calcium 9.9 8.6 - 10.5 mg/dL    Total Protein 7.9 6.0 - 8.5 g/dL    Albumin 4.30 3.50 - 5.20 g/dL    ALT (SGPT) 14 1 - 41 U/L    AST (SGOT) 18 1 - 40 U/L    Alkaline Phosphatase 80 39 - 117 U/L    Total Bilirubin 0.4 0.2 - 1.2 mg/dL    eGFR Non African Amer 79 >60 mL/min/1.73    Globulin 3.6 gm/dL    A/G Ratio 1.2 g/dL    BUN/Creatinine Ratio 20.0 7.0 - 25.0    Anion Gap 11.0 5.0 - 15.0 mmol/L   Lipase    Collection Time: 08/06/19  7:24 AM   Result Value Ref Range    Lipase 24 13 - 60 U/L   Troponin    Collection Time: 08/06/19  7:24 AM   Result Value Ref Range    Troponin T <0.010 0.000 - 0.030 ng/mL   Light Blue Top    Collection Time: 08/06/19  7:24 AM   Result Value Ref Range    Extra Tube hold for add-on    Green Top (Gel)    Collection Time: 08/06/19  7:24 AM   Result Value Ref Range    Extra Tube Hold for add-ons.    Lavender Top    Collection Time: 08/06/19  7:24 AM   Result Value Ref Range    Extra Tube hold for add-on    Gold Top - SST    Collection Time: 08/06/19  7:24 AM   Result Value Ref Range    Extra Tube Hold for add-ons.    CBC Auto Differential    Collection Time: 08/06/19  7:24 AM   Result Value Ref Range    WBC 14.47 (H) 3.40 - 10.80 10*3/mm3    RBC 4.98 4.14 - 5.80 10*6/mm3    Hemoglobin 14.4 13.0 - 17.7 g/dL    Hematocrit 45.1 37.5 - 51.0 %    MCV 90.6 79.0 - 97.0 fL    MCH 28.9 26.6 - 33.0 pg    MCHC 31.9 31.5 - 35.7 g/dL    RDW 13.1 12.3 - 15.4 %    RDW-SD 42.9 37.0 - 54.0 fl    MPV 11.3 6.0 - 12.0 fL    Platelets 278 140 - 450 10*3/mm3    Neutrophil % 87.2 (H) 42.7 - 76.0 %    Lymphocyte % 6.8 (L)  19.6 - 45.3 %    Monocyte % 4.8 (L) 5.0 - 12.0 %    Eosinophil % 0.4 0.3 - 6.2 %    Basophil % 0.5 0.0 - 1.5 %    Immature Grans % 0.3 0.0 - 0.5 %    Neutrophils, Absolute 12.61 (H) 1.70 - 7.00 10*3/mm3    Lymphocytes, Absolute 0.98 0.70 - 3.10 10*3/mm3    Monocytes, Absolute 0.70 0.10 - 0.90 10*3/mm3    Eosinophils, Absolute 0.06 0.00 - 0.40 10*3/mm3    Basophils, Absolute 0.07 0.00 - 0.20 10*3/mm3    Immature Grans, Absolute 0.05 0.00 - 0.05 10*3/mm3    nRBC 0.0 0.0 - 0.2 /100 WBC     Note: In addition to lab results from this visit, the labs listed above may include labs taken at another facility or during a different encounter within the last 24 hours. Please correlate lab times with ED admission and discharge times for further clarification of the services performed during this visit.    US Gallbladder   Preliminary Result   1.  Distended gallbladder with echogenic gallstones noted within the   neck of the gallbladder and gallbladder wall thickening measuring up to   0.4 cm. These findings may relate to acute cholecystitis in the   appropriate clinical setting.       2.  The common bile duct measures up to 0.6 cm which is slightly   prominent given the patient's stated age.       D:  08/06/2019   E:  08/06/2019              XR Chest 1 View   Preliminary Result   1. Low lung volumes without evidence of pneumonia.       2. The heart is prominent in size, similar to prior exam.       3. Small hiatal hernia suggested.       D:  08/06/2019   E:  08/06/2019            Vitals:    08/06/19 0730 08/06/19 0731 08/06/19 0900 08/06/19 0914   BP: (!) 160/102  (!) 172/103    BP Location:       Patient Position:       Pulse:  64  61   Resp:       Temp:       TempSrc:       SpO2:  99%  94%   Weight:       Height:         Medications   sodium chloride 0.9 % flush 10 mL (not administered)   piperacillin-tazobactam (ZOSYN) 3.375 g in iso-osmotic dextrose 50 ml (premix) (not administered)   sodium chloride 0.9 % bolus 1,000 mL  (1,000 mL Intravenous New Bag 8/6/19 1145)   ondansetron (ZOFRAN) injection 4 mg (4 mg Intravenous Given 8/6/19 0753)   Morphine sulfate (PF) injection 4 mg (4 mg Intravenous Given 8/6/19 0754)     ECG/EMG Results (last 24 hours)     Procedure Component Value Units Date/Time    ECG 12 Lead [[id number]] Collected:  08/06/19 0716     Updated:  08/06/19 0718        ECG 12 Lead         ECG 12 Lead                           MDM  Number of Diagnoses or Management Options  Acute calculous cholecystitis:   Leukocytosis, unspecified type:   Nausea and vomiting, intractability of vomiting not specified, unspecified vomiting type:   Diagnosis management comments: Patient presents complaining of epigastric pain with nausea and vomiting.  Differential diagnosis includes cholelithiasis, cholecystitis, gastritis, gastroenteritis, pancreatitis, ACS.  Plan to obtain CBC, CMP, lipase, urinalysis, EKG, troponin, gallbladder ultrasound, chest x-ray.  Will administer IV fluids, morphine, Zofran and reassess. Patient has acute calculous cholecystitis on gallbladder u/s. He has normal LFT's and lipase. He does have some leukocytosis.  Negative troponin and no ST elevation on EKG.  General surgery, Dr. Hemphill consulted and advised admission to medicine and zosyn. Blood cultures and lactate pending. Dr. Wilson will admit the patient. Patient stable and agreeable to plan.       Final diagnoses:   Acute calculous cholecystitis   Nausea and vomiting, intractability of vomiting not specified, unspecified vomiting type   Leukocytosis, unspecified type       Documentation assistance provided by dean Young.  Information recorded by the dean was done at my direction and has been verified and validated by me.     Delphine Young  08/06/19 0801       Lyubov Acevedo PA-C  08/06/19 1017

## 2019-08-06 NOTE — PLAN OF CARE
Problem: Patient Care Overview  Goal: Plan of Care Review  Outcome: Ongoing (interventions implemented as appropriate)   08/06/19 0916   Coping/Psychosocial   Plan of Care Reviewed With patient   Plan of Care Review   Progress no change   OTHER   Outcome Summary patient to go to surgery in the morning

## 2019-08-07 ENCOUNTER — ANESTHESIA (OUTPATIENT)
Dept: PERIOP | Facility: HOSPITAL | Age: 49
End: 2019-08-07

## 2019-08-07 ENCOUNTER — ANESTHESIA EVENT (OUTPATIENT)
Dept: PERIOP | Facility: HOSPITAL | Age: 49
End: 2019-08-07

## 2019-08-07 PROBLEM — K80.00 ACUTE CALCULOUS CHOLECYSTITIS: Status: RESOLVED | Noted: 2019-08-06 | Resolved: 2019-08-07

## 2019-08-07 LAB
ALBUMIN SERPL-MCNC: 3.5 G/DL (ref 3.5–5.2)
ALBUMIN/GLOB SERPL: 1 G/DL
ALP SERPL-CCNC: 85 U/L (ref 39–117)
ALT SERPL W P-5'-P-CCNC: 107 U/L (ref 1–41)
ANION GAP SERPL CALCULATED.3IONS-SCNC: 13 MMOL/L (ref 5–15)
AST SERPL-CCNC: 67 U/L (ref 1–40)
BILIRUB SERPL-MCNC: 0.9 MG/DL (ref 0.2–1.2)
BUN BLD-MCNC: 12 MG/DL (ref 6–20)
BUN/CREAT SERPL: 11.7 (ref 7–25)
CALCIUM SPEC-SCNC: 9.1 MG/DL (ref 8.6–10.5)
CHLORIDE SERPL-SCNC: 100 MMOL/L (ref 98–107)
CO2 SERPL-SCNC: 26 MMOL/L (ref 22–29)
CREAT BLD-MCNC: 1.03 MG/DL (ref 0.76–1.27)
DEPRECATED RDW RBC AUTO: 43 FL (ref 37–54)
ERYTHROCYTE [DISTWIDTH] IN BLOOD BY AUTOMATED COUNT: 13.1 % (ref 12.3–15.4)
GFR SERPL CREATININE-BSD FRML MDRD: 77 ML/MIN/1.73
GLOBULIN UR ELPH-MCNC: 3.4 GM/DL
GLUCOSE BLD-MCNC: 144 MG/DL (ref 65–99)
HCT VFR BLD AUTO: 45.6 % (ref 37.5–51)
HGB BLD-MCNC: 14.5 G/DL (ref 13–17.7)
MCH RBC QN AUTO: 28.8 PG (ref 26.6–33)
MCHC RBC AUTO-ENTMCNC: 31.8 G/DL (ref 31.5–35.7)
MCV RBC AUTO: 90.5 FL (ref 79–97)
PLATELET # BLD AUTO: 257 10*3/MM3 (ref 140–450)
PMV BLD AUTO: 11.7 FL (ref 6–12)
POTASSIUM BLD-SCNC: 4 MMOL/L (ref 3.5–5.2)
PROT SERPL-MCNC: 6.9 G/DL (ref 6–8.5)
RBC # BLD AUTO: 5.04 10*6/MM3 (ref 4.14–5.8)
SODIUM BLD-SCNC: 139 MMOL/L (ref 136–145)
WBC NRBC COR # BLD: 22.72 10*3/MM3 (ref 3.4–10.8)

## 2019-08-07 PROCEDURE — 85027 COMPLETE CBC AUTOMATED: CPT | Performed by: HOSPITALIST

## 2019-08-07 PROCEDURE — 80053 COMPREHEN METABOLIC PANEL: CPT | Performed by: HOSPITALIST

## 2019-08-07 PROCEDURE — 25010000002 DEXAMETHASONE PER 1 MG: Performed by: NURSE ANESTHETIST, CERTIFIED REGISTERED

## 2019-08-07 PROCEDURE — 25010000002 PROPOFOL 10 MG/ML EMULSION: Performed by: NURSE ANESTHETIST, CERTIFIED REGISTERED

## 2019-08-07 PROCEDURE — 94799 UNLISTED PULMONARY SVC/PX: CPT

## 2019-08-07 PROCEDURE — 25010000002 FENTANYL CITRATE (PF) 100 MCG/2ML SOLUTION: Performed by: NURSE ANESTHETIST, CERTIFIED REGISTERED

## 2019-08-07 PROCEDURE — 25010000002 PHENYLEPHRINE PER 1 ML: Performed by: NURSE ANESTHETIST, CERTIFIED REGISTERED

## 2019-08-07 PROCEDURE — 25010000002 ONDANSETRON PER 1 MG: Performed by: NURSE ANESTHETIST, CERTIFIED REGISTERED

## 2019-08-07 PROCEDURE — 88304 TISSUE EXAM BY PATHOLOGIST: CPT | Performed by: SURGERY

## 2019-08-07 PROCEDURE — 99233 SBSQ HOSP IP/OBS HIGH 50: CPT | Performed by: INTERNAL MEDICINE

## 2019-08-07 PROCEDURE — 0FT44ZZ RESECTION OF GALLBLADDER, PERCUTANEOUS ENDOSCOPIC APPROACH: ICD-10-PCS | Performed by: SURGERY

## 2019-08-07 PROCEDURE — 25010000002 PIPERACILLIN SOD-TAZOBACTAM PER 1 G: Performed by: HOSPITALIST

## 2019-08-07 RX ORDER — DEXAMETHASONE SODIUM PHOSPHATE 4 MG/ML
INJECTION, SOLUTION INTRA-ARTICULAR; INTRALESIONAL; INTRAMUSCULAR; INTRAVENOUS; SOFT TISSUE AS NEEDED
Status: DISCONTINUED | OUTPATIENT
Start: 2019-08-07 | End: 2019-08-07 | Stop reason: SURG

## 2019-08-07 RX ORDER — FENTANYL CITRATE 50 UG/ML
50 INJECTION, SOLUTION INTRAMUSCULAR; INTRAVENOUS
Status: DISCONTINUED | OUTPATIENT
Start: 2019-08-07 | End: 2019-08-07 | Stop reason: HOSPADM

## 2019-08-07 RX ORDER — PROPOFOL 10 MG/ML
VIAL (ML) INTRAVENOUS AS NEEDED
Status: DISCONTINUED | OUTPATIENT
Start: 2019-08-07 | End: 2019-08-07 | Stop reason: SURG

## 2019-08-07 RX ORDER — ONDANSETRON 2 MG/ML
INJECTION INTRAMUSCULAR; INTRAVENOUS AS NEEDED
Status: DISCONTINUED | OUTPATIENT
Start: 2019-08-07 | End: 2019-08-07 | Stop reason: SURG

## 2019-08-07 RX ORDER — PROMETHAZINE HYDROCHLORIDE 25 MG/1
25 SUPPOSITORY RECTAL ONCE AS NEEDED
Status: DISCONTINUED | OUTPATIENT
Start: 2019-08-07 | End: 2019-08-07 | Stop reason: HOSPADM

## 2019-08-07 RX ORDER — FAMOTIDINE 20 MG/1
20 TABLET, FILM COATED ORAL
Status: COMPLETED | OUTPATIENT
Start: 2019-08-07 | End: 2019-08-07

## 2019-08-07 RX ORDER — LIDOCAINE HYDROCHLORIDE 10 MG/ML
INJECTION, SOLUTION EPIDURAL; INFILTRATION; INTRACAUDAL; PERINEURAL AS NEEDED
Status: DISCONTINUED | OUTPATIENT
Start: 2019-08-07 | End: 2019-08-07 | Stop reason: SURG

## 2019-08-07 RX ORDER — SODIUM CHLORIDE, SODIUM LACTATE, POTASSIUM CHLORIDE, CALCIUM CHLORIDE 600; 310; 30; 20 MG/100ML; MG/100ML; MG/100ML; MG/100ML
9 INJECTION, SOLUTION INTRAVENOUS CONTINUOUS PRN
Status: DISCONTINUED | OUTPATIENT
Start: 2019-08-07 | End: 2019-08-09 | Stop reason: HOSPADM

## 2019-08-07 RX ORDER — PROMETHAZINE HYDROCHLORIDE 25 MG/ML
12.5 INJECTION, SOLUTION INTRAMUSCULAR; INTRAVENOUS ONCE AS NEEDED
Status: DISCONTINUED | OUTPATIENT
Start: 2019-08-07 | End: 2019-08-07 | Stop reason: HOSPADM

## 2019-08-07 RX ORDER — LABETALOL HYDROCHLORIDE 5 MG/ML
5 INJECTION, SOLUTION INTRAVENOUS
Status: DISCONTINUED | OUTPATIENT
Start: 2019-08-07 | End: 2019-08-07 | Stop reason: HOSPADM

## 2019-08-07 RX ORDER — FENTANYL CITRATE 50 UG/ML
INJECTION, SOLUTION INTRAMUSCULAR; INTRAVENOUS AS NEEDED
Status: DISCONTINUED | OUTPATIENT
Start: 2019-08-07 | End: 2019-08-07 | Stop reason: SURG

## 2019-08-07 RX ORDER — SODIUM CHLORIDE 9 MG/ML
100 INJECTION, SOLUTION INTRAVENOUS CONTINUOUS
Status: DISCONTINUED | OUTPATIENT
Start: 2019-08-07 | End: 2019-08-08

## 2019-08-07 RX ORDER — OXYCODONE HYDROCHLORIDE AND ACETAMINOPHEN 5; 325 MG/1; MG/1
2 TABLET ORAL EVERY 6 HOURS PRN
Status: DISCONTINUED | OUTPATIENT
Start: 2019-08-07 | End: 2019-08-09 | Stop reason: HOSPADM

## 2019-08-07 RX ORDER — SODIUM CHLORIDE 9 MG/ML
INJECTION, SOLUTION INTRAVENOUS AS NEEDED
Status: DISCONTINUED | OUTPATIENT
Start: 2019-08-07 | End: 2019-08-07 | Stop reason: HOSPADM

## 2019-08-07 RX ORDER — HYDROMORPHONE HYDROCHLORIDE 1 MG/ML
0.5 INJECTION, SOLUTION INTRAMUSCULAR; INTRAVENOUS; SUBCUTANEOUS
Status: DISCONTINUED | OUTPATIENT
Start: 2019-08-07 | End: 2019-08-07 | Stop reason: HOSPADM

## 2019-08-07 RX ORDER — OXYCODONE HYDROCHLORIDE AND ACETAMINOPHEN 5; 325 MG/1; MG/1
1 TABLET ORAL EVERY 6 HOURS PRN
Status: DISCONTINUED | OUTPATIENT
Start: 2019-08-07 | End: 2019-08-09 | Stop reason: HOSPADM

## 2019-08-07 RX ORDER — BUPIVACAINE HYDROCHLORIDE AND EPINEPHRINE 2.5; 5 MG/ML; UG/ML
INJECTION, SOLUTION EPIDURAL; INFILTRATION; INTRACAUDAL; PERINEURAL AS NEEDED
Status: DISCONTINUED | OUTPATIENT
Start: 2019-08-07 | End: 2019-08-07 | Stop reason: HOSPADM

## 2019-08-07 RX ORDER — PROMETHAZINE HYDROCHLORIDE 25 MG/1
25 TABLET ORAL ONCE AS NEEDED
Status: DISCONTINUED | OUTPATIENT
Start: 2019-08-07 | End: 2019-08-07 | Stop reason: HOSPADM

## 2019-08-07 RX ORDER — HYDROMORPHONE HYDROCHLORIDE 1 MG/ML
0.5 INJECTION, SOLUTION INTRAMUSCULAR; INTRAVENOUS; SUBCUTANEOUS
Status: DISCONTINUED | OUTPATIENT
Start: 2019-08-07 | End: 2019-08-09 | Stop reason: HOSPADM

## 2019-08-07 RX ORDER — ATRACURIUM BESYLATE 10 MG/ML
INJECTION, SOLUTION INTRAVENOUS AS NEEDED
Status: DISCONTINUED | OUTPATIENT
Start: 2019-08-07 | End: 2019-08-07 | Stop reason: SURG

## 2019-08-07 RX ADMIN — PROPOFOL 150 MG: 10 INJECTION, EMULSION INTRAVENOUS at 14:07

## 2019-08-07 RX ADMIN — SODIUM CHLORIDE, POTASSIUM CHLORIDE, SODIUM LACTATE AND CALCIUM CHLORIDE 9 ML/HR: 600; 310; 30; 20 INJECTION, SOLUTION INTRAVENOUS at 13:08

## 2019-08-07 RX ADMIN — ONDANSETRON 4 MG: 2 INJECTION INTRAMUSCULAR; INTRAVENOUS at 15:41

## 2019-08-07 RX ADMIN — PHENYLEPHRINE HYDROCHLORIDE 200 MCG: 10 INJECTION INTRAVENOUS at 14:18

## 2019-08-07 RX ADMIN — SODIUM CHLORIDE, PRESERVATIVE FREE 10 ML: 5 INJECTION INTRAVENOUS at 13:08

## 2019-08-07 RX ADMIN — FAMOTIDINE 20 MG: 20 TABLET ORAL at 13:27

## 2019-08-07 RX ADMIN — SODIUM CHLORIDE, PRESERVATIVE FREE 3 ML: 5 INJECTION INTRAVENOUS at 20:24

## 2019-08-07 RX ADMIN — LISINOPRIL 30 MG: 20 TABLET ORAL at 08:33

## 2019-08-07 RX ADMIN — LISINOPRIL 30 MG: 20 TABLET ORAL at 20:24

## 2019-08-07 RX ADMIN — ACETAMINOPHEN 650 MG: 325 TABLET, FILM COATED ORAL at 08:33

## 2019-08-07 RX ADMIN — SODIUM CHLORIDE 100 ML/HR: 9 INJECTION, SOLUTION INTRAVENOUS at 05:52

## 2019-08-07 RX ADMIN — TAZOBACTAM SODIUM AND PIPERACILLIN SODIUM 3.38 G: 375; 3 INJECTION, SOLUTION INTRAVENOUS at 17:38

## 2019-08-07 RX ADMIN — SODIUM CHLORIDE, POTASSIUM CHLORIDE, SODIUM LACTATE AND CALCIUM CHLORIDE: 600; 310; 30; 20 INJECTION, SOLUTION INTRAVENOUS at 15:15

## 2019-08-07 RX ADMIN — TAZOBACTAM SODIUM AND PIPERACILLIN SODIUM 3.38 G: 375; 3 INJECTION, SOLUTION INTRAVENOUS at 08:33

## 2019-08-07 RX ADMIN — FENTANYL CITRATE 50 MCG: 50 INJECTION, SOLUTION INTRAMUSCULAR; INTRAVENOUS at 15:31

## 2019-08-07 RX ADMIN — TAZOBACTAM SODIUM AND PIPERACILLIN SODIUM 3.38 G: 375; 3 INJECTION, SOLUTION INTRAVENOUS at 02:20

## 2019-08-07 RX ADMIN — FENTANYL CITRATE 100 MCG: 50 INJECTION, SOLUTION INTRAMUSCULAR; INTRAVENOUS at 14:07

## 2019-08-07 RX ADMIN — PHENYLEPHRINE HYDROCHLORIDE 200 MCG: 10 INJECTION INTRAVENOUS at 14:16

## 2019-08-07 RX ADMIN — DEXAMETHASONE SODIUM PHOSPHATE 4 MG: 4 INJECTION, SOLUTION INTRAMUSCULAR; INTRAVENOUS at 14:07

## 2019-08-07 RX ADMIN — PHENYLEPHRINE HYDROCHLORIDE 200 MCG: 10 INJECTION INTRAVENOUS at 14:25

## 2019-08-07 RX ADMIN — PHENYLEPHRINE HYDROCHLORIDE 200 MCG: 10 INJECTION INTRAVENOUS at 14:20

## 2019-08-07 RX ADMIN — PHENYLEPHRINE HYDROCHLORIDE 200 MCG: 10 INJECTION INTRAVENOUS at 14:22

## 2019-08-07 RX ADMIN — HYDROCODONE BITARTRATE AND ACETAMINOPHEN 1 TABLET: 5; 325 TABLET ORAL at 08:37

## 2019-08-07 RX ADMIN — PHENYLEPHRINE HYDROCHLORIDE 1 MCG/KG/MIN: 10 INJECTION INTRAVENOUS at 14:25

## 2019-08-07 RX ADMIN — LIDOCAINE HYDROCHLORIDE 60 MG: 10 INJECTION, SOLUTION EPIDURAL; INFILTRATION; INTRACAUDAL; PERINEURAL at 14:07

## 2019-08-07 RX ADMIN — ATRACURIUM BESYLATE 50 MG: 10 INJECTION, SOLUTION INTRAVENOUS at 14:07

## 2019-08-07 RX ADMIN — FENTANYL CITRATE 100 MCG: 50 INJECTION, SOLUTION INTRAMUSCULAR; INTRAVENOUS at 16:15

## 2019-08-07 NOTE — ANESTHESIA PREPROCEDURE EVALUATION
Anesthesia Evaluation     Patient summary reviewed and Nursing notes reviewed   no history of anesthetic complications:  NPO Solid Status: > 8 hours  NPO Liquid Status: > 2 hours           Airway   Mallampati: II  TM distance: >3 FB  Neck ROM: full  No difficulty expected  Dental      Pulmonary - negative pulmonary ROS    breath sounds clear to auscultation  Cardiovascular     ECG reviewed  Rhythm: regular  Rate: normal    (+) hypertension,   (-) murmur, friction rub, systolic click      Neuro/Psych  (+) CVA,       ROS Comment: Hemorrhagic stroke one year ago with right sided deficits. No residual deficits present per patient.   GI/Hepatic/Renal/Endo    (+) obesity,       Musculoskeletal (-) negative ROS    Abdominal    Substance History - negative use     OB/GYN          Other - negative ROS                     Anesthesia Plan    ASA 3     general     intravenous induction   Anesthetic plan, all risks, benefits, and alternatives have been provided, discussed and informed consent has been obtained with: patient.

## 2019-08-07 NOTE — PROGRESS NOTES
"General Surgery Daily Progress Note    Subjective:  No acute events.    Objective:  /89 (BP Location: Left arm, Patient Position: Lying)   Pulse 109   Temp 99.8 °F (37.7 °C) (Temporal)   Resp 18   Ht 167.6 cm (66\")   Wt 116 kg (255 lb)   SpO2 95%   BMI 41.16 kg/m²     Physical Exam:  General: NAD, AAO x 3   Abdomen:  Soft, tender to palpation in the right upper quadrant, ND.        Imaging Results (last 24 hours)     ** No results found for the last 24 hours. **          CBC:  Results from last 7 days   Lab Units 08/07/19  0714   WBC 10*3/mm3 22.72*   HEMOGLOBIN g/dL 14.5   HEMATOCRIT % 45.6   PLATELETS 10*3/mm3 257       CMP:  Results from last 7 days   Lab Units 08/07/19  0714   SODIUM mmol/L 139   POTASSIUM mmol/L 4.0   CHLORIDE mmol/L 100   CO2 mmol/L 26.0   BUN mg/dL 12   CREATININE mg/dL 1.03   CALCIUM mg/dL 9.1   BILIRUBIN mg/dL 0.9   ALK PHOS U/L 85   ALT (SGPT) U/L 107*   AST (SGOT) U/L 67*   GLUCOSE mg/dL 144*         Assessment  HD #1 for 49 year old male admitted with acute cholecystitis.  To OR today for laparoscopic cholecystectomy.    Plan:   - OR today for lap cholecystectomy    Tawanna Hemphill MD - 8/7/2019, 1:55 PM          "

## 2019-08-07 NOTE — PLAN OF CARE
Problem: Patient Care Overview  Goal: Plan of Care Review  Outcome: Ongoing (interventions implemented as appropriate)   08/07/19 9862   Coping/Psychosocial   Plan of Care Reviewed With patient   Plan of Care Review   Progress improving   OTHER   Outcome Summary Surgery complete, pt not co any pain at this time. Reviewed plan w pt and father.

## 2019-08-07 NOTE — PROGRESS NOTES
[facility] Medicine Services  PROGRESS NOTE    Patient Name: Valente Arndt Jr.  : 1970  MRN: [id number]    Date of Admission: 2019  Length of Stay: 1  Primary Care Physician: Raoul Anthony DO    Subjective   Subjective     CC:  Acute cholecystitis    HPI:  Febrile overnight.  Continues to have right upper quadrant pain.    Review of Systems  General: Fevers  CV: denies chest pain  Resp: denies shortness of breath  Abd: Upper quadrant pain      Otherwise ROS is negative except as mentioned in the HPI.    Objective   Objective     Vital Signs:   Temp:  [98.5 °F (36.9 °C)-101.2 °F (38.4 °C)] 101.2 °F (38.4 °C)  Heart Rate:  [] 108  Resp:  [16-18] 16  BP: (138-174)/() 138/86        Physical Exam:  Constitutional: No acute distress, awake, alert  Respiratory: Clear to auscultation bilaterally, respiratory effort normal   Cardiovascular: RRR, no murmurs, rubs, or gallops, palpable pedal pulses bilaterally  Gastrointestinal: Positive bowel sounds, soft, right upper quadrant tenderness, voluntary guarding  Musculoskeletal: No bilateral ankle edema  Psychiatric: Appropriate affect, cooperative  Neurologic: Oriented x 3, strength symmetric in all extremities, Cranial Nerves grossly intact to confrontation, speech clear  Skin: No rashes      Results Reviewed:  I have personally reviewed current lab, radiology, and data and agree.    Results from last 7 days   Lab Units 19  0714 19  0724   WBC 10*3/mm3 22.72* 14.47*   HEMOGLOBIN g/dL 14.5 14.4   HEMATOCRIT % 45.6 45.1   PLATELETS 10*3/mm3 257 278     Results from last 7 days   Lab Units 19  0714 19  0724   SODIUM mmol/L 139 141   POTASSIUM mmol/L 4.0 4.0   CHLORIDE mmol/L 100 102   CO2 mmol/L 26.0 28.0   BUN mg/dL 12 20   CREATININE mg/dL 1.03 1.00   GLUCOSE mg/dL 144* 141*   CALCIUM mg/dL 9.1 9.9   ALT (SGPT) U/L 107* 14   AST (SGOT) U/L 67* 18   TROPONIN T ng/mL  --  <0.010     Estimated  Creatinine Clearance: 103.9 mL/min (by C-G formula based on SCr of 1.03 mg/dL).    Microbiology Results Abnormal     None          Imaging Results (last 24 hours)     Procedure Component Value Units Date/Time    US Gallbladder [[id number]] Collected:  08/06/19 0840     Updated:  08/06/19 1011    Narrative:       EXAMINATION: US GALLBLADDER-08/06/2019:     INDICATION: RUQ TENDERNESS, N/V.     TECHNIQUE: Ultrasonographic images of the right upper quadrant were  obtained.     COMPARISON: NONE.     FINDINGS: The pancreas is poorly visualized secondary to bowel gas  artifact. Within the limitations, no obvious pancreatic abnormality  identified. The liver demonstrate a homogeneous echotexture without  evidence of hepatic lesion. No perihepatic fluid identified.     The gallbladder demonstrates echogenic foci with posterior acoustic  shadowing consistent with cholelithiasis. These stones are at the neck  of the gallbladder. Echogenic debris is also noted within the  gallbladder. The ultrasound technologist did note a positive Loo's  sign. Gallbladder wall thickening measuring up to 0.4 cm. No  pericholecystic fluid identified. The common bile duct measures 0.6 cm  which is slightly prominent for 49-year-old male.     The right kidney measures 10.2 x 4.6 x 5.8 cm. No renal mass or  hydronephrosis identified.       Impression:       1.  Distended gallbladder with echogenic gallstones noted within the  neck of the gallbladder and gallbladder wall thickening measuring up to  0.4 cm. These findings may relate to acute cholecystitis in the  appropriate clinical setting.     2.  The common bile duct measures up to 0.6 cm which is slightly  prominent given the patient's stated age.     D:  08/06/2019  E:  08/06/2019          XR Chest 1 View [[id number]] Collected:  08/06/19 0744     Updated:  08/06/19 0907    Narrative:       EXAMINATION: XR CHEST 1 VW- 08/06/2019      INDICATION: Upper Abdominal Pain Triage Protocol       COMPARISON: Chest radiograph 08/04/2018     FINDINGS: Single portable chest radiograph is submitted for review. The  heart is prominent in size, similar to prior exam. Lungs are clear. No  pleural effusion or pneumothorax. Visualized upper abdomen is clear.  Small hiatal hernia suggested. The osseous structures appear intact.       Impression:       1. Low lung volumes without evidence of pneumonia.     2. The heart is prominent in size, similar to prior exam.     3. Small hiatal hernia suggested.     D:  08/06/2019  E:  08/06/2019                  I have reviewed the medications:  Scheduled Meds:  lisinopril 30 mg Oral Q12H   piperacillin-tazobactam 3.375 g Intravenous Q8H   sodium chloride 3 mL Intravenous Q12H     Continuous Infusions:  sodium chloride 100 mL/hr Last Rate: 100 mL/hr (08/07/19 0552)     PRN Meds:.•  acetaminophen  •  HYDROcodone-acetaminophen  •  labetalol  •  Morphine **AND** naloxone  •  ondansetron  •  sodium chloride  •  sodium chloride      Assessment/Plan   Assessment / Plan     Active Hospital Problems    Diagnosis  POA   • **Acute calculous cholecystitis [K80.00]  Yes   • Morbid obesity (CMS/HCC) [E66.01]  Yes   • Essential hypertension [I10]  Yes      Resolved Hospital Problems   No resolved problems to display.        Brief Hospital Course to date:    Acute Calculous Cholecystitis   Prominent CBD without hyperbilirubinemia  - GB Ultrasound with typical findings. No clear evidence of choledocholithiasis and labs normal.  Febrile overnight with white blood cell count trending up.  Continues to have significant right upper quadrant pain  -Continue IV zosyn, IVF, IV morphine and lortab prn, IV antiemetics   -Cholecystectomy today per Dr. Hemphill.     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     CODE STATUS:   Code Status and Medical Interventions:   Ordered at: 08/06/19 1237     Code Status:    CPR     Medical Interventions (Level of Support Prior to Arrest):    Full         Electronically signed by Nevin Rojas MD, 08/07/19, 8:43 AM.

## 2019-08-07 NOTE — ANESTHESIA POSTPROCEDURE EVALUATION
Patient: Valente Arndt Jr.    Procedure Summary     Date:  08/07/19 Room / Location:   [facility]    Anesthesia Start:  1402 Anesthesia Stop:  1633    Procedure:  CHOLECYSTECTOMY LAPAROSCOPIC (N/A Abdomen) Diagnosis:       Acute cholecystitis due to biliary calculus      (Acute Cholecystitis)    Surgeon:  Tawanna Hemphill MD Provider:  Jose M Glover Jr., MD    Anesthesia Type:  general ASA Status:  3          Anesthesia Type: general  Last vitals  BP   145/87 (08/07/19 1633)   Temp   97.7 °F (36.5 °C) (08/07/19 1633)   Pulse   92 (08/07/19 1633)   Resp   16 (08/07/19 1633)     SpO2   98 % (08/07/19 1633)     Post Anesthesia Care and Evaluation    Patient location during evaluation: PACU  Patient participation: complete - patient participated  Level of consciousness: awake and alert  Pain management: adequate  Airway patency: patent  Anesthetic complications: No anesthetic complications  PONV Status: none  Cardiovascular status: acceptable  Respiratory status: acceptable  Hydration status: acceptable

## 2019-08-07 NOTE — ANESTHESIA PROCEDURE NOTES
Airway  Urgency: elective    Airway not difficult    General Information and Staff    Patient location during procedure: OR    Indications and Patient Condition  Indications for airway management: airway protection    Preoxygenated: yes  MILS not maintained throughout  Mask difficulty assessment: 1 - vent by mask    Final Airway Details  Final airway type: endotracheal airway      Successful airway: ETT  Cuffed: yes   Successful intubation technique: direct laryngoscopy  Endotracheal tube insertion site: oral  Blade: Robson  Blade size: 3  ETT size (mm): 7.5  Cormack-Lehane Classification: grade I - full view of glottis  Placement verified by: chest auscultation and capnometry   Number of attempts at approach: 1

## 2019-08-07 NOTE — OP NOTE
Operative Note    Date of Surgery:  8/7/2019    Pre-Operative Diagnosis:   Acute Cholecystitis    Post-Operative Diagnosis: Same    Procedure:  Laparoscopic Cholecystectomy    Anesthesia:  HASEEBA    Surgeon:  Tawanna Hemphill MD    Assistant:  None    Intravenous Fluids:  1800 mL    Estimated Blood Loss:  150 mL    Findings:   1.  Acute inflamed and necrotic gallbladder with stones    Complications: None    Indication for Procedure: Mr. Arndt is a 49-year-old morbidly obese gentleman with a history of hypertensive stroke who presented to the emergency department yesterday with complaints of right upper quadrant abdominal pain.  The patient's clinical picture was concerning for acute cholecystitis.  I recommended cholecystectomy for treatment.  I counseled the patient as to the benefits and risks of cholecystectomy, including but not limited to: bleeding, infection, injury to adjacent viscera (small bowel, large bowel, duodenum, common bile duct, hepatic artery, the liver), bile leak, need for additional procedures or surgeries, conversion to an open procedure, and medical issues from a cardiopulmonary and venous thrombosis standpoint.  The patient understood these risks and wished to proceed.     Procedure:   The patient was brought to the operating room after informed consent was obtained.He was placed in the supine position on the operating table. General anesthesia was administered, and the abdomen was prepped and draped in the standard sterile fashion.  A timeout was performed indicating the patient's name and intended procedure.                The operation began with a periumbilical Dara trocar cutdown.  The skin was anesthetized and incised, and the peritoneum was entered sharply under direct visualization.  A figure of eight fascial suture was placed, and the blunt-tipped Bradford trocar was inserted intra-abdominally.  Survey of the underlying viscera demonstrated no injury. The abdomen was insufflated and  the patient was placed in reverse Trendelenburg and rolled slightly onto the left hand side. An 11 mm subxiphoid port was placed followed by two 5 mm ports along the right subcostal margin.  The gallbladder was very distended, enlarged, and partially necrotic.  It was decompressed. The fundus of the gallbladder was grasped and retracted cephalad up over the liver edge.  The neck of the gallbladder was then retracted anteriorly and laterally. The peritoneal attachments to the cystic duct and cystic artery were stripped down and a meticulous dissection demonstrated the critical view.  The patient's tissues were very friable and bled easily.  Two clips were placed proximally on the cystic duct and one clip was placed distally followed by sharp transection. The cystic artery was controlled using two clips proximally and one distally followed by sharp transection.  A posterior branch of the cystic artery was also controlled in a similar fashion. The gallbladder was removed from the liver bed using electrocautery and placed in an Endo Catch bag. It was withdrawn from the abdomen from the subxiphoid port (which was enlarged to accommodate the gallbladder).. The clips were reinspected and were noted to be intact without bile leak or bleeding noted. The abdomen was briefly desufflated followed by re- insufflation after several minutes with reinspection of the operative bed. Again, all clips were noted to be intact. There was no bile leak or bleeding noted at the cystic duct, cystic artery, or gallbladder fossa.  A piece of Surgicel was placed in the gallbladder fossa.  All trochars were removed under direct visualization. The previously placed umbilical fascial stitch was tied in order to close the defect at this location. All skin incisions were closed with 4-0 Vicryl followed by skin glue. Of note, all lap, sponge, and needle counts are noted to be correct at the end of the case x 2. The patient was then transferred to the  recovery room in stable condition.         Tawanna Hemphill MD     Date: 8/7/2019  Time: 4:27 PM

## 2019-08-07 NOTE — PLAN OF CARE
Problem: Cholecystectomy (Adult)  Goal: Signs and Symptoms of Listed Potential Problems Will be Absent, Minimized or Managed (Cholecystectomy)  Outcome: Ongoing (interventions implemented as appropriate)   08/07/19 6772   Goal/Outcome Evaluation   Problems Assessed (Cholecystectomy) all   Problems Present (Cholecystectomy) decreased bowel motility;infection;pain

## 2019-08-08 LAB
ALBUMIN SERPL-MCNC: 3 G/DL (ref 3.5–5.2)
ALBUMIN/GLOB SERPL: 0.8 G/DL
ALP SERPL-CCNC: 92 U/L (ref 39–117)
ALT SERPL W P-5'-P-CCNC: 225 U/L (ref 1–41)
ANION GAP SERPL CALCULATED.3IONS-SCNC: 9 MMOL/L (ref 5–15)
AST SERPL-CCNC: 168 U/L (ref 1–40)
BILIRUB SERPL-MCNC: 0.9 MG/DL (ref 0.2–1.2)
BUN BLD-MCNC: 16 MG/DL (ref 6–20)
BUN/CREAT SERPL: 15.8 (ref 7–25)
CALCIUM SPEC-SCNC: 9.3 MG/DL (ref 8.6–10.5)
CHLORIDE SERPL-SCNC: 100 MMOL/L (ref 98–107)
CO2 SERPL-SCNC: 27 MMOL/L (ref 22–29)
CREAT BLD-MCNC: 1.01 MG/DL (ref 0.76–1.27)
DEPRECATED RDW RBC AUTO: 45.1 FL (ref 37–54)
ERYTHROCYTE [DISTWIDTH] IN BLOOD BY AUTOMATED COUNT: 13.4 % (ref 12.3–15.4)
GFR SERPL CREATININE-BSD FRML MDRD: 79 ML/MIN/1.73
GLOBULIN UR ELPH-MCNC: 3.7 GM/DL
GLUCOSE BLD-MCNC: 146 MG/DL (ref 65–99)
HCT VFR BLD AUTO: 40.8 % (ref 37.5–51)
HGB BLD-MCNC: 13.1 G/DL (ref 13–17.7)
MCH RBC QN AUTO: 29.1 PG (ref 26.6–33)
MCHC RBC AUTO-ENTMCNC: 32.1 G/DL (ref 31.5–35.7)
MCV RBC AUTO: 90.7 FL (ref 79–97)
PLATELET # BLD AUTO: 237 10*3/MM3 (ref 140–450)
PMV BLD AUTO: 11.7 FL (ref 6–12)
POTASSIUM BLD-SCNC: 4.2 MMOL/L (ref 3.5–5.2)
PROT SERPL-MCNC: 6.7 G/DL (ref 6–8.5)
RBC # BLD AUTO: 4.5 10*6/MM3 (ref 4.14–5.8)
SODIUM BLD-SCNC: 136 MMOL/L (ref 136–145)
WBC NRBC COR # BLD: 23.49 10*3/MM3 (ref 3.4–10.8)

## 2019-08-08 PROCEDURE — 25010000002 PIPERACILLIN SOD-TAZOBACTAM PER 1 G: Performed by: HOSPITALIST

## 2019-08-08 PROCEDURE — 94799 UNLISTED PULMONARY SVC/PX: CPT

## 2019-08-08 PROCEDURE — 85027 COMPLETE CBC AUTOMATED: CPT | Performed by: INTERNAL MEDICINE

## 2019-08-08 PROCEDURE — 80053 COMPREHEN METABOLIC PANEL: CPT | Performed by: SURGERY

## 2019-08-08 PROCEDURE — 99232 SBSQ HOSP IP/OBS MODERATE 35: CPT | Performed by: INTERNAL MEDICINE

## 2019-08-08 RX ADMIN — TAZOBACTAM SODIUM AND PIPERACILLIN SODIUM 3.38 G: 375; 3 INJECTION, SOLUTION INTRAVENOUS at 17:22

## 2019-08-08 RX ADMIN — SODIUM CHLORIDE 100 ML/HR: 9 INJECTION, SOLUTION INTRAVENOUS at 02:49

## 2019-08-08 RX ADMIN — SODIUM CHLORIDE, PRESERVATIVE FREE 3 ML: 5 INJECTION INTRAVENOUS at 20:11

## 2019-08-08 RX ADMIN — LISINOPRIL 30 MG: 20 TABLET ORAL at 20:11

## 2019-08-08 RX ADMIN — LISINOPRIL 30 MG: 20 TABLET ORAL at 08:25

## 2019-08-08 RX ADMIN — TAZOBACTAM SODIUM AND PIPERACILLIN SODIUM 3.38 G: 375; 3 INJECTION, SOLUTION INTRAVENOUS at 02:49

## 2019-08-08 RX ADMIN — TAZOBACTAM SODIUM AND PIPERACILLIN SODIUM 3.38 G: 375; 3 INJECTION, SOLUTION INTRAVENOUS at 09:04

## 2019-08-08 RX ADMIN — SODIUM CHLORIDE, PRESERVATIVE FREE 3 ML: 5 INJECTION INTRAVENOUS at 08:26

## 2019-08-08 NOTE — PROGRESS NOTES
[facility] Medicine Services  PROGRESS NOTE    Patient Name: Valente Arndt Jr.  : 1970  MRN: [id number]    Date of Admission: 2019  Length of Stay: 2  Primary Care Physician: Raoul Anthony DO    Subjective   Subjective     CC: Abdominal pain    HPI: No acute events overnight, patient had a good night, no more abdominal pain, tolerating clear liquid diets okay.      Review of Systems  Gen- No fevers, chills  CV- No chest pain, palpitations  Resp- No cough, dyspnea  GI- No N/V/D, abd pain    Otherwise ROS is negative except as mentioned in the HPI.    Objective   Objective     Vital Signs:   Temp:  [97 °F (36.1 °C)-101.2 °F (38.4 °C)] 98 °F (36.7 °C)  Heart Rate:  [] 84  Resp:  [16-18] 18  BP: (115-147)/(71-89) 120/77        Physical Exam:  Constitutional: No acute distress, awake, alert  HENT: NCAT, mucous membranes moist  Respiratory: Clear to auscultation bilaterally, respiratory effort normal   Cardiovascular: RRR, no murmurs, rubs, or gallops, palpable pedal pulses bilaterally  Gastrointestinal: Obese positive bowel sounds, soft, nontender, nondistended, incisions CDI  Musculoskeletal: No bilateral ankle edema  Psychiatric: Appropriate affect, cooperative  Neurologic: Oriented x 3, no focal deficits.    Skin: No rashes    Results Reviewed:  I have personally reviewed current lab, radiology, and data and agree.    Results from last 7 days   Lab Units 19  0714 19  0724   WBC 10*3/mm3 22.72* 14.47*   HEMOGLOBIN g/dL 14.5 14.4   HEMATOCRIT % 45.6 45.1   PLATELETS 10*3/mm3 257 278     Results from last 7 days   Lab Units 19  0714 19  0724   SODIUM mmol/L 139 141   POTASSIUM mmol/L 4.0 4.0   CHLORIDE mmol/L 100 102   CO2 mmol/L 26.0 28.0   BUN mg/dL 12 20   CREATININE mg/dL 1.03 1.00   GLUCOSE mg/dL 144* 141*   CALCIUM mg/dL 9.1 9.9   ALT (SGPT) U/L 107* 14   AST (SGOT) U/L 67* 18   TROPONIN T ng/mL  --  <0.010     Estimated Creatinine  Clearance: 103.9 mL/min (by C-G formula based on SCr of 1.03 mg/dL).    Microbiology Results Abnormal     Procedure Component Value - Date/Time    Blood Culture - Blood, Hand, Left [[id number]] Collected:  08/06/19 1120    Lab Status:  Preliminary result Specimen:  Blood from Hand, Left Updated:  08/07/19 1216     Blood Culture No growth at 24 hours    Blood Culture - Blood, Hand, Right [[id number]] Collected:  08/06/19 1135    Lab Status:  Preliminary result Specimen:  Blood from Hand, Right Updated:  08/07/19 1216     Blood Culture No growth at 24 hours          Imaging Results (last 24 hours)     Procedure Component Value Units Date/Time    US Gallbladder [[id number]] Collected:  08/06/19 0840     Updated:  08/07/19 1624    Narrative:       EXAMINATION: US GALLBLADDER-08/06/2019:     INDICATION: RUQ TENDERNESS, N/V.     TECHNIQUE: Ultrasonographic images of the right upper quadrant were  obtained.     COMPARISON: NONE.     FINDINGS: The pancreas is poorly visualized secondary to bowel gas  artifact. Within the limitations, no obvious pancreatic abnormality  identified. The liver demonstrate a homogeneous echotexture without  evidence of hepatic lesion. No perihepatic fluid identified.     The gallbladder demonstrates echogenic foci with posterior acoustic  shadowing consistent with cholelithiasis. These stones are at the neck  of the gallbladder. Echogenic debris is also noted within the  gallbladder. The ultrasound technologist did note a positive Loo's  sign. Gallbladder wall thickening measuring up to 0.4 cm. No  pericholecystic fluid identified. The common bile duct measures 0.6 cm  which is slightly prominent for 49-year-old male.     The right kidney measures 10.2 x 4.6 x 5.8 cm. No renal mass or  hydronephrosis identified.       Impression:       1.  Distended gallbladder with echogenic gallstones noted within the  neck of the gallbladder and gallbladder wall thickening measuring up to  0.4 cm. These  findings may relate to acute cholecystitis in the  appropriate clinical setting.     2.  The common bile duct measures up to 0.6 cm which is slightly  prominent given the patient's stated age.     D:  08/06/2019  E:  08/06/2019     This report was finalized on 8/7/2019 4:21 PM by Dr. Hao Ferro MD.       XR Chest 1 View [[id number]] Collected:  08/06/19 0744     Updated:  08/07/19 1622    Narrative:       EXAMINATION: XR CHEST 1 VW- 08/06/2019      INDICATION: Upper Abdominal Pain Triage Protocol      COMPARISON: Chest radiograph 08/04/2018     FINDINGS: Single portable chest radiograph is submitted for review. The  heart is prominent in size, similar to prior exam. Lungs are clear. No  pleural effusion or pneumothorax. Visualized upper abdomen is clear.  Small hiatal hernia suggested. The osseous structures appear intact.       Impression:       1. Low lung volumes without evidence of pneumonia.     2. The heart is prominent in size, similar to prior exam.     3. Small hiatal hernia suggested.     D:  08/06/2019  E:  08/06/2019     This report was finalized on 8/7/2019 4:19 PM by Dr. Hao Ferro MD.                  I have reviewed the medications:  Scheduled Meds:    lisinopril 30 mg Oral Q12H   piperacillin-tazobactam 3.375 g Intravenous Q8H   sodium chloride 3 mL Intravenous Q12H     Continuous Infusions:    lactated ringers 9 mL/hr Last Rate: 9 mL/hr (08/07/19 1308)   sodium chloride 100 mL/hr Last Rate: 100 mL/hr (08/08/19 0249)     PRN Meds:.•  acetaminophen  •  HYDROmorphone  •  labetalol  •  lactated ringers  •  Morphine **AND** naloxone  •  ondansetron  •  oxyCODONE-acetaminophen  •  oxyCODONE-acetaminophen  •  sodium chloride  •  sodium chloride      Assessment/Plan   Assessment / Plan     Active Hospital Problems    Diagnosis  POA   • Morbid obesity (CMS/[facility]) [E66.01]  Yes   • Essential hypertension [I10]  Yes      Resolved Hospital Problems    Diagnosis Date Resolved POA   • **Acute  calculous cholecystitis [K80.00] 08/07/2019 Yes        Brief Hospital Course to date:  Valente Arndt Jr. is a 49 y.o. male with history of hypertension, morbid obesity, left basal ganglial ICH (non-aneurysmal), presented to [facility] acute onset sharp RUQ abdominal pain admitted with acute cholecystitis, he is s/p CCY.    Plan  -He is status post CCY findings of acute inflamed and necrotic gallbladder with stones, postop care per Dr. Hemphill, continue Zosyn for now, f/u blood cultures, IV fluids  -Hypertension, BP currently well controlled, continue lisinopril.  -Morbid obesity, complicates all aspects of care.    DVT Prophylaxis: Mechanical    Disposition: TBD, anticipate to discharge home when okay by general surgery.    CODE STATUS:   Code Status and Medical Interventions:   Ordered at: 08/06/19 1237     Code Status:    CPR     Medical Interventions (Level of Support Prior to Arrest):    Full       Electronically signed by Maurilio Maldonado MD, 08/08/19, 8:06 AM.

## 2019-08-08 NOTE — PROGRESS NOTES
Continued Stay Note   [place]     Patient Name: Valente Arndt Jr.  MRN: [id number]  Today's Date: 8/8/2019    Admit Date: 8/6/2019    Discharge Plan     Row Name 08/08/19 1121       Plan    Plan  Case management will continue to follow Mr Arndt for discharge planning needs when he is medicall y stable.    Patient/Family in Agreement with Plan  yes    Final Discharge Disposition Code  01 - home or self-care        Discharge Codes    No documentation.             J CARLOS Crowley

## 2019-08-08 NOTE — PLAN OF CARE
Problem: Patient Care Overview  Goal: Plan of Care Review  Outcome: Ongoing (interventions implemented as appropriate)   08/08/19 1023   Coping/Psychosocial   Plan of Care Reviewed With patient   Plan of Care Review   Progress improving   OTHER   Outcome Summary pt has no c/o pain or nausea. tolerating clear liquid diet, diet to be advanced to regular later today. VSS. will continue to monitor pt.

## 2019-08-08 NOTE — DISCHARGE SUMMARY
[facility] Medicine Services  DISCHARGE SUMMARY    Patient Name: Valente Arndt Jr.  : 1970  MRN: [id number]    Date of Admission: 2019  Date of Discharge:  2019  Primary Care Physician: Raoul Anthony DO    Consults     Date and Time Order Name Status Description    2019 1520 Inpatient General Surgery Consult Completed           Hospital Course     Presenting Problem:   Acute calculous cholecystitis [K80.00]    Active Hospital Problems    Diagnosis  POA   • Morbid obesity (CMS/HCC) [E66.01]  Yes   • Essential hypertension [I10]  Yes      Resolved Hospital Problems    Diagnosis Date Resolved POA   • **Acute calculous cholecystitis [K80.00] 2019 Yes        Hospital Course:  Valente Arndt Jr. is a 49 y.o. male with history of hypertension, morbid obesity, left basal ganglial ICH (non-aneurysmal), [facility] to [facility] acute onset sharp RUQ abdominal pain admitted with acute cholecystitis, he is s/p CCY with  findings of acute inflamed and necrotic gallbladder with stones.  His postop course was uneventful, he did have some leukocytosis that was likely reactive.... pain is resolved, tolerating his diet well as such, she was deemed stable for discharge home.     Discharge Follow Up Recommendations for labs/diagnostics:  Follow-up with PCP in 1 week  Follow-up with Dr Hemphill in 2 weeks    Day of Discharge     HPI:No acute events overnight, patient states that he had a ggod night, tolerated his diet well.     Review of Systems  Gen- No fevers, chills  CV- No chest pain, palpitations  Resp- No cough, dyspnea  GI- No N/V/D, abd pain    Otherwise ROS is negative except as mentioned in the HPI.    Vital Signs:   Temp:  [97.8 °F (36.6 °C)-99.4 °F (37.4 °C)] 98 °F (36.7 °C)  Heart Rate:  [61-99] 73  Resp:  [16-18] 18  BP: (110-128)/(68-83) 128/78     Physical Exam:  Constitutional: No acute distress, awake, alert  HENT: NCAT, mucous membranes moist  Respiratory:  Clear to auscultation bilaterally, respiratory effort normal   Cardiovascular: RRR, no murmurs, rubs, or gallops, palpable pedal pulses bilaterally  Gastrointestinal: Positive bowel sounds, soft, nontender, nondistended, incisions healing well  Musculoskeletal: No bilateral ankle edema  Psychiatric: Appropriate affect, cooperative  Neurologic: Oriented x 3, deficits.    Skin: No rashes    Pertinent  and/or Most Recent Results     Results from last 7 days   Lab Units 08/09/19  0844 08/08/19  0807 08/07/19  0714 08/06/19  0724   WBC 10*3/mm3 11.73* 23.49* 22.72* 14.47*   HEMOGLOBIN g/dL 12.5* 13.1 14.5 14.4   HEMATOCRIT % 39.3 40.8 45.6 45.1   PLATELETS 10*3/mm3 224 237 257 278   SODIUM mmol/L 138 136 139 141   POTASSIUM mmol/L 3.8 4.2 4.0 4.0   CHLORIDE mmol/L 101 100 100 102   CO2 mmol/L 27.0 27.0 26.0 28.0   BUN mg/dL 14 16 12 20   CREATININE mg/dL 0.97 1.01 1.03 1.00   GLUCOSE mg/dL 180* 146* 144* 141*   CALCIUM mg/dL 9.2 9.3 9.1 9.9     Results from last 7 days   Lab Units 08/09/19  0844 08/08/19  0807 08/07/19  0714 08/06/19  0724   BILIRUBIN mg/dL 0.5 0.9 0.9 0.4   ALK PHOS U/L 79 92 85 80   ALT (SGPT) U/L 139* 225* 107* 14   AST (SGOT) U/L 65* 168* 67* 18           Invalid input(s): TG, LDLCALC, LDLREALC  Results from last 7 days   Lab Units 08/06/19  1138 08/06/19  0724   TROPONIN T ng/mL  --  <0.010   LACTATE mmol/L 1.6  --        Brief Urine Lab Results  (Last result in the past 365 days)      Color   Clarity   Blood   Leuk Est   Nitrite   Protein   CREAT   Urine HCG        08/06/19 0718 Yellow Clear Negative Negative Negative Negative               Microbiology Results Abnormal     Procedure Component Value - Date/Time    Blood Culture - Blood, Hand, Left [[id number]] Collected:  08/06/19 1120    Lab Status:  Preliminary result Specimen:  Blood from Hand, Left Updated:  08/08/19 1216     Blood Culture No growth at 2 days    Blood Culture - Blood, Hand, Right [[id number]] Collected:  08/06/19 1135    Lab  Status:  Preliminary result Specimen:  Blood from Hand, Right Updated:  08/08/19 1216     Blood Culture No growth at 2 days          Imaging Results (all)     Procedure Component Value Units Date/Time    US Gallbladder [[id number]] Collected:  08/06/19 0840     Updated:  08/07/19 1624    Narrative:       EXAMINATION: US GALLBLADDER-08/06/2019:     INDICATION: RUQ TENDERNESS, N/V.     TECHNIQUE: Ultrasonographic images of the right upper quadrant were  obtained.     COMPARISON: NONE.     FINDINGS: The pancreas is poorly visualized secondary to bowel gas  artifact. Within the limitations, no obvious pancreatic abnormality  identified. The liver demonstrate a homogeneous echotexture without  evidence of hepatic lesion. No perihepatic fluid identified.     The gallbladder demonstrates echogenic foci with posterior acoustic  shadowing consistent with cholelithiasis. These stones are at the neck  of the gallbladder. Echogenic debris is also noted within the  gallbladder. The ultrasound technologist did note a positive Loo's  sign. Gallbladder wall thickening measuring up to 0.4 cm. No  pericholecystic fluid identified. The common bile duct measures 0.6 cm  which is slightly prominent for 49-year-old male.     The right kidney measures 10.2 x 4.6 x 5.8 cm. No renal mass or  hydronephrosis identified.       Impression:       1.  Distended gallbladder with echogenic gallstones noted within the  neck of the gallbladder and gallbladder wall thickening measuring up to  0.4 cm. These findings may relate to acute cholecystitis in the  appropriate clinical setting.     2.  The common bile duct measures up to 0.6 cm which is slightly  prominent given the patient's stated age.     D:  08/06/2019  E:  08/06/2019     This report was finalized on 8/7/2019 4:21 PM by Dr. Hao Ferro MD.       XR Chest 1 View [[id number]] Collected:  08/06/19 0744     Updated:  08/07/19 1622    Narrative:       EXAMINATION: XR CHEST 1 VW-  08/06/2019      INDICATION: Upper Abdominal Pain Triage Protocol      COMPARISON: Chest radiograph 08/04/2018     FINDINGS: Single portable chest radiograph is submitted for review. The  heart is prominent in size, similar to prior exam. Lungs are clear. No  pleural effusion or pneumothorax. Visualized upper abdomen is clear.  Small hiatal hernia suggested. The osseous structures appear intact.       Impression:       1. Low lung volumes without evidence of pneumonia.     2. The heart is prominent in size, similar to prior exam.     3. Small hiatal hernia suggested.     D:  08/06/2019  E:  08/06/2019     This report was finalized on 8/7/2019 4:19 PM by Dr. Hao Ferro MD.                Order Current Status    Tissue Pathology Exam In process    Blood Culture - Blood, Hand, Left Preliminary result    Blood Culture - Blood, Hand, Right Preliminary result        Discharge Details        Discharge Medications      New Medications      Instructions Start Date   oxyCODONE-acetaminophen 5-325 MG per tablet  Commonly known as:  PERCOCET   1 tablet, Oral, Every 6 Hours PRN         Continue These Medications      Instructions Start Date   lisinopril 30 MG tablet  Commonly known as:  PRINIVIL,ZESTRIL   30 mg, Oral, Every 12 Hours Scheduled             No Known Allergies      Discharge Disposition: Home    Discharge Diet:  Diet Order   Procedures   • Diet Regular       Discharge Activity: As tolerated    CODE STATUS:    Code Status and Medical Interventions:   Ordered at: 08/06/19 1237     Code Status:    CPR     Medical Interventions (Level of Support Prior to Arrest):    Full       Future Appointments   Date Time Provider Department Center   8/16/2019  9:45 AM Raoul Anthony DO [facility] None   10/25/2019  9:30 AM Raoul Anthony DO [facility] None       Time Spent on Discharge:  35 minutes    Electronically signed by Maurilio Maldonado MD, 08/09/19, 8:07 AM.

## 2019-08-08 NOTE — PROGRESS NOTES
"General Surgery Daily Progress Note    Subjective:  No complaints.    Objective:  /83 (BP Location: Left arm, Patient Position: Lying)   Pulse 66   Temp 98.5 °F (36.9 °C) (Oral)   Resp 16   Ht 167.6 cm (66\")   Wt 116 kg (255 lb)   SpO2 91%   BMI 41.16 kg/m²     Physical Exam:  General: NAD, AAO x 3   Abdomen:  Soft, NT, ND.  Incisions: c/d/i without erythema or drainage.    Imaging Results (last 24 hours)     Procedure Component Value Units Date/Time    US Gallbladder [[id number]] Collected:  08/06/19 0840     Updated:  08/07/19 1624    Narrative:       EXAMINATION: US GALLBLADDER-08/06/2019:     INDICATION: RUQ TENDERNESS, N/V.     TECHNIQUE: Ultrasonographic images of the right upper quadrant were  obtained.     COMPARISON: NONE.     FINDINGS: The pancreas is poorly visualized secondary to bowel gas  artifact. Within the limitations, no obvious pancreatic abnormality  identified. The liver demonstrate a homogeneous echotexture without  evidence of hepatic lesion. No perihepatic fluid identified.     The gallbladder demonstrates echogenic foci with posterior acoustic  shadowing consistent with cholelithiasis. These stones are at the neck  of the gallbladder. Echogenic debris is also noted within the  gallbladder. The ultrasound technologist did note a positive Loo's  sign. Gallbladder wall thickening measuring up to 0.4 cm. No  pericholecystic fluid identified. The common bile duct measures 0.6 cm  which is slightly prominent for 49-year-old male.     The right kidney measures 10.2 x 4.6 x 5.8 cm. No renal mass or  hydronephrosis identified.       Impression:       1.  Distended gallbladder with echogenic gallstones noted within the  neck of the gallbladder and gallbladder wall thickening measuring up to  0.4 cm. These findings may relate to acute cholecystitis in the  appropriate clinical setting.     2.  The common bile duct measures up to 0.6 cm which is slightly  prominent given the patient's " stated age.     D:  08/06/2019  E:  08/06/2019     This report was finalized on 8/7/2019 4:21 PM by Dr. Hao Ferro MD.       XR Chest 1 View [[id number]] Collected:  08/06/19 0744     Updated:  08/07/19 1622    Narrative:       EXAMINATION: XR CHEST 1 VW- 08/06/2019      INDICATION: Upper Abdominal Pain Triage Protocol      COMPARISON: Chest radiograph 08/04/2018     FINDINGS: Single portable chest radiograph is submitted for review. The  heart is prominent in size, similar to prior exam. Lungs are clear. No  pleural effusion or pneumothorax. Visualized upper abdomen is clear.  Small hiatal hernia suggested. The osseous structures appear intact.       Impression:       1. Low lung volumes without evidence of pneumonia.     2. The heart is prominent in size, similar to prior exam.     3. Small hiatal hernia suggested.     D:  08/06/2019  E:  08/06/2019     This report was finalized on 8/7/2019 4:19 PM by Dr. Hao Ferro MD.             CBC:  Results from last 7 days   Lab Units 08/08/19  0807   WBC 10*3/mm3 23.49*   HEMOGLOBIN g/dL 13.1   HEMATOCRIT % 40.8   PLATELETS 10*3/mm3 237       CMP:  Results from last 7 days   Lab Units 08/08/19  0807   SODIUM mmol/L 136   POTASSIUM mmol/L 4.2   CHLORIDE mmol/L 100   CO2 mmol/L 27.0   BUN mg/dL 16   CREATININE mg/dL 1.01   CALCIUM mg/dL 9.3   BILIRUBIN mg/dL 0.9   ALK PHOS U/L 92   ALT (SGPT) U/L 225*   AST (SGOT) U/L 168*   GLUCOSE mg/dL 146*         Assessment  POD #1 for 49 year old male s/p laparoscopic cholecystectomy for acute cholecystitis.    Plan:   - Clears today --> Advance to solids at dinner if tolerates clears  - Anticipate discharge home tomorrow from surgical perspective    Tawanna Hemphill MD - 8/8/2019, 11:29 AM

## 2019-08-08 NOTE — PLAN OF CARE
Problem: Patient Care Overview  Goal: Plan of Care Review  Outcome: Ongoing (interventions implemented as appropriate)   08/08/19 0259   Coping/Psychosocial   Plan of Care Reviewed With patient   Plan of Care Review   Progress improving   OTHER   Outcome Summary VSS on room air; no complaints of nausea or pain; pt resting comfortably in bed throughout night; no complaints at this time       Problem: Cholecystectomy (Adult)  Goal: Signs and Symptoms of Listed Potential Problems Will be Absent, Minimized or Managed (Cholecystectomy)  Outcome: Ongoing (interventions implemented as appropriate)   08/08/19 0259   Goal/Outcome Evaluation   Problems Assessed (Cholecystectomy) all   Problems Present (Cholecystectomy) decreased bowel motility;infection     Goal: Anesthesia/Sedation Recovery  Outcome: Outcome(s) achieved Date Met: 08/08/19 08/08/19 0259   Goal/Outcome Evaluation   Anesthesia/Sedation Recovery recovered to baseline

## 2019-08-09 VITALS
HEART RATE: 73 BPM | BODY MASS INDEX: 40.98 KG/M2 | HEIGHT: 66 IN | DIASTOLIC BLOOD PRESSURE: 78 MMHG | RESPIRATION RATE: 18 BRPM | SYSTOLIC BLOOD PRESSURE: 128 MMHG | WEIGHT: 255 LBS | OXYGEN SATURATION: 95 % | TEMPERATURE: 98 F

## 2019-08-09 LAB
ALBUMIN SERPL-MCNC: 2.9 G/DL (ref 3.5–5.2)
ALBUMIN/GLOB SERPL: 0.8 G/DL
ALP SERPL-CCNC: 79 U/L (ref 39–117)
ALT SERPL W P-5'-P-CCNC: 139 U/L (ref 1–41)
ANION GAP SERPL CALCULATED.3IONS-SCNC: 10 MMOL/L (ref 5–15)
AST SERPL-CCNC: 65 U/L (ref 1–40)
BILIRUB SERPL-MCNC: 0.5 MG/DL (ref 0.2–1.2)
BUN BLD-MCNC: 14 MG/DL (ref 6–20)
BUN/CREAT SERPL: 14.4 (ref 7–25)
CALCIUM SPEC-SCNC: 9.2 MG/DL (ref 8.6–10.5)
CHLORIDE SERPL-SCNC: 101 MMOL/L (ref 98–107)
CO2 SERPL-SCNC: 27 MMOL/L (ref 22–29)
CREAT BLD-MCNC: 0.97 MG/DL (ref 0.76–1.27)
DEPRECATED RDW RBC AUTO: 45.8 FL (ref 37–54)
ERYTHROCYTE [DISTWIDTH] IN BLOOD BY AUTOMATED COUNT: 13.5 % (ref 12.3–15.4)
GFR SERPL CREATININE-BSD FRML MDRD: 82 ML/MIN/1.73
GLOBULIN UR ELPH-MCNC: 3.7 GM/DL
GLUCOSE BLD-MCNC: 180 MG/DL (ref 65–99)
HCT VFR BLD AUTO: 39.3 % (ref 37.5–51)
HGB BLD-MCNC: 12.5 G/DL (ref 13–17.7)
MCH RBC QN AUTO: 29.1 PG (ref 26.6–33)
MCHC RBC AUTO-ENTMCNC: 31.8 G/DL (ref 31.5–35.7)
MCV RBC AUTO: 91.4 FL (ref 79–97)
PLATELET # BLD AUTO: 224 10*3/MM3 (ref 140–450)
PMV BLD AUTO: 11.5 FL (ref 6–12)
POTASSIUM BLD-SCNC: 3.8 MMOL/L (ref 3.5–5.2)
PROT SERPL-MCNC: 6.6 G/DL (ref 6–8.5)
RBC # BLD AUTO: 4.3 10*6/MM3 (ref 4.14–5.8)
SODIUM BLD-SCNC: 138 MMOL/L (ref 136–145)
WBC NRBC COR # BLD: 11.73 10*3/MM3 (ref 3.4–10.8)

## 2019-08-09 PROCEDURE — 25010000002 PIPERACILLIN SOD-TAZOBACTAM PER 1 G: Performed by: HOSPITALIST

## 2019-08-09 PROCEDURE — 85027 COMPLETE CBC AUTOMATED: CPT | Performed by: SURGERY

## 2019-08-09 PROCEDURE — 99239 HOSP IP/OBS DSCHRG MGMT >30: CPT | Performed by: INTERNAL MEDICINE

## 2019-08-09 PROCEDURE — 80053 COMPREHEN METABOLIC PANEL: CPT | Performed by: SURGERY

## 2019-08-09 RX ORDER — OXYCODONE HYDROCHLORIDE AND ACETAMINOPHEN 5; 325 MG/1; MG/1
1 TABLET ORAL EVERY 6 HOURS PRN
Qty: 12 TABLET | Refills: 0 | Status: SHIPPED | OUTPATIENT
Start: 2019-08-09 | End: 2019-08-12

## 2019-08-09 RX ADMIN — TAZOBACTAM SODIUM AND PIPERACILLIN SODIUM 3.38 G: 375; 3 INJECTION, SOLUTION INTRAVENOUS at 01:19

## 2019-08-09 RX ADMIN — SODIUM CHLORIDE, PRESERVATIVE FREE 3 ML: 5 INJECTION INTRAVENOUS at 09:19

## 2019-08-09 RX ADMIN — LISINOPRIL 30 MG: 20 TABLET ORAL at 09:18

## 2019-08-09 RX ADMIN — TAZOBACTAM SODIUM AND PIPERACILLIN SODIUM 3.38 G: 375; 3 INJECTION, SOLUTION INTRAVENOUS at 09:18

## 2019-08-09 NOTE — PLAN OF CARE
Problem: Patient Care Overview  Goal: Plan of Care Review  Outcome: Ongoing (interventions implemented as appropriate)   08/09/19 0321   Coping/Psychosocial   Plan of Care Reviewed With patient   Plan of Care Review   Progress improving   OTHER   Outcome Summary VSS on room air; NSR; no complaints at this time; pt resting well, call light within reach       Problem: Cholecystectomy (Adult)  Goal: Signs and Symptoms of Listed Potential Problems Will be Absent, Minimized or Managed (Cholecystectomy)  Outcome: Ongoing (interventions implemented as appropriate)   08/09/19 0321   Goal/Outcome Evaluation   Problems Assessed (Cholecystectomy) all   Problems Present (Cholecystectomy) infection

## 2019-08-09 NOTE — PLAN OF CARE
Problem: Patient Care Overview  Goal: Plan of Care Review  Outcome: Outcome(s) achieved Date Met: 08/09/19 08/09/19 1050   Coping/Psychosocial   Plan of Care Reviewed With patient   Plan of Care Review   Progress improving   OTHER   Outcome Summary VSS, room air, no complaints of pain. Passing gas and tolerating solid food well. Discharge home today.

## 2019-08-09 NOTE — DISCHARGE INSTRUCTIONS
1.  You may shower in 48 hours.  Gently rinse your incisions with soapy water.  Do not immerse your incisions in water (i.e...tub baths, hot tubs, etc...) until cleared by a physician.  The skin glue will eventually peel off.  2.  You may supplement your narcotic pain medications with Ibuprofen.  Take a stool softener when taking narcotic pain medications.  3.  Do not drive while on narcotic pain medications.  4.  No heavy lifting greater than 10 pounds for 4 weeks after your surgery.  No strenuous activity (i.e... pushing, pulling, etc...) for 4 weeks after your surgery.  5.  Call the office for any fevers greater than 101.5, nausea, vomiting, pain not relieved by pain medications, redness/swelling/drainage from your incisions, inability to pass flatus or bowel movements, or any other concerning signs or symptoms.  6.  Follow up with Dr. Hemphill at [facility] in 3 weeks (ph: [phone]).

## 2019-08-09 NOTE — PROGRESS NOTES
"General Surgery Daily Progress Note    Subjective:  No complaints.    Objective:  /78 (BP Location: Left arm, Patient Position: Lying)   Pulse 73   Temp 98 °F (36.7 °C) (Oral)   Resp 18   Ht 167.6 cm (66\")   Wt 116 kg (255 lb)   SpO2 95%   BMI 41.16 kg/m²     Physical Exam:  General: NAD, AAO x 3   Abdomen:  Soft, NT, ND.  Incisions: c/d/i without erythema or drainage.    Imaging Results (last 24 hours)     ** No results found for the last 24 hours. **          CBC:  Results from last 7 days   Lab Units 08/09/19  0844   WBC 10*3/mm3 11.73*   HEMOGLOBIN g/dL 12.5*   HEMATOCRIT % 39.3   PLATELETS 10*3/mm3 224       CMP:  Results from last 7 days   Lab Units 08/09/19  0844   SODIUM mmol/L 138   POTASSIUM mmol/L 3.8   CHLORIDE mmol/L 101   CO2 mmol/L 27.0   BUN mg/dL 14   CREATININE mg/dL 0.97   CALCIUM mg/dL 9.2   BILIRUBIN mg/dL 0.5   ALK PHOS U/L 79   ALT (SGPT) U/L 139*   AST (SGOT) U/L 65*   GLUCOSE mg/dL 180*         Assessment  POD #2 for 49 year old male s/p laparoscopic cholecystectomy for acute cholecystitis.  Doing well.  Anticipate discharge home today.      Plan:   - Anticipate discharge home today  - Follow-up with Dr. Hemphill in 3 weeks    Tawanna Hemphill MD - 8/9/2019, 10:35 AM          "

## 2019-08-10 ENCOUNTER — READMISSION MANAGEMENT (OUTPATIENT)
Dept: CALL CENTER | Facility: HOSPITAL | Age: 49
End: 2019-08-10

## 2019-08-10 LAB
CYTO UR: NORMAL
LAB AP CASE REPORT: NORMAL
LAB AP CLINICAL INFORMATION: NORMAL
PATH REPORT.FINAL DX SPEC: NORMAL
PATH REPORT.GROSS SPEC: NORMAL

## 2019-08-11 ENCOUNTER — READMISSION MANAGEMENT (OUTPATIENT)
Dept: CALL CENTER | Facility: HOSPITAL | Age: 49
End: 2019-08-11

## 2019-08-11 LAB
BACTERIA SPEC AEROBE CULT: NORMAL
BACTERIA SPEC AEROBE CULT: NORMAL

## 2019-08-11 NOTE — OUTREACH NOTE
Prep Survey      Responses   Facility patient discharged from?  [facility]   Is patient eligible?  Yes   Discharge diagnosis  Acute cholecysitis, sp lap charito,  HTN   Does the patient have one of the following disease processes/diagnoses(primary or secondary)?  General Surgery   Does the patient have Home health ordered?  No   Is there a DME ordered?  No   Prep survey completed?  Yes          Meredith Blackburn RN

## 2019-08-11 NOTE — OUTREACH NOTE
General Surgery Week 1 Survey      Responses   Facility patient discharged from?  [place]   Does the patient have one of the following disease processes/diagnoses(primary or secondary)?  General Surgery   Is there a successful TCM telephone encounter documented?  No   Week 1 attempt successful?  No   Unsuccessful attempts  Attempt 1          Maria Eugenia Oconnell RN

## 2019-08-12 ENCOUNTER — TRANSITIONAL CARE MANAGEMENT TELEPHONE ENCOUNTER (OUTPATIENT)
Dept: FAMILY MEDICINE CLINIC | Facility: CLINIC | Age: 49
End: 2019-08-12

## 2019-08-12 ENCOUNTER — READMISSION MANAGEMENT (OUTPATIENT)
Dept: CALL CENTER | Facility: HOSPITAL | Age: 49
End: 2019-08-12

## 2019-08-12 NOTE — OUTREACH NOTE
Pt is doing well since discharge. No pain, nausea or vomiting. Confirmed [facility] appt with Dr Anthony for 08/16/19 @ 945am. Pt states he did receive written rx at d/c for Oxycodone for pain but has not felt the need to fill this.

## 2019-08-12 NOTE — OUTREACH NOTE
General Surgery Week 1 Survey      Responses   Facility patient discharged from?  [place]   Does the patient have one of the following disease processes/diagnoses(primary or secondary)?  General Surgery   Is there a successful TCM telephone encounter documented?  Yes          Samara Gamboa RN

## 2019-08-13 NOTE — PROGRESS NOTES
Subjective   Valente Arndt Jr. is a 49 y.o. male.     Chief Complaint   Patient presents with   • Follow-up       Cerebrovascular Accident   Pertinent negatives include no chest pain or coughing.        Patient presents today for follow-up on hypertension and obesity.  He was hospitalized from 8/4/18-8/9/18 at [facility] following a cerebrovascular accident/intracranial hemorrhage.  Since August of last year, he has been able to lose over 70 pounds from his peak weight of 320 pounds..  His blood pressure has had increasingly improved control as he has increased exercise and lost weight, he is still taking lisinopril 30mg BID and hydralazine 50mg TID. He is attempting to bring his LDL below 100 with diet and exercise, most recently was at 165. He has been able to continually bring his weight down through walking and calorie restriction, although his weight loss has stagnated some over the summer. He feels much improved.    The following portions of the patient's history were reviewed and updated as appropriate: allergies, current medications, past family history, past medical history, past social history, past surgical history and problem list.    Active Ambulatory Problems     Diagnosis Date Noted   • ICH (intracerebral hemorrhage) (CMS/HCC) 08/04/2018   • Essential hypertension 08/05/2018   • Class 3 severe obesity due to excess calories with serious comorbidity and body mass index (BMI) of 45.0 to 49.9 in adult (CMS/HCC) 08/05/2018   • Pure hypercholesterolemia 10/08/2018   • Morbid obesity (CMS/HCC) 08/06/2019     Resolved Ambulatory Problems     Diagnosis Date Noted   • H/O noncompliance with medical treatment, presenting hazards to health 08/05/2018   • Other constipation 08/08/2018   • Acute calculous cholecystitis 08/06/2019     Past Medical History:   Diagnosis Date   • Hypertension    • Left Intracranial hemorrhage (CMS/HCC)    • Stroke (CMS/[facility])      Past Surgical History:   Procedure  "Laterality Date   • APPENDECTOMY      Open   • CHOLECYSTECTOMY N/A 8/7/2019    Procedure: CHOLECYSTECTOMY LAPAROSCOPIC;  Surgeon: Tawanna Hemphill MD;  Location: [facility];  Service: General   • FOOT SURGERY     • WISDOM TOOTH EXTRACTION       Family History   Problem Relation Age of Onset   • Obesity Mother    • Hypertension Father    • Kidney disease Father    • Heart murmur Father    • No Known Problems Daughter    • No Known Problems Daughter      Social History     Socioeconomic History   • Marital status: Single     Spouse name: Not on file   • Number of children: Not on file   • Years of education: Not on file   • Highest education level: Not on file   Tobacco Use   • Smoking status: Never Smoker   • Smokeless tobacco: Never Used   Substance and Sexual Activity   • Alcohol use: Yes     Comment: rare occasion   • Drug use: No   • Sexual activity: No     Partners: Female   Social History Narrative    Lives with daughter and mother. Works at [employer].          Review of Systems   Constitutional:        Continued purposeful weight loss   HENT: Negative.    Respiratory: Negative for cough, shortness of breath and wheezing.    Cardiovascular: Negative for chest pain and palpitations.       Objective   Blood pressure 132/80, pulse 63, height 165.1 cm (65\"), weight 117 kg (258 lb), SpO2 97 %.  Nursing note reviewed  Physical Exam  Const: NAD, A&Ox4, Pleasant, Cooperative- severe obesity   Eyes: EOMI, no conjunctivitis  ENT: No nasal discharge present, neck supple; mucous membranes moist, dentition adequate  Cardiac: Regular rate and rhythm, no peripheral edema or cyanosis  Resp: Respiratory rate within normal limits, no increased work of breathing, no audible wheezing or retractions noted  GI: No distention or ascites  MSK: Motor and sensation grossly intact in bilateral upper extremities.  Neurologic, CN II-XII grossly intact  Psych: Appropriate mood and behavior.  Skin: Pink, warm, dry  Procedures  Assessment/Plan "   Valente was seen today for follow-up.    Diagnoses and all orders for this visit:    Essential hypertension  -     Comprehensive Metabolic Panel; Future  -     Comprehensive Metabolic Panel    Pure hypercholesterolemia  -     Comprehensive Metabolic Panel; Future  -     Lipid Panel; Future  -     Comprehensive Metabolic Panel  -     Lipid Panel    Class 3 severe obesity due to excess calories with serious comorbidity and body mass index (BMI) of 40.0 to 44.9 in adult (CMS/[facility])  -     Comprehensive Metabolic Panel; Future  -     Comprehensive Metabolic Panel      #1 history of CVA  Thought to be secondary to uncontrolled hypertension.  He is improved and should have no significant lasting deficits.  · He should continue medications for hypertension  · His already significant weight loss should continue to be beneficial.     #2 hypertension  · His already significant weight loss should continue to be beneficial  · He may come down on his medications with ongoing weight loss  · We discussed coming off of his hydralazine due to some hypotension, I think this is reasonable as long as he keeps an eye on his blood pressure    #3 obesity  He is doing very well.  He has been able to lose almost 70 pounds in the last 5-1/2 months.  He does not count his calories daily, but has cut out soda, second portions, and empty calories.  He should continue walking 2 miles per day as he has been doing.  · We discussed the fact that he will require continuously lower calorie intake level versus weight declines.  Winter will also continue to be a difficult time for outdoor walking, I recommended again that he join a low-cost gym for at least elliptical and treadmill training    #4 hyperlipidemia  Lab Results   Component Value Date    CHOL 156 07/25/2019    TRIG 48 07/25/2019    HDL 40 07/25/2019     (H) 07/25/2019   Given his history he should be on a statin regardless, however he would like to try to get his LDL below 100 through  diet and exercise.  His benefit from a statin is significant.    Patient Instructions   1.  Continue medications and supplements - as listed.    2.  Continue well-balanced diet - low in calories and low in added sugar.    3.  Maintain a routine exercise program - every week.    4.  A [employer] message will be sent with your test results.      Ambulatory progress note signed and attested to by Raoul Anthony D.O.

## 2019-08-16 ENCOUNTER — LAB (OUTPATIENT)
Dept: LAB | Facility: HOSPITAL | Age: 49
End: 2019-08-16

## 2019-08-16 ENCOUNTER — OFFICE VISIT (OUTPATIENT)
Dept: FAMILY MEDICINE CLINIC | Facility: CLINIC | Age: 49
End: 2019-08-16

## 2019-08-16 VITALS
SYSTOLIC BLOOD PRESSURE: 124 MMHG | WEIGHT: 250.6 LBS | BODY MASS INDEX: 40.27 KG/M2 | HEART RATE: 60 BPM | DIASTOLIC BLOOD PRESSURE: 80 MMHG | HEIGHT: 66 IN | OXYGEN SATURATION: 99 %

## 2019-08-16 DIAGNOSIS — K81.9 CHOLECYSTITIS: ICD-10-CM

## 2019-08-16 DIAGNOSIS — K81.9 CHOLECYSTITIS: Primary | ICD-10-CM

## 2019-08-16 LAB
BASOPHILS # BLD AUTO: 0.07 10*3/MM3 (ref 0–0.2)
BASOPHILS NFR BLD AUTO: 0.8 % (ref 0–1.5)
DEPRECATED RDW RBC AUTO: 46 FL (ref 37–54)
EOSINOPHIL # BLD AUTO: 0.13 10*3/MM3 (ref 0–0.4)
EOSINOPHIL NFR BLD AUTO: 1.5 % (ref 0.3–6.2)
ERYTHROCYTE [DISTWIDTH] IN BLOOD BY AUTOMATED COUNT: 13.3 % (ref 12.3–15.4)
HCT VFR BLD AUTO: 42.9 % (ref 37.5–51)
HGB BLD-MCNC: 13.3 G/DL (ref 13–17.7)
IMM GRANULOCYTES # BLD AUTO: 0.02 10*3/MM3 (ref 0–0.05)
IMM GRANULOCYTES NFR BLD AUTO: 0.2 % (ref 0–0.5)
LYMPHOCYTES # BLD AUTO: 1.66 10*3/MM3 (ref 0.7–3.1)
LYMPHOCYTES NFR BLD AUTO: 18.9 % (ref 19.6–45.3)
MCH RBC QN AUTO: 28.9 PG (ref 26.6–33)
MCHC RBC AUTO-ENTMCNC: 31 G/DL (ref 31.5–35.7)
MCV RBC AUTO: 93.1 FL (ref 79–97)
MONOCYTES # BLD AUTO: 0.65 10*3/MM3 (ref 0.1–0.9)
MONOCYTES NFR BLD AUTO: 7.4 % (ref 5–12)
NEUTROPHILS # BLD AUTO: 6.23 10*3/MM3 (ref 1.7–7)
NEUTROPHILS NFR BLD AUTO: 71.2 % (ref 42.7–76)
NRBC BLD AUTO-RTO: 0.1 /100 WBC (ref 0–0.2)
PLATELET # BLD AUTO: 454 10*3/MM3 (ref 140–450)
PMV BLD AUTO: 11.2 FL (ref 6–12)
RBC # BLD AUTO: 4.61 10*6/MM3 (ref 4.14–5.8)
WBC NRBC COR # BLD: 8.76 10*3/MM3 (ref 3.4–10.8)

## 2019-08-16 PROCEDURE — 85025 COMPLETE CBC W/AUTO DIFF WBC: CPT

## 2019-08-16 PROCEDURE — 99213 OFFICE O/P EST LOW 20 MIN: CPT | Performed by: FAMILY MEDICINE

## 2019-08-16 NOTE — PROGRESS NOTES
Transitional Care Follow Up Visit  Subjective     Valente Arndt Jr. is a 49 y.o. male who presents for a transitional care management visit.    Within 48 business hours after discharge our office contacted him via telephone to coordinate his care and needs.      I reviewed and discussed the details of that call along with the discharge summary, hospital problems, inpatient lab results, inpatient diagnostic studies, and consultation reports with Valente.    Patient Care Team:  Raoul Anthony DO as PCP - General (Family Medicine)     Current outpatient and discharge medications have been reconciled for the patient.  Reviewed by: Raoul Anthony DO      Date of TCM Phone Call 8/12/2019   [facility]   Date of Admission 8/6/2019   Date of Discharge 8/9/2019   Discharge Disposition Home or Self Care     Risk for Readmission (LACE) Score: 7 (8/9/2019  6:01 AM)      History of Present Illness   Course During Hospital Stay:  Valente Arndt Jr. is a 49 y.o. male with history of hypertension, morbid obesity, left basal ganglial ICH (non-aneurysmal), presented to [facility] acute onset sharp RUQ abdominal pain admitted with acute cholecystitis, he is s/p CCY with  findings of acute inflamed and necrotic gallbladder with stones.  His postop course was uneventful, he did have some leukocytosis that was likely reactive.... pain is resolved, tolerating his diet well as such, she was deemed stable for discharge home.    Since discharge he has been doing well. Back to regular diet. No abd pain. Has not needed pain meds. BP has returned to normal. No fever or chilles.     The following portions of the patient's history were reviewed and updated as appropriate: allergies, current medications, past family history, past medical history, past social history, past surgical history and problem list.    Review of Systems   Constitutional: Negative.    Gastrointestinal: Negative.         S/p CCY       Objective   Blood  "pressure 124/80, pulse 60, height 167.6 cm (65.98\"), weight 114 kg (250 lb 9.6 oz), SpO2 99 %.  Nursing note reviewed  Physical Exam  Const: NAD, A&Ox4, Pleasant, Cooperative  Eyes: EOMI, no conjunctivitis  ENT: No nasal discharge present, neck supple  Cardiac: Regular rate and rhythm, no cyanosis  Resp: Respiratory rate within normal limits, no increased work of breathing, no audible wheezing or retractions noted  GI: No distention or ascites. Incisions clean, dry. No TTP or guarding.  Assessment/Plan     Valente was seen today for follow-up.    Diagnoses and all orders for this visit:    Cholecystitis  -     CBC & Differential; Future      Recovering well post discharge. We will check a CBC to ensure it has returned to baseline    #2 HTN  Good control with lisinopril    #3 obesity  Doing well with weight loss plan    There are no Patient Instructions on file for this visit.             "

## 2019-08-17 ENCOUNTER — READMISSION MANAGEMENT (OUTPATIENT)
Dept: CALL CENTER | Facility: HOSPITAL | Age: 49
End: 2019-08-17

## 2019-08-17 NOTE — OUTREACH NOTE
General Surgery Week 2 Survey      Responses   Facility patient discharged from?  [place]   Does the patient have one of the following disease processes/diagnoses(primary or secondary)?  General Surgery   Week 2 attempt successful?  Yes   Call start time  [phone]   Call end time  [phone]   Discharge diagnosis  Acute cholecysitis, sp lap charito,  HTN   Meds reviewed with patient/caregiver?  Yes   Is the patient taking all medications as directed (includes completed medication regime)?  Yes   Has the patient kept scheduled appointments due by today?  Yes   What is the patient's perception of their health status since discharge?  Improving   Is the patient /caregiver able to teach back basic post-op care?  Continue use of incentive spirometry at least 1 week post discharge, Practice 'cough and deep breath', Drive as instructed by MD in discharge instructions, Take showers only when approved by MD-sponge bathe until then, No tub bath, swimming, or hot tub until instructed by MD, Keep incision areas clean,dry and protected, Do not remove steri-strips, Lifting as instructed by MD in discharge instructions   Is the patient/caregiver able to teach back signs and symptoms of incisional infection?  Increased redness, swelling or pain at the incisonal site, Increased drainage or bleeding, Incisional warmth, Pus or odor from incision, Fever   Is the patient/caregiver able to teach back steps to recovery at home?  Set small, achievable goals for return to baseline health, Rest and rebuild strength, gradually increase activity, Practice good oral hygiene, Eat a well-balance diet, Make a list of questions for surgeon's appointment   Is the patient/caregiver able to teach back the hierarchy of who to call/visit for symptoms/problems? PCP, Specialist, Home health nurse, Urgent Care, ED, 911  Yes   Week 2 call completed?  Yes          Iva David RN

## 2019-08-24 ENCOUNTER — READMISSION MANAGEMENT (OUTPATIENT)
Dept: CALL CENTER | Facility: HOSPITAL | Age: 49
End: 2019-08-24

## 2019-08-24 NOTE — OUTREACH NOTE
General Surgery Week 3 Survey      Responses   Facility patient discharged from?  [place]   Does the patient have one of the following disease processes/diagnoses(primary or secondary)?  General Surgery   Week 3 attempt successful?  No   Unsuccessful attempts  Attempt 1          Bea Magaña RN

## 2019-08-25 ENCOUNTER — READMISSION MANAGEMENT (OUTPATIENT)
Dept: CALL CENTER | Facility: HOSPITAL | Age: 49
End: 2019-08-25

## 2019-08-25 NOTE — OUTREACH NOTE
General Surgery Week 3 Survey      Responses   Facility patient discharged from?  [place]   Does the patient have one of the following disease processes/diagnoses(primary or secondary)?  General Surgery   Week 3 attempt successful?  No   Unsuccessful attempts  Attempt 2          Nneka Pino RN

## 2019-09-07 DIAGNOSIS — E66.01 MORBID OBESITY (HCC): ICD-10-CM

## 2019-09-07 DIAGNOSIS — I61.0 NONTRAUMATIC SUBCORTICAL HEMORRHAGE OF LEFT CEREBRAL HEMISPHERE (HCC): ICD-10-CM

## 2019-09-07 DIAGNOSIS — I10 ESSENTIAL HYPERTENSION: ICD-10-CM

## 2019-09-09 RX ORDER — LISINOPRIL 30 MG/1
30 TABLET ORAL EVERY 12 HOURS SCHEDULED
Qty: 180 TABLET | Refills: 1 | Status: SHIPPED | OUTPATIENT
Start: 2019-09-09 | End: 2019-12-16 | Stop reason: SDUPTHER

## 2019-10-25 ENCOUNTER — OFFICE VISIT (OUTPATIENT)
Dept: FAMILY MEDICINE CLINIC | Facility: CLINIC | Age: 49
End: 2019-10-25

## 2019-10-25 VITALS
BODY MASS INDEX: 42.07 KG/M2 | SYSTOLIC BLOOD PRESSURE: 106 MMHG | OXYGEN SATURATION: 98 % | WEIGHT: 261.8 LBS | HEART RATE: 63 BPM | DIASTOLIC BLOOD PRESSURE: 74 MMHG | HEIGHT: 66 IN

## 2019-10-25 DIAGNOSIS — E66.01 MORBID OBESITY (HCC): ICD-10-CM

## 2019-10-25 DIAGNOSIS — Z23 NEEDS FLU SHOT: ICD-10-CM

## 2019-10-25 DIAGNOSIS — I10 ESSENTIAL HYPERTENSION: Primary | ICD-10-CM

## 2019-10-25 PROCEDURE — 99213 OFFICE O/P EST LOW 20 MIN: CPT | Performed by: FAMILY MEDICINE

## 2019-10-25 PROCEDURE — 90686 IIV4 VACC NO PRSV 0.5 ML IM: CPT | Performed by: FAMILY MEDICINE

## 2019-10-25 PROCEDURE — 90471 IMMUNIZATION ADMIN: CPT | Performed by: FAMILY MEDICINE

## 2019-11-25 NOTE — PROGRESS NOTES
Subjective   Valente Arndt Jr. is a 49 y.o. male.     Chief Complaint   Patient presents with   • Hypertension f/u       History of Present Illness     Valente Arndt Jr. presents today for   Chief Complaint   Patient presents with   • Hypertension f/u        he is in need of chronic followup. he is currently stable. Compliance with prescribed medications is adequate. he reports no side effects from medications. he has been adherent to changes as prescribed in last OP visit.  He is taking lisinopril 30 mg twice daily, and has been stable on this dosage for the last few months.  He denies any chest pain or shortness of breath.  He has been doing good bring his weight down, but plateaued and has regained about 10 pounds over the last 3 months.  He attributes this to change in weather.    This patient is accompanied by their self who contributes to the history of their care.    The following portions of the patient's history were reviewed and updated as appropriate: allergies, current medications, past family history, past medical history, past social history, past surgical history and problem list.    Active Ambulatory Problems     Diagnosis Date Noted   • ICH (intracerebral hemorrhage) (CMS/[facility]) 08/04/2018   • Essential hypertension 08/05/2018   • Class 3 severe obesity due to excess calories with serious comorbidity and body mass index (BMI) of 45.0 to 49.9 in adult (CMS/[facility]) 08/05/2018   • Pure hypercholesterolemia 10/08/2018   • Morbid obesity (CMS/[facility]) 08/06/2019     Resolved Ambulatory Problems     Diagnosis Date Noted   • H/O noncompliance with medical treatment, presenting hazards to health 08/05/2018   • Other constipation 08/08/2018   • Acute calculous cholecystitis 08/06/2019     Past Medical History:   Diagnosis Date   • Hypertension    • Left Intracranial hemorrhage (CMS/[facility])    • Stroke (CMS/[facility])      Past Surgical History:   Procedure Laterality Date   • APPENDECTOMY      Open   •  "CHOLECYSTECTOMY N/A 8/7/2019    Procedure: CHOLECYSTECTOMY LAPAROSCOPIC;  Surgeon: Tawanna Hemphill MD;  Location: [facility] OR;  Service: General   • FOOT SURGERY     • WISDOM TOOTH EXTRACTION       Family History   Problem Relation Age of Onset   • Obesity Mother    • Hypertension Father    • Kidney disease Father    • Heart murmur Father    • No Known Problems Daughter    • No Known Problems Daughter      Social History     Socioeconomic History   • Marital status: Single     Spouse name: Not on file   • Number of children: Not on file   • Years of education: Not on file   • Highest education level: Not on file   Tobacco Use   • Smoking status: Never Smoker   • Smokeless tobacco: Never Used   Substance and Sexual Activity   • Alcohol use: Yes     Comment: rare occasion   • Drug use: No   • Sexual activity: No     Partners: Female   Social History Narrative    Lives with daughter and mother. Works at [employer].        Review of Systems  Review of Systems -  General ROS: negative for - chills, fever or night sweats  Cardiovascular ROS: no chest pain or dyspnea on exertion  Gastrointestinal ROS: no abdominal pain, change in bowel habits, or black or bloody stools  Genito-Urinary ROS: trouble voiding or gross hematuria    Objective   Blood pressure 106/74, pulse 63, height 167.6 cm (65.98\"), weight 119 kg (261 lb 12.8 oz), SpO2 98 %.  Nursing note reviewed  Physical Exam  Const: NAD, A&Ox4, Pleasant, Cooperative  Eyes: EOMI, no conjunctivitis  ENT: No nasal discharge present, neck supple  Cardiac: Regular rate and rhythm, no cyanosis  Resp: Respiratory rate within normal limits, no increased work of breathing, no audible wheezing or retractions noted  GI: No distention or ascites  Neurologic: CN II-XII grossly intact  Psych: Appropriate mood and behavior.  Skin: Warm, dry  Procedures  Assessment/Plan     Problem List Items Addressed This Visit        Cardiovascular and Mediastinum    Essential hypertension - Primary       " Digestive    Morbid obesity (CMS/HCC)      Other Visit Diagnoses     Needs flu shot        Relevant Orders    Fluarix/Fluzone/Afluria/FluLaval ([id number]) (Completed)        #1 hypertension  At goal today 106/74 extremely well controlled on lisinopril 30 mg twice daily  Very tight blood pressure control is necessary given his history of intracerebral hemorrhage presumably due to hypertension  Continue current regimen    #2 obesity  He had been doing quite well with revised diet and exercise plan, he has unfortunately regained about 10 pounds over the last few months, needs to lower activity with the weather change in season change.  We have discussed joining a gym, I recommended [employer] for $10 per month or the [employer] which has need-based pricing available    See patient diagnoses and orders along with patient instructions for assessment, plan, and changes to care for patient.    There are no Patient Instructions on file for this visit.    Return in about 6 months (around 4/25/2020) for Annual, (fasting blood work 1 week prior to appt).    Ambulatory progress note signed and attested to by Raoul Anthony D.O.

## 2019-12-16 DIAGNOSIS — E66.01 MORBID OBESITY (HCC): ICD-10-CM

## 2019-12-16 DIAGNOSIS — I10 ESSENTIAL HYPERTENSION: ICD-10-CM

## 2019-12-16 DIAGNOSIS — I61.0 NONTRAUMATIC SUBCORTICAL HEMORRHAGE OF LEFT CEREBRAL HEMISPHERE (HCC): ICD-10-CM

## 2019-12-17 RX ORDER — LISINOPRIL 30 MG/1
30 TABLET ORAL EVERY 12 HOURS SCHEDULED
Qty: 180 TABLET | Refills: 1 | Status: SHIPPED | OUTPATIENT
Start: 2019-12-17 | End: 2020-03-19 | Stop reason: SDUPTHER

## 2020-03-19 DIAGNOSIS — E66.01 MORBID OBESITY (HCC): ICD-10-CM

## 2020-03-19 DIAGNOSIS — I61.0 NONTRAUMATIC SUBCORTICAL HEMORRHAGE OF LEFT CEREBRAL HEMISPHERE (HCC): ICD-10-CM

## 2020-03-19 DIAGNOSIS — I10 ESSENTIAL HYPERTENSION: ICD-10-CM

## 2020-03-19 RX ORDER — LISINOPRIL 30 MG/1
30 TABLET ORAL EVERY 12 HOURS SCHEDULED
Qty: 180 TABLET | Refills: 1 | Status: SHIPPED | OUTPATIENT
Start: 2020-03-19 | End: 2020-06-19 | Stop reason: SDUPTHER

## 2020-05-06 ENCOUNTER — OFFICE VISIT (OUTPATIENT)
Dept: FAMILY MEDICINE CLINIC | Facility: CLINIC | Age: 50
End: 2020-05-06

## 2020-05-06 VITALS
HEART RATE: 60 BPM | WEIGHT: 272 LBS | DIASTOLIC BLOOD PRESSURE: 80 MMHG | HEIGHT: 66 IN | BODY MASS INDEX: 43.71 KG/M2 | SYSTOLIC BLOOD PRESSURE: 120 MMHG | OXYGEN SATURATION: 98 % | TEMPERATURE: 98.4 F

## 2020-05-06 DIAGNOSIS — I61.0 NONTRAUMATIC SUBCORTICAL HEMORRHAGE OF LEFT CEREBRAL HEMISPHERE (HCC): ICD-10-CM

## 2020-05-06 DIAGNOSIS — Z12.11 COLON CANCER SCREENING: ICD-10-CM

## 2020-05-06 DIAGNOSIS — Z00.00 PREVENTATIVE HEALTH CARE: Primary | ICD-10-CM

## 2020-05-06 DIAGNOSIS — E78.00 PURE HYPERCHOLESTEROLEMIA: ICD-10-CM

## 2020-05-06 DIAGNOSIS — I10 ESSENTIAL HYPERTENSION: ICD-10-CM

## 2020-05-06 DIAGNOSIS — E66.01 MORBID OBESITY (HCC): ICD-10-CM

## 2020-05-06 PROCEDURE — 99396 PREV VISIT EST AGE 40-64: CPT | Performed by: FAMILY MEDICINE

## 2020-05-06 PROCEDURE — 93000 ELECTROCARDIOGRAM COMPLETE: CPT | Performed by: FAMILY MEDICINE

## 2020-05-06 RX ORDER — NAPROXEN SODIUM 220 MG
TABLET ORAL
COMMUNITY
Start: 2018-08-10

## 2020-05-06 NOTE — PROGRESS NOTES
Subjective   Valente Arndt Jr. is a 50 y.o. male.     Chief Complaint   Patient presents with   • Annual Exam       History of Present Illness     Valente Arndt Jr. presents today for annual physical exam and preventative care.  He is a pleasant 50-year-old male with a history of CVA/nontraumatic intracranial hemorrhage in August 2018.  Over the subsequent year he was able to lose over 70 pounds from a peak weight of 320 pounds, and his blood pressure showed increasingly improved control as he has increased his exercise and lost weight.  He was able to reduce his medication burden from lisinopril and hydralazine combination to lisinopril monotherapy.  He has some very mild orthostatic hypotension with the lisinopril, but also admits that his water intake is fairly low.  His weight loss has stagnated somewhat over the last 6 months.  He works at [employer] and has continued working during the pandemic.  He had a granddaughter born since his last visit in October, she is now about 7 months old.    This patient is accompanied by their self who contributes to the history of their care.    The following portions of the patient's history were reviewed and updated as appropriate: allergies, current medications, past family history, past medical history, past social history, past surgical history and problem list.    Active Ambulatory Problems     Diagnosis Date Noted   • ICH (intracerebral hemorrhage) (CMS/[facility]) 08/04/2018   • Essential hypertension 08/05/2018   • Class 3 severe obesity due to excess calories with serious comorbidity and body mass index (BMI) of 45.0 to 49.9 in adult (CMS/[facility]) 08/05/2018   • Pure hypercholesterolemia 10/08/2018   • Morbid obesity (CMS/[facility]) 08/06/2019     Resolved Ambulatory Problems     Diagnosis Date Noted   • H/O noncompliance with medical treatment, presenting hazards to health 08/05/2018   • Other constipation 08/08/2018   • Acute calculous cholecystitis 08/06/2019     Past  "Medical History:   Diagnosis Date   • Hypertension    • Left Intracranial hemorrhage (CMS/HCC)    • Stroke (CMS/HCC)      Past Surgical History:   Procedure Laterality Date   • APPENDECTOMY      Open   • CHOLECYSTECTOMY N/A 8/7/2019    Procedure: CHOLECYSTECTOMY LAPAROSCOPIC;  Surgeon: Tawanna Hemphill MD;  Location: [facility];  Service: General   • FOOT SURGERY     • WISDOM TOOTH EXTRACTION       Family History   Problem Relation Age of Onset   • Obesity Mother    • Hypertension Father    • Kidney disease Father    • Heart murmur Father    • No Known Problems Daughter    • No Known Problems Daughter      Social History     Socioeconomic History   • Marital status: Single     Spouse name: Not on file   • Number of children: Not on file   • Years of education: Not on file   • Highest education level: Not on file   Tobacco Use   • Smoking status: Never Smoker   • Smokeless tobacco: Never Used   Substance and Sexual Activity   • Alcohol use: Yes     Comment: rare occasion   • Drug use: No   • Sexual activity: Never     Partners: Female   Social History Narrative    Lives with daughter and mother. Works at [employer].        Review of Systems  Review of Systems -  General ROS: negative for - chills, fever or night sweats  Cardiovascular ROS: no chest pain or dyspnea on exertion  Gastrointestinal ROS: no abdominal pain, change in bowel habits, or black or bloody stools  Genito-Urinary ROS: no dysuria, trouble voiding, or hematuria    Objective   Blood pressure 120/80, pulse 60, temperature 98.4 °F (36.9 °C), height 167.6 cm (65.98\"), weight 123 kg (272 lb), SpO2 98 %.  Nursing note reviewed  Physical Exam  General: Patient is well-nourished, well-developed, and in no acute distress.  He is morbidly obese  HEENT: Normocephalic with no contusions noted, no ptosis or palsy. Pupils equally round and reactive to light, extraocular movements intact. Patient holds steady gaze, can follow to midline. Hearing grossly normal without " deficit, exterior auricles normal, tympanic membranes normal without erythema or bulging.  Lymphatic: Posterior auricular, cervical, submandibular, supraclavicular, axillary lymphatic sites palpated without abnormality  Cardiovascular: Normal study rate without ectopy. PMI palpated, normal. Normal S1, S2. No murmurs rubs or gallops.  Respiratory: No tenderness to palpation on the chest wall, lungs clear to auscultation bilaterally, no wheezes, rales, or rhonchi. No pleural friction rubs.  Gastrointestinal: Bowel sounds present, normoactive globally. No bruit noted. Nontender, nondistended. Normal percussive exam, no hepatomegaly, no splenomegaly. No hernias, scars, gross lesions.  Extremities: No cyanosis or edema, 2+ pulses bilaterally, reflexes normal. Capillary refill time normal.  MSK: Normal gait and station. 5/5 strength globally.  Neuro: Cranial nerves II-XII grossly intact. Patient alert and oriented x3.  Skin: He has a skin tag on his right upper eyelid which does not obstruct his field of vision  PHQ-9 Depression Screening  Little interest or pleasure in doing things? 0   Feeling down, depressed, or hopeless? 0   Trouble falling or staying asleep, or sleeping too much?     Feeling tired or having little energy?     Poor appetite or overeating?     Feeling bad about yourself - or that you are a failure or have let yourself or your family down?     Trouble concentrating on things, such as reading the newspaper or watching television?     Moving or speaking so slowly that other people could have noticed? Or the opposite - being so fidgety or restless that you have been moving around a lot more than usual?     Thoughts that you would be better off dead, or of hurting yourself in some way?     PHQ-9 Total Score 0   If you checked off any problems, how difficult have these problems made it for you to do your work, take care of things at home, or get along with other people?         ECG 12 Lead  Date/Time:  5/6/2020 11:42 AM  Performed by: Raoul Anthony DO  Authorized by: Raoul Anthony DO   Comparison: compared with previous ECG   Similar to previous ECG  Rhythm: sinus bradycardia  Rate: bradycardic  Conduction: conduction normal  Conduction: 1st degree AV block and non-specific intraventricular conduction delay  QRS axis: normal  Other: no other findings    Clinical impression: abnormal EKG  Comments: Stable and baseline EKG          Assessment/Plan   Problem List Items Addressed This Visit        Cardiovascular and Mediastinum    Essential hypertension    Relevant Orders    ECG 12 Lead    Pure hypercholesterolemia       Digestive    Morbid obesity (CMS/HCC)       Nervous and Auditory    ICH (intracerebral hemorrhage) (CMS/HCC)      Other Visit Diagnoses     Preventative health care    -  Primary    Relevant Orders    ECG 12 Lead    Ambulatory Referral For Screening Colonoscopy    Colon cancer screening        Relevant Orders    Ambulatory Referral For Screening Colonoscopy          See patient diagnoses and orders along with patient instructions for assessment, plan, and changes to care for patient.    The preventative exam has been reviewed in detail.  The patient has been fully counseled on preventative guidelines for vaccines, cancer screenings, and other health maintenance needs. The patient was counseled on maintaining a lifestyle to promote good health and to minimize chronic diseases.  The patient has been assisted with scheduling healthcare procedures for the coming year and given a written document outlining these recommendations. Age-appropriate screening measures have been ordered for the patient today as indicated above.    Patient Instructions   General Health Maintenance:  -Yearly dermatology exam  -Yearly eye exam if you are diabetic, otherwise every 2 years for patients without diabetes  -Dental exams/cleanings at least twice a year  -Staying current on your required colonoscopy, starts  "at age 50 unless there are other indications prior  -Prostate exam either manually in office or prostate specific antigen lab testing yearly starting at 55--this should be discussed before proceeding    Vaccines:  -Talk to pharmacist about shingrix shot  -Obtain flu shot when available  -Health department is recommending Hepatitis A vaccine    The largest impact you can have on your own health is getting the proper nutrition, exercise and maintaining an overall healthy body weight.  Proper nutrition involves eating lots of vegetables, fruit, minimal lean meat and avoiding processed foods and limiting refined sugars, carbohydrates and starches (\"the white stuff\").  Drinking at least 8 cups of water daily.  Walking at least 30 minutes a day and if possible, increasing this to a more cardiovascular aerobic exercise.  Maintaining a healthy body weight.      If you consume alcohol, limit your alcohol intake to 2 drinks a day for men and 1 drink a day for women.    It is also important to make sure to keep regular appointments and have all general health maintenance items completed in a timely manner.      Thank you for entrusting me with your care.  It is a pleasure and honor to participate in your health.              No follow-ups on file.    Ambulatory progress note signed and attested to by Raoul Anthony D.O.           Current Outpatient Medications:   •  lisinopril (PRINIVIL,ZESTRIL) 30 MG tablet, Take 1 tablet by mouth Every 12 (Twelve) Hours., Disp: 180 tablet, Rfl: 1  •  naproxen sodium (ALEVE) 220 MG tablet,  See Instructions, 0 Refill(s), PRN, Disp: , Rfl:        "

## 2020-05-06 NOTE — PATIENT INSTRUCTIONS
"General Health Maintenance:  -Yearly dermatology exam  -Yearly eye exam if you are diabetic, otherwise every 2 years for patients without diabetes  -Dental exams/cleanings at least twice a year  -Staying current on your required colonoscopy, starts at age 50 unless there are other indications prior  -Prostate exam either manually in office or prostate specific antigen lab testing yearly starting at 55--this should be discussed before proceeding    Vaccines:  -Talk to pharmacist about shingrix shot  -Obtain flu shot when available  -Health department is recommending Hepatitis A vaccine    The largest impact you can have on your own health is getting the proper nutrition, exercise and maintaining an overall healthy body weight.  Proper nutrition involves eating lots of vegetables, fruit, minimal lean meat and avoiding processed foods and limiting refined sugars, carbohydrates and starches (\"the white stuff\").  Drinking at least 8 cups of water daily.  Walking at least 30 minutes a day and if possible, increasing this to a more cardiovascular aerobic exercise.  Maintaining a healthy body weight.      If you consume alcohol, limit your alcohol intake to 2 drinks a day for men and 1 drink a day for women.    It is also important to make sure to keep regular appointments and have all general health maintenance items completed in a timely manner.      Thank you for entrusting me with your care.  It is a pleasure and honor to participate in your health.          "

## 2020-06-10 DIAGNOSIS — Z12.11 SCREENING FOR COLON CANCER: Primary | ICD-10-CM

## 2020-06-10 RX ORDER — SODIUM, POTASSIUM,MAG SULFATES 17.5-3.13G
1 SOLUTION, RECONSTITUTED, ORAL ORAL TAKE AS DIRECTED
Qty: 2 BOTTLE | Refills: 0 | Status: SHIPPED | OUTPATIENT
Start: 2020-06-10 | End: 2020-11-11

## 2020-06-12 ENCOUNTER — APPOINTMENT (OUTPATIENT)
Dept: PREADMISSION TESTING | Facility: HOSPITAL | Age: 50
End: 2020-06-12

## 2020-06-12 PROCEDURE — C9803 HOPD COVID-19 SPEC COLLECT: HCPCS

## 2020-06-12 PROCEDURE — U0002 COVID-19 LAB TEST NON-CDC: HCPCS

## 2020-06-12 PROCEDURE — U0004 COV-19 TEST NON-CDC HGH THRU: HCPCS

## 2020-06-13 LAB
REF LAB TEST METHOD: NORMAL
SARS-COV-2 RNA RESP QL NAA+PROBE: NOT DETECTED

## 2020-06-15 ENCOUNTER — LAB REQUISITION (OUTPATIENT)
Dept: LAB | Facility: HOSPITAL | Age: 50
End: 2020-06-15

## 2020-06-15 ENCOUNTER — OUTSIDE FACILITY SERVICE (OUTPATIENT)
Dept: GASTROENTEROLOGY | Facility: CLINIC | Age: 50
End: 2020-06-15

## 2020-06-15 DIAGNOSIS — Z12.11 ENCOUNTER FOR SCREENING FOR MALIGNANT NEOPLASM OF COLON: ICD-10-CM

## 2020-06-15 DIAGNOSIS — Z80.0 FAMILY HISTORY OF MALIGNANT NEOPLASM OF DIGESTIVE ORGANS: ICD-10-CM

## 2020-06-15 PROCEDURE — 45385 COLONOSCOPY W/LESION REMOVAL: CPT | Performed by: INTERNAL MEDICINE

## 2020-06-15 PROCEDURE — 88305 TISSUE EXAM BY PATHOLOGIST: CPT | Performed by: INTERNAL MEDICINE

## 2020-06-17 ENCOUNTER — TELEPHONE (OUTPATIENT)
Dept: GASTROENTEROLOGY | Facility: CLINIC | Age: 50
End: 2020-06-17

## 2020-06-17 NOTE — TELEPHONE ENCOUNTER
I called and left a message regarding the pathology.  Patient did have one adenoma type polyp and will need a repeat exam in 5 years.

## 2020-06-19 DIAGNOSIS — I10 ESSENTIAL HYPERTENSION: ICD-10-CM

## 2020-06-19 DIAGNOSIS — E66.01 MORBID OBESITY (HCC): ICD-10-CM

## 2020-06-19 DIAGNOSIS — I61.0 NONTRAUMATIC SUBCORTICAL HEMORRHAGE OF LEFT CEREBRAL HEMISPHERE (HCC): ICD-10-CM

## 2020-06-22 RX ORDER — LISINOPRIL 30 MG/1
30 TABLET ORAL EVERY 12 HOURS SCHEDULED
Qty: 180 TABLET | Refills: 0 | Status: SHIPPED | OUTPATIENT
Start: 2020-06-22 | End: 2020-11-11 | Stop reason: DRUGHIGH

## 2020-11-11 ENCOUNTER — OFFICE VISIT (OUTPATIENT)
Dept: FAMILY MEDICINE CLINIC | Facility: CLINIC | Age: 50
End: 2020-11-11

## 2020-11-11 VITALS
HEIGHT: 66 IN | BODY MASS INDEX: 41.78 KG/M2 | OXYGEN SATURATION: 98 % | DIASTOLIC BLOOD PRESSURE: 64 MMHG | HEART RATE: 69 BPM | SYSTOLIC BLOOD PRESSURE: 102 MMHG | WEIGHT: 260 LBS

## 2020-11-11 DIAGNOSIS — E66.01 MORBID OBESITY (HCC): ICD-10-CM

## 2020-11-11 DIAGNOSIS — I10 ESSENTIAL HYPERTENSION: Primary | ICD-10-CM

## 2020-11-11 DIAGNOSIS — I61.0 NONTRAUMATIC SUBCORTICAL HEMORRHAGE OF LEFT CEREBRAL HEMISPHERE (HCC): ICD-10-CM

## 2020-11-11 PROCEDURE — 99213 OFFICE O/P EST LOW 20 MIN: CPT | Performed by: FAMILY MEDICINE

## 2020-11-11 RX ORDER — LISINOPRIL 20 MG/1
20 TABLET ORAL EVERY 12 HOURS SCHEDULED
Qty: 180 TABLET | Refills: 3 | Status: SHIPPED | OUTPATIENT
Start: 2020-11-11 | End: 2021-12-06 | Stop reason: SDUPTHER

## 2020-11-23 NOTE — PROGRESS NOTES
Subjective   Valente Arndt Jr. is a 50 y.o. male.     Chief Complaint   Patient presents with   • Hypertension     6 mth f/u       Hypertension  This is a recurrent problem. The current episode started more than 1 year ago. The problem has been gradually improving since onset. The problem is controlled. Pertinent negatives include no anxiety, blurred vision, chest pain, headaches, malaise/fatigue, neck pain, orthopnea, palpitations, peripheral edema, PND, shortness of breath or sweats. Risk factors for coronary artery disease include family history and obesity. There are no compliance problems.         Valente Arndt Jr. presents today for   Chief Complaint   Patient presents with   • Hypertension     6 mth f/u     he is in need of chronic disease followup. he denies acute complaints today.  He has been able to lose some significant weight with diet and exercise since his stroke couple years ago.    This patient is accompanied by their self who contributes to the history of their care.    The following portions of the patient's history were reviewed and updated as appropriate: allergies, current medications, past family history, past medical history, past social history, past surgical history and problem list.    Active Ambulatory Problems     Diagnosis Date Noted   • ICH (intracerebral hemorrhage) (CMS/HCC) 08/04/2018   • Essential hypertension 08/05/2018   • Class 3 severe obesity due to excess calories with serious comorbidity and body mass index (BMI) of 45.0 to 49.9 in adult (CMS/[facility]) 08/05/2018   • Pure hypercholesterolemia 10/08/2018   • Morbid obesity (CMS/HCC) 08/06/2019     Resolved Ambulatory Problems     Diagnosis Date Noted   • H/O noncompliance with medical treatment, presenting hazards to health 08/05/2018   • Other constipation 08/08/2018   • Acute calculous cholecystitis 08/06/2019     Past Medical History:   Diagnosis Date   • Hypertension    • Left Intracranial hemorrhage (CMS/HCC)    •  "Stroke (CMS/[facility])      Past Surgical History:   Procedure Laterality Date   • APPENDECTOMY      Open   • CHOLECYSTECTOMY N/A 8/7/2019    Procedure: CHOLECYSTECTOMY LAPAROSCOPIC;  Surgeon: Tawanna Hemphill MD;  Location: [facility];  Service: General   • FOOT SURGERY     • WISDOM TOOTH EXTRACTION       Family History   Problem Relation Age of Onset   • Obesity Mother    • Hypertension Father    • Kidney disease Father    • Heart murmur Father    • No Known Problems Daughter    • No Known Problems Daughter      Social History     Socioeconomic History   • Marital status: Single     Spouse name: Not on file   • Number of children: Not on file   • Years of education: Not on file   • Highest education level: Not on file   Tobacco Use   • Smoking status: Never Smoker   • Smokeless tobacco: Never Used   Substance and Sexual Activity   • Alcohol use: Yes     Comment: rare occasion   • Drug use: No   • Sexual activity: Never     Partners: Female   Social History Narrative    Lives with daughter and mother. Works at [employer].        Review of Systems   Constitutional: Negative for malaise/fatigue.   Eyes: Negative for blurred vision.   Respiratory: Negative for shortness of breath.    Cardiovascular: Negative for chest pain, palpitations, orthopnea and PND.   Musculoskeletal: Negative for neck pain.   Neurological: Negative for headaches.     Review of Systems -  General ROS: negative for - chills, fever or night sweats  Cardiovascular ROS: no chest pain or dyspnea on exertion  Gastrointestinal ROS: no abdominal pain, change in bowel habits, or black or bloody stools  Genito-Urinary ROS: no trouble voiding or gross hematuria    Objective   Blood pressure 102/64, pulse 69, height 167.6 cm (65.98\"), weight 118 kg (260 lb), SpO2 98 %.  Nursing note reviewed  Physical Exam  Const: NAD, A&Ox4, Pleasant, Cooperative  Eyes: EOMI, no conjunctivitis  ENT: No nasal discharge present, neck supple  Cardiac: Regular rate and rhythm, no " cyanosis  Resp: Respiratory rate within normal limits, no increased work of breathing, no audible wheezing or retractions noted  GI: No distention or ascites  Procedures  Assessment/Plan     Problem List Items Addressed This Visit        Cardiovascular and Mediastinum    Essential hypertension - Primary    Overview     With his significant weight loss and dedication to diet and activity, he is starting to become overcontrolled on his blood pressure.  I like him to reduce his lisinopril to 20 mg twice daily from his current 30 mg twice daily.         Relevant Medications    lisinopril (PRINIVIL,ZESTRIL) 20 MG tablet       Digestive    Morbid obesity (CMS/HCC)    Relevant Medications    lisinopril (PRINIVIL,ZESTRIL) 20 MG tablet       Nervous and Auditory    ICH (intracerebral hemorrhage) (CMS/HCC)    Relevant Medications    lisinopril (PRINIVIL,ZESTRIL) 20 MG tablet        See patient diagnoses and orders along with patient instructions for assessment, plan, and changes to care for patient.    Patient Instructions   1.  Reduce lisinopril to 20mg twice daily    2.  Continue with Noom      Return in about 6 months (around 5/11/2021) for Annual.    Ambulatory progress note signed and attested to by Raoul Anthony D.O.

## 2021-06-08 ENCOUNTER — OFFICE VISIT (OUTPATIENT)
Dept: FAMILY MEDICINE CLINIC | Facility: CLINIC | Age: 51
End: 2021-06-08

## 2021-06-08 VITALS
BODY MASS INDEX: 41.79 KG/M2 | SYSTOLIC BLOOD PRESSURE: 120 MMHG | HEIGHT: 65 IN | WEIGHT: 250.8 LBS | OXYGEN SATURATION: 96 % | HEART RATE: 66 BPM | RESPIRATION RATE: 16 BRPM | DIASTOLIC BLOOD PRESSURE: 80 MMHG

## 2021-06-08 DIAGNOSIS — R73.01 FASTING HYPERGLYCEMIA: ICD-10-CM

## 2021-06-08 DIAGNOSIS — Z00.00 PREVENTATIVE HEALTH CARE: Primary | ICD-10-CM

## 2021-06-08 DIAGNOSIS — E66.01 CLASS 3 SEVERE OBESITY DUE TO EXCESS CALORIES WITH SERIOUS COMORBIDITY AND BODY MASS INDEX (BMI) OF 40.0 TO 44.9 IN ADULT (HCC): ICD-10-CM

## 2021-06-08 DIAGNOSIS — I10 ESSENTIAL HYPERTENSION: ICD-10-CM

## 2021-06-08 DIAGNOSIS — E78.00 PURE HYPERCHOLESTEROLEMIA: ICD-10-CM

## 2021-06-08 PROCEDURE — 99396 PREV VISIT EST AGE 40-64: CPT | Performed by: FAMILY MEDICINE

## 2021-06-08 NOTE — PROGRESS NOTES
Subjective   Valente Arndt Jr. is a 51 y.o. male.     Chief Complaint   Patient presents with   • Annual Exam       History of Present Illness     Valente Arndt Jr. presents today for annual physical exam and preventative care.    The patient presents today for annual physical exam and preventative care. He has a complicated medical history significant for a cerebrovascular accident secondary to uncontrolled hypertension in 2018. Since then, he has been working on weight loss and blood pressure control and has actually been able to do quite well. He has come down from a combination of hydralazine and lisinopril and is now maintained on a lower dose of lisinopril 20 mg twice daily. At his last visit, he mentioned that he was working with Noom for ongoing weight loss.    The patient reports that things have been going well for the last 6 months. He is no longer using Noom due to switching jobs and his new schedule made it hard to keep up with. He is still using some of what he learned from the diet. The patient is taking 20 mg twice daily of lisinopril.     He denies any new concerns, headaches, vision changes, shortness of breath, chest pain, constipation, or diarrhea. He denies any sexual dysfunction, blood in his urine or stool. The patient denies ear pain or dizziness. His last visit to the dentist was in 02/2021. He denies using any nasal spray for allergies. The patient is walking a little bit every day. He denies any swelling in his hands or feet. His last eye exam was within the last couple of years.    This patient is accompanied by their self who contributes to the history of their care.    The following portions of the patient's history were reviewed and updated as appropriate: allergies, current medications, past family history, past medical history, past social history, past surgical history and problem list.    Active Ambulatory Problems     Diagnosis Date Noted   • ICH (intracerebral  "hemorrhage) (CMS/[facility]) 08/04/2018   • Essential hypertension 08/05/2018   • Class 3 severe obesity due to excess calories with serious comorbidity and body mass index (BMI) of 45.0 to 49.9 in adult (CMS/[facility]) 08/05/2018   • Pure hypercholesterolemia 10/08/2018   • Morbid obesity (CMS/[facility]) 08/06/2019     Resolved Ambulatory Problems     Diagnosis Date Noted   • H/O noncompliance with medical treatment, presenting hazards to health 08/05/2018   • Other constipation 08/08/2018   • Acute calculous cholecystitis 08/06/2019     Past Medical History:   Diagnosis Date   • Hypertension    • Left Intracranial hemorrhage (CMS/[facility])    • Stroke (CMS/[facility])      Past Surgical History:   Procedure Laterality Date   • APPENDECTOMY      Open   • CHOLECYSTECTOMY N/A 8/7/2019    Procedure: CHOLECYSTECTOMY LAPAROSCOPIC;  Surgeon: Tawanna Hemphill MD;  Location: [facility];  Service: General   • FOOT SURGERY     • WISDOM TOOTH EXTRACTION       Family History   Problem Relation Age of Onset   • Obesity Mother    • Hypertension Father    • Kidney disease Father    • Heart murmur Father    • No Known Problems Daughter    • No Known Problems Daughter      Social History     Socioeconomic History   • Marital status: Single     Spouse name: Not on file   • Number of children: Not on file   • Years of education: Not on file   • Highest education level: Not on file   Tobacco Use   • Smoking status: Never Smoker   • Smokeless tobacco: Never Used   Substance and Sexual Activity   • Alcohol use: Yes     Comment: rare occasion   • Drug use: No   • Sexual activity: Never     Partners: Female       Review of Systems  See Physical ROS scanned into chart    Objective   Blood pressure 120/80, pulse 66, resp. rate 16, height 165.1 cm (65\"), weight 114 kg (250 lb 12.8 oz), SpO2 96 %.  Nursing note reviewed  Physical Exam  General: Patient is well-nourished, well-developed, and in no acute distress.  HEENT: Normocephalic with no contusions noted, no ptosis or " palsy. Pupils equally round and reactive to light, extraocular movements intact. Patient holds steady gaze, can follow to midline. Hearing grossly normal without deficit, exterior auricles normal, tympanic membranes normal without erythema or bulging.  Lymphatic: Posterior auricular, cervical, submandibular, supraclavicular, axillary lymphatic sites palpated without abnormality  Cardiovascular: Normal study rate without ectopy. PMI palpated, normal. Normal S1, S2. No murmurs rubs or gallops.  Respiratory: No tenderness to palpation on the chest wall, lungs clear to auscultation bilaterally, no wheezes, rales, or rhonchi. No pleural friction rubs.  Gastrointestinal: Bowel sounds present, normoactive globally. No bruit noted. Nontender, nondistended. Normal percussive exam, no hepatomegaly, no splenomegaly. No hernias, scars, gross lesions.  Extremities: No cyanosis or edema, 2+ pulses bilaterally, reflexes normal. Capillary refill time normal.  MSK: Normal gait and station. 5/5 strength globally.  Neuro: Cranial nerves II-XII grossly intact. Patient alert and oriented x3.  PHQ-9 Depression Screening  Little interest or pleasure in doing things? 0   Feeling down, depressed, or hopeless? 0   Trouble falling or staying asleep, or sleeping too much?     Feeling tired or having little energy?     Poor appetite or overeating?     Feeling bad about yourself - or that you are a failure or have let yourself or your family down?     Trouble concentrating on things, such as reading the newspaper or watching television?     Moving or speaking so slowly that other people could have noticed? Or the opposite - being so fidgety or restless that you have been moving around a lot more than usual?     Thoughts that you would be better off dead, or of hurting yourself in some way?     PHQ-9 Total Score 0   If you checked off any problems, how difficult have these problems made it for you to do your work, take care of things at home, or  get along with other people?       Procedures  Assessment/Plan   Problem List Items Addressed This Visit        Cardiac and Vasculature    Essential hypertension    Overview     With his significant weight loss and dedication to diet and activity, he is starting to become overcontrolled on his blood pressure.  I like him to reduce his lisinopril to 20 mg twice daily from his current 30 mg twice daily.         Relevant Orders    Comprehensive Metabolic Panel    Urinalysis With Microscopic If Indicated (No Culture) - Urine, Clean Catch    TSH    Pure hypercholesterolemia    Relevant Orders    Lipid Panel      Other Visit Diagnoses     Preventative health care    -  Primary    Class 3 severe obesity due to excess calories with serious comorbidity and body mass index (BMI) of 40.0 to 44.9 in adult (CMS/[facility])        Relevant Orders    CBC (No Diff)    Vitamin D 25 Hydroxy    Fasting hyperglycemia        Relevant Orders    Hemoglobin A1c        1. Hypertension.  - His blood pressure has done well on the decreased dose of lisinopril 20 mg twice daily. This change was made at his visit last 11/2020.    2. Well adult exam.  - He is up to date on vaccinations. A metabolic screen was ordered today. His colonoscopy was completed in 06/2020.    3. Morbid obesity.  - He has been doing well with weight loss over the last couple of years. Although he did stop the Noom program, he has lost an additional 10 pounds since 11/2020. He was applauded for his efforts to this point and encouraged to continue.    4. Fasting hyperglycemia.  - He is in need of an A1c and fasting blood work ordered today.      See patient diagnoses and orders along with patient instructions for assessment, plan, and changes to care for patient.    The preventative exam has been reviewed in detail.  The patient has been fully counseled on preventative guidelines for vaccines, cancer screenings, and other health maintenance needs. The patient was counseled on  maintaining a lifestyle to promote good health and to minimize chronic diseases.  The patient has been assisted with scheduling healthcare procedures for the coming year and given a written document outlining these recommendations. Age-appropriate screening measures have been ordered for the patient today as indicated above.    Patient Instructions   Preventive Care 40-64 Years Old, Male  Preventive care refers to lifestyle choices and visits with your health care provider that can promote health and wellness. This includes:  · A yearly physical exam. This is also called an annual well check.  · Regular dental and eye exams.  · Immunizations.  · Screening for certain conditions.  · Healthy lifestyle choices, such as eating a healthy diet, getting regular exercise, not using drugs or products that contain nicotine and tobacco, and limiting alcohol use.  What can I expect for my preventive care visit?  Physical exam  Your health care provider will check:  · Height and weight. These may be used to calculate body mass index (BMI), which is a measurement that tells if you are at a healthy weight.  · Heart rate and blood pressure.  · Your skin for abnormal spots.  Counseling  Your health care provider may ask you questions about:  · Alcohol, tobacco, and drug use.  · Emotional well-being.  · Home and relationship well-being.  · Sexual activity.  · Eating habits.  · Work and work environment.  What immunizations do I need?    Influenza (flu) vaccine  · This is recommended every year.  Tetanus, diphtheria, and pertussis (Tdap) vaccine  · You may need a Td booster every 10 years.  Varicella (chickenpox) vaccine  · You may need this vaccine if you have not already been vaccinated.  Zoster (shingles) vaccine  · You may need this after age 60.  Measles, mumps, and rubella (MMR) vaccine  · You may need at least one dose of MMR if you were born in 1957 or later. You may also need a second dose.  Pneumococcal conjugate (PCV13)  vaccine  · You may need this if you have certain conditions and were not previously vaccinated.  Pneumococcal polysaccharide (PPSV23) vaccine  · You may need one or two doses if you smoke cigarettes or if you have certain conditions.  Meningococcal conjugate (MenACWY) vaccine  · You may need this if you have certain conditions.  Hepatitis A vaccine  · You may need this if you have certain conditions or if you travel or work in places where you may be exposed to hepatitis A.  Hepatitis B vaccine  · You may need this if you have certain conditions or if you travel or work in places where you may be exposed to hepatitis B.  Haemophilus influenzae type b (Hib) vaccine  · You may need this if you have certain risk factors.  Human papillomavirus (HPV) vaccine  · If recommended by your health care provider, you may need three doses over 6 months.  You may receive vaccines as individual doses or as more than one vaccine together in one shot (combination vaccines). Talk with your health care provider about the risks and benefits of combination vaccines.  What tests do I need?  Blood tests  · Lipid and cholesterol levels. These may be checked every 5 years, or more frequently if you are over 50 years old.  · Hepatitis C test.  · Hepatitis B test.  Screening  · Lung cancer screening. You may have this screening every year starting at age 55 if you have a 30-pack-year history of smoking and currently smoke or have quit within the past 15 years.  · Prostate cancer screening. Recommendations will vary depending on your family history and other risks.  · Colorectal cancer screening. All adults should have this screening starting at age 50 and continuing until age 75. Your health care provider may recommend screening at age 45 if you are at increased risk. You will have tests every 1-10 years, depending on your results and the type of screening test.  · Diabetes screening. This is done by checking your blood sugar (glucose) after  you have not eaten for a while (fasting). You may have this done every 1-3 years.  · Sexually transmitted disease (STD) testing.  Follow these instructions at home:  Eating and drinking  · Eat a diet that includes fresh fruits and vegetables, whole grains, lean protein, and low-fat dairy products.  · Take vitamin and mineral supplements as recommended by your health care provider.  · Do not drink alcohol if your health care provider tells you not to drink.  · If you drink alcohol:  ? Limit how much you have to 0-2 drinks a day.  ? Be aware of how much alcohol is in your drink. In the [place] one drink equals one 12 oz bottle of beer (355 mL), one 5 oz glass of wine (148 mL), or one 1½ oz glass of hard liquor (44 mL).  Lifestyle  · Take daily care of your teeth and gums.  · Stay active. Exercise for at least 30 minutes on 5 or more days each week.  · Do not use any products that contain nicotine or tobacco, such as cigarettes, e-cigarettes, and chewing tobacco. If you need help quitting, ask your health care provider.  · If you are sexually active, practice safe sex. Use a condom or other form of protection to prevent STIs (sexually transmitted infections).  · Talk with your health care provider about taking a low-dose aspirin every day starting at age 50.  What's next?  · Go to your health care provider once a year for a well check visit.  · Ask your health care provider how often you should have your eyes and teeth checked.  · Stay up to date on all vaccines.  This information is not intended to replace advice given to you by your health care provider. Make sure you discuss any questions you have with your health care provider.  Document Revised: 12/12/2019 Document Reviewed: 12/12/2019  [employer] Patient Education © 2020 [employer]        Return in about 6 months (around 12/8/2021) for HTN.    Ambulatory progress note signed and attested to by Raoul Anthony D.O.           Current Outpatient Medications:   •   lisinopril (PRINIVIL,ZESTRIL) 20 MG tablet, Take 1 tablet by mouth Every 12 (Twelve) Hours., Disp: 180 tablet, Rfl: 3  •  naproxen sodium (ALEVE) 220 MG tablet,  See Instructions, 0 Refill(s), PRN, Disp: , Rfl:        Scribed for Raoul Anthony DO by Genesis Kapoor.  06/08/21   15:48 EDT    I have personally performed the services described in this document as scribed by the above individual, and it is both accurate and complete.  Raoul Anthony DO  6/18/2021  13:26 EDT

## 2021-07-06 ENCOUNTER — LAB (OUTPATIENT)
Dept: LAB | Facility: HOSPITAL | Age: 51
End: 2021-07-06

## 2021-07-06 DIAGNOSIS — E78.00 PURE HYPERCHOLESTEROLEMIA: ICD-10-CM

## 2021-07-06 DIAGNOSIS — E66.01 CLASS 3 SEVERE OBESITY DUE TO EXCESS CALORIES WITH SERIOUS COMORBIDITY AND BODY MASS INDEX (BMI) OF 40.0 TO 44.9 IN ADULT (HCC): ICD-10-CM

## 2021-07-06 DIAGNOSIS — R73.01 FASTING HYPERGLYCEMIA: ICD-10-CM

## 2021-07-06 DIAGNOSIS — I10 ESSENTIAL HYPERTENSION: ICD-10-CM

## 2021-07-06 LAB
25(OH)D3 SERPL-MCNC: 31.4 NG/ML (ref 30–100)
ALBUMIN SERPL-MCNC: 3.9 G/DL (ref 3.5–5.2)
ALBUMIN/GLOB SERPL: 1.1 G/DL
ALP SERPL-CCNC: 70 U/L (ref 39–117)
ALT SERPL W P-5'-P-CCNC: 17 U/L (ref 1–41)
ANION GAP SERPL CALCULATED.3IONS-SCNC: 8 MMOL/L (ref 5–15)
AST SERPL-CCNC: 19 U/L (ref 1–40)
BILIRUB SERPL-MCNC: 0.6 MG/DL (ref 0–1.2)
BILIRUB UR QL STRIP: NEGATIVE
BUN SERPL-MCNC: 15 MG/DL (ref 6–20)
BUN/CREAT SERPL: 14.3 (ref 7–25)
CALCIUM SPEC-SCNC: 9.7 MG/DL (ref 8.6–10.5)
CHLORIDE SERPL-SCNC: 102 MMOL/L (ref 98–107)
CHOLEST SERPL-MCNC: 151 MG/DL (ref 0–200)
CLARITY UR: CLEAR
CO2 SERPL-SCNC: 27 MMOL/L (ref 22–29)
COLOR UR: YELLOW
CREAT SERPL-MCNC: 1.05 MG/DL (ref 0.76–1.27)
DEPRECATED RDW RBC AUTO: 42.8 FL (ref 37–54)
ERYTHROCYTE [DISTWIDTH] IN BLOOD BY AUTOMATED COUNT: 13.1 % (ref 12.3–15.4)
GFR SERPL CREATININE-BSD FRML MDRD: 74 ML/MIN/1.73
GLOBULIN UR ELPH-MCNC: 3.4 GM/DL
GLUCOSE SERPL-MCNC: 87 MG/DL (ref 65–99)
GLUCOSE UR STRIP-MCNC: NEGATIVE MG/DL
HBA1C MFR BLD: 5.64 % (ref 4.8–5.6)
HCT VFR BLD AUTO: 42.5 % (ref 37.5–51)
HDLC SERPL-MCNC: 45 MG/DL (ref 40–60)
HGB BLD-MCNC: 13.9 G/DL (ref 13–17.7)
HGB UR QL STRIP.AUTO: NEGATIVE
KETONES UR QL STRIP: NEGATIVE
LDLC SERPL CALC-MCNC: 94 MG/DL (ref 0–100)
LDLC/HDLC SERPL: 2.1 {RATIO}
LEUKOCYTE ESTERASE UR QL STRIP.AUTO: NEGATIVE
MCH RBC QN AUTO: 29.3 PG (ref 26.6–33)
MCHC RBC AUTO-ENTMCNC: 32.7 G/DL (ref 31.5–35.7)
MCV RBC AUTO: 89.7 FL (ref 79–97)
NITRITE UR QL STRIP: NEGATIVE
PH UR STRIP.AUTO: 7 [PH] (ref 5–8)
PLATELET # BLD AUTO: 273 10*3/MM3 (ref 140–450)
PMV BLD AUTO: 12.5 FL (ref 6–12)
POTASSIUM SERPL-SCNC: 4.3 MMOL/L (ref 3.5–5.2)
PROT SERPL-MCNC: 7.3 G/DL (ref 6–8.5)
PROT UR QL STRIP: NEGATIVE
RBC # BLD AUTO: 4.74 10*6/MM3 (ref 4.14–5.8)
SODIUM SERPL-SCNC: 137 MMOL/L (ref 136–145)
SP GR UR STRIP: 1.02 (ref 1–1.03)
TRIGL SERPL-MCNC: 57 MG/DL (ref 0–150)
TSH SERPL DL<=0.05 MIU/L-ACNC: 3.1 UIU/ML (ref 0.27–4.2)
UROBILINOGEN UR QL STRIP: NORMAL
VLDLC SERPL-MCNC: 12 MG/DL (ref 5–40)
WBC # BLD AUTO: 7.05 10*3/MM3 (ref 3.4–10.8)

## 2021-07-06 PROCEDURE — 83036 HEMOGLOBIN GLYCOSYLATED A1C: CPT

## 2021-07-06 PROCEDURE — 81003 URINALYSIS AUTO W/O SCOPE: CPT

## 2021-07-06 PROCEDURE — 85027 COMPLETE CBC AUTOMATED: CPT

## 2021-07-06 PROCEDURE — 80053 COMPREHEN METABOLIC PANEL: CPT

## 2021-07-06 PROCEDURE — 80061 LIPID PANEL: CPT

## 2021-07-06 PROCEDURE — 82306 VITAMIN D 25 HYDROXY: CPT

## 2021-07-06 PROCEDURE — 84443 ASSAY THYROID STIM HORMONE: CPT

## 2021-09-07 PROCEDURE — U0004 COV-19 TEST NON-CDC HGH THRU: HCPCS | Performed by: NURSE PRACTITIONER

## 2021-12-08 ENCOUNTER — OFFICE VISIT (OUTPATIENT)
Dept: FAMILY MEDICINE CLINIC | Facility: CLINIC | Age: 51
End: 2021-12-08

## 2021-12-08 VITALS
HEIGHT: 65 IN | WEIGHT: 253.6 LBS | DIASTOLIC BLOOD PRESSURE: 78 MMHG | RESPIRATION RATE: 16 BRPM | HEART RATE: 55 BPM | SYSTOLIC BLOOD PRESSURE: 118 MMHG | OXYGEN SATURATION: 97 % | BODY MASS INDEX: 42.25 KG/M2

## 2021-12-08 DIAGNOSIS — I61.0 NONTRAUMATIC SUBCORTICAL HEMORRHAGE OF LEFT CEREBRAL HEMISPHERE (HCC): ICD-10-CM

## 2021-12-08 DIAGNOSIS — E66.01 MORBID OBESITY (HCC): ICD-10-CM

## 2021-12-08 DIAGNOSIS — I10 ESSENTIAL HYPERTENSION: ICD-10-CM

## 2021-12-08 DIAGNOSIS — Z23 NEED FOR INFLUENZA VACCINATION: Primary | ICD-10-CM

## 2021-12-08 PROCEDURE — 90686 IIV4 VACC NO PRSV 0.5 ML IM: CPT | Performed by: FAMILY MEDICINE

## 2021-12-08 PROCEDURE — 90471 IMMUNIZATION ADMIN: CPT | Performed by: FAMILY MEDICINE

## 2021-12-08 PROCEDURE — 99213 OFFICE O/P EST LOW 20 MIN: CPT | Performed by: FAMILY MEDICINE

## 2021-12-08 RX ORDER — LISINOPRIL 20 MG/1
20 TABLET ORAL EVERY 12 HOURS SCHEDULED
Qty: 180 TABLET | Refills: 3 | Status: SHIPPED | OUTPATIENT
Start: 2021-12-08 | End: 2022-06-10 | Stop reason: DRUGHIGH

## 2021-12-08 NOTE — PATIENT INSTRUCTIONS
1.  If weight comes down more and BP starts dropping below 110/70 regularly, call me and we will reduce your lisinopril more    2.  Have routine vision screening done

## 2021-12-08 NOTE — PROGRESS NOTES
Subjective   Valente Arndt Jr. is a 51 y.o. male.     Chief Complaint   Patient presents with   • Hypertension     6 month     The patient has consented to being recorded using [employer].    Hypertension  This is a recurrent problem. The current episode started more than 1 year ago. The problem has been rapidly improving since onset. The problem is controlled. Pertinent negatives include no anxiety, blurred vision, chest pain, headaches, malaise/fatigue, neck pain, orthopnea, palpitations, peripheral edema, PND, shortness of breath or sweats. Agents associated with hypertension include NSAIDs. Risk factors for coronary artery disease include family history and obesity. There are no compliance problems.         Valente Arndt Jr. presents today for   Chief Complaint   Patient presents with   • Hypertension     6 month     He presents today for follow-up on hypertension.    The patient reports his blood pressure has been great.     He denies any new concerns or changes since his last visit. He denies palpitations or chest pain.    He states that he has not been exercising as much due to the cold weather.      This patient is accompanied by their self who contributes to the history of their care.    The following portions of the patient's history were reviewed and updated as appropriate: allergies, current medications, past family history, past medical history, past social history, past surgical history and problem list.    Active Ambulatory Problems     Diagnosis Date Noted   • ICH (intracerebral hemorrhage) (Formerly [facility]) 08/04/2018   • Essential hypertension 08/05/2018   • Class 3 severe obesity due to excess calories with serious comorbidity and body mass index (BMI) of 45.0 to 49.9 in adult (Formerly [facility]) 08/05/2018   • Pure hypercholesterolemia 10/08/2018   • Morbid obesity (Formerly [facility]) 08/06/2019     Resolved Ambulatory Problems     Diagnosis Date Noted   • H/O noncompliance with medical treatment, presenting hazards to health  "08/05/2018   • Other constipation 08/08/2018   • Acute calculous cholecystitis 08/06/2019     Past Medical History:   Diagnosis Date   • Cholelithiasis 8/2019   • Hypertension    • Left Intracranial hemorrhage (CMS/HCC)    • Stroke (HCC)      Past Surgical History:   Procedure Laterality Date   • APPENDECTOMY      Open   • CHOLECYSTECTOMY N/A 8/7/2019    Procedure: CHOLECYSTECTOMY LAPAROSCOPIC;  Surgeon: Tawanna Hemphill MD;  Location: [facility];  Service: General   • FOOT SURGERY     • WISDOM TOOTH EXTRACTION       Family History   Problem Relation Age of Onset   • Obesity Mother    • Hypertension Father    • Kidney disease Father    • Heart murmur Father    • No Known Problems Daughter    • No Known Problems Daughter      Social History     Socioeconomic History   • Marital status: Single   Tobacco Use   • Smoking status: Never Smoker   • Smokeless tobacco: Never Used   Vaping Use   • Vaping Use: Never used   Substance and Sexual Activity   • Alcohol use: Not Currently     Alcohol/week: 0.0 standard drinks     Comment: rare occasion   • Drug use: No   • Sexual activity: Not Currently     Partners: Female       Review of Systems   Constitutional: Negative for malaise/fatigue.   Eyes: Negative for blurred vision.   Respiratory: Negative for shortness of breath.    Cardiovascular: Negative for chest pain, palpitations, orthopnea and PND.   Musculoskeletal: Negative for neck pain.   Neurological: Negative for headaches.     See HPI    Objective   Blood pressure 118/78, pulse 55, resp. rate 16, height 165.1 cm (65\"), weight 115 kg (253 lb 9.6 oz), SpO2 97 %.  Nursing note reviewed  Physical Exam  Const: NAD, A&Ox4, Pleasant, Cooperative  Eyes: EOMI, no conjunctivitis  ENT: No nasal discharge present, neck supple  Cardiac: Regular rate and rhythm, no cyanosis  Resp: Respiratory rate within normal limits, no increased work of breathing, no audible wheezing or retractions noted  GI: No distention or ascites  MSK: Motor and " sensation grossly intact in bilateral upper extremities  Neurologic: CN II-XII grossly intact  Psych: Appropriate mood and behavior.  Skin: Warm, dry  Procedures  Assessment/Plan   Problem List Items Addressed This Visit        Cardiac and Vasculature    Essential hypertension    Overview     With his significant weight loss and dedication to diet and activity, he has been able to reduce his lisinopril from 30mg BID to 20mg BID over the last year (11/2020).         Relevant Medications    lisinopril (PRINIVIL,ZESTRIL) 20 MG tablet       Endocrine and Metabolic    Morbid obesity (HCC)    Relevant Medications    lisinopril (PRINIVIL,ZESTRIL) 20 MG tablet       Neuro    ICH (intracerebral hemorrhage) (HCC)    Relevant Medications    lisinopril (PRINIVIL,ZESTRIL) 20 MG tablet      Other Visit Diagnoses     Need for influenza vaccination    -  Primary    Relevant Orders    FluLaval/Fluarix/Fluzone >6 Months (2193-1066) (Completed)          1. Hypertension  - Continue taking 20 mg of lisinopril twice daily. Refill sent.  - Once he starts exercising, and losing weight again, he will let me know if he notices a decrease in his blood pressure lower than 110/70, and I will make an adjustment in the dose of lisinopril.    2. Health maintenance  - An influenza vaccine was given today in the clinic.  - Labs were obtained in 07/2021, so he is not due for blood work.  - He will be scheduling a regular screening eye exam.    Return for follow-up in 6 months.        Patient Instructions   1.  If weight comes down more and BP starts dropping below 110/70 regularly, call me and we will reduce your lisinopril more    2.  Have routine vision screening done      Return in about 6 months (around 6/8/2022) for Annual, with A1c;.      [facility]     Ambulatory progress note signed and attested to by Raoul Anthony D.O.         Transcribed from ambient dictation for Raoul Anthony,  by Adela Joseph.  12/08/21   10:52 EST    Patient verbalized  consent to the visit recording.  I have personally performed the services described in this document as transcribed by the above individual, and it is both accurate and complete.  Raoul Anthony DO  12/16/2021  14:37 EST

## 2022-06-10 ENCOUNTER — OFFICE VISIT (OUTPATIENT)
Dept: FAMILY MEDICINE CLINIC | Facility: CLINIC | Age: 52
End: 2022-06-10

## 2022-06-10 ENCOUNTER — LAB (OUTPATIENT)
Dept: LAB | Facility: HOSPITAL | Age: 52
End: 2022-06-10

## 2022-06-10 VITALS
HEART RATE: 57 BPM | RESPIRATION RATE: 16 BRPM | WEIGHT: 241.6 LBS | DIASTOLIC BLOOD PRESSURE: 74 MMHG | HEIGHT: 65 IN | BODY MASS INDEX: 40.25 KG/M2 | OXYGEN SATURATION: 95 % | SYSTOLIC BLOOD PRESSURE: 112 MMHG

## 2022-06-10 DIAGNOSIS — E66.01 CLASS 3 SEVERE OBESITY DUE TO EXCESS CALORIES WITH SERIOUS COMORBIDITY AND BODY MASS INDEX (BMI) OF 40.0 TO 44.9 IN ADULT: ICD-10-CM

## 2022-06-10 DIAGNOSIS — Z00.00 WELL ADULT EXAM: ICD-10-CM

## 2022-06-10 DIAGNOSIS — E78.00 PURE HYPERCHOLESTEROLEMIA: ICD-10-CM

## 2022-06-10 DIAGNOSIS — Z13.0 SCREENING FOR DEFICIENCY ANEMIA: ICD-10-CM

## 2022-06-10 DIAGNOSIS — I10 ESSENTIAL HYPERTENSION: ICD-10-CM

## 2022-06-10 DIAGNOSIS — E66.01 MORBID OBESITY: ICD-10-CM

## 2022-06-10 DIAGNOSIS — Z23 IMMUNIZATION DUE: ICD-10-CM

## 2022-06-10 DIAGNOSIS — R73.01 FASTING HYPERGLYCEMIA: ICD-10-CM

## 2022-06-10 DIAGNOSIS — Z00.00 WELL ADULT EXAM: Primary | ICD-10-CM

## 2022-06-10 DIAGNOSIS — I61.0 NONTRAUMATIC SUBCORTICAL HEMORRHAGE OF LEFT CEREBRAL HEMISPHERE: ICD-10-CM

## 2022-06-10 LAB
ALBUMIN SERPL-MCNC: 4.3 G/DL (ref 3.5–5.2)
ALBUMIN/GLOB SERPL: 1.5 G/DL
ALP SERPL-CCNC: 85 U/L (ref 39–117)
ALT SERPL W P-5'-P-CCNC: 17 U/L (ref 1–41)
ANION GAP SERPL CALCULATED.3IONS-SCNC: 10.6 MMOL/L (ref 5–15)
AST SERPL-CCNC: 23 U/L (ref 1–40)
BILIRUB SERPL-MCNC: 0.4 MG/DL (ref 0–1.2)
BILIRUB UR QL STRIP: NEGATIVE
BUN SERPL-MCNC: 19 MG/DL (ref 6–20)
BUN/CREAT SERPL: 17.8 (ref 7–25)
CALCIUM SPEC-SCNC: 9.9 MG/DL (ref 8.6–10.5)
CHLORIDE SERPL-SCNC: 100 MMOL/L (ref 98–107)
CHOLEST SERPL-MCNC: 160 MG/DL (ref 0–200)
CLARITY UR: CLEAR
CO2 SERPL-SCNC: 26.4 MMOL/L (ref 22–29)
COLOR UR: YELLOW
CREAT SERPL-MCNC: 1.07 MG/DL (ref 0.76–1.27)
DEPRECATED RDW RBC AUTO: 41.1 FL (ref 37–54)
EGFRCR SERPLBLD CKD-EPI 2021: 83.5 ML/MIN/1.73
ERYTHROCYTE [DISTWIDTH] IN BLOOD BY AUTOMATED COUNT: 12.7 % (ref 12.3–15.4)
EXPIRATION DATE: NORMAL
GLOBULIN UR ELPH-MCNC: 2.9 GM/DL
GLUCOSE SERPL-MCNC: 104 MG/DL (ref 65–99)
GLUCOSE UR STRIP-MCNC: NEGATIVE MG/DL
HBA1C MFR BLD: 6.4 %
HCT VFR BLD AUTO: 41.7 % (ref 37.5–51)
HCV AB SER DONR QL: NORMAL
HDLC SERPL-MCNC: 55 MG/DL (ref 40–60)
HGB BLD-MCNC: 13.6 G/DL (ref 13–17.7)
HGB UR QL STRIP.AUTO: NEGATIVE
KETONES UR QL STRIP: NEGATIVE
LDLC SERPL CALC-MCNC: 96 MG/DL (ref 0–100)
LDLC/HDLC SERPL: 1.75 {RATIO}
LEUKOCYTE ESTERASE UR QL STRIP.AUTO: NEGATIVE
Lab: NORMAL
MCH RBC QN AUTO: 29.7 PG (ref 26.6–33)
MCHC RBC AUTO-ENTMCNC: 32.6 G/DL (ref 31.5–35.7)
MCV RBC AUTO: 91 FL (ref 79–97)
NITRITE UR QL STRIP: NEGATIVE
PH UR STRIP.AUTO: 6.5 [PH] (ref 5–8)
PLATELET # BLD AUTO: 273 10*3/MM3 (ref 140–450)
PMV BLD AUTO: 11.9 FL (ref 6–12)
POTASSIUM SERPL-SCNC: 4.6 MMOL/L (ref 3.5–5.2)
PROT SERPL-MCNC: 7.2 G/DL (ref 6–8.5)
PROT UR QL STRIP: NEGATIVE
RBC # BLD AUTO: 4.58 10*6/MM3 (ref 4.14–5.8)
SODIUM SERPL-SCNC: 137 MMOL/L (ref 136–145)
SP GR UR STRIP: 1.02 (ref 1–1.03)
TRIGL SERPL-MCNC: 44 MG/DL (ref 0–150)
TSH SERPL DL<=0.05 MIU/L-ACNC: 4.47 UIU/ML (ref 0.27–4.2)
UROBILINOGEN UR QL STRIP: NORMAL
VLDLC SERPL-MCNC: 9 MG/DL (ref 5–40)
WBC NRBC COR # BLD: 8.2 10*3/MM3 (ref 3.4–10.8)

## 2022-06-10 PROCEDURE — 86803 HEPATITIS C AB TEST: CPT

## 2022-06-10 PROCEDURE — 81003 URINALYSIS AUTO W/O SCOPE: CPT

## 2022-06-10 PROCEDURE — 90715 TDAP VACCINE 7 YRS/> IM: CPT | Performed by: FAMILY MEDICINE

## 2022-06-10 PROCEDURE — 90471 IMMUNIZATION ADMIN: CPT | Performed by: FAMILY MEDICINE

## 2022-06-10 PROCEDURE — 3044F HG A1C LEVEL LT 7.0%: CPT | Performed by: FAMILY MEDICINE

## 2022-06-10 PROCEDURE — 80061 LIPID PANEL: CPT

## 2022-06-10 PROCEDURE — 99396 PREV VISIT EST AGE 40-64: CPT | Performed by: FAMILY MEDICINE

## 2022-06-10 PROCEDURE — 80050 GENERAL HEALTH PANEL: CPT

## 2022-06-10 PROCEDURE — 83036 HEMOGLOBIN GLYCOSYLATED A1C: CPT | Performed by: FAMILY MEDICINE

## 2022-06-10 RX ORDER — LISINOPRIL 10 MG/1
10 TABLET ORAL EVERY 12 HOURS SCHEDULED
Qty: 180 TABLET | Refills: 3 | Status: SHIPPED | OUTPATIENT
Start: 2022-06-10

## 2022-06-10 NOTE — ASSESSMENT & PLAN NOTE
His blood pressure is doing fantastic, 112 mmHg systolic today. I would like him to back down to lisinopril 10 mg every 12 hours. He will monitor his blood pressure at home and call in about 6 weeks.

## 2022-06-10 NOTE — PROGRESS NOTES
Annual Well Adult Visit     Patient Name: Valente Arndt Jr.  : 1970   MRN: [id number]   Care Team: Patient Care Team:  Raoul Anthony DO as PCP - General (Family Medicine)    Chief Complaint:    Chief Complaint   Patient presents with   • Annual Exam   • Hypertension   • Hyperglycemia       History of Present Illness: Valente Arndt Jr. is a 52 y.o. male who presents today for annual physical exam and preventative care.    HPI    The patient reports that he has been doing well. He denies any changes. He denies any shortness of breath, chest pain, or vision changes. He notes that his exercise regimen is doing well. He denies any hearing trouble, abdominal pain, constipation, diarrhea, or sexual dysfunction. He admits  that his diet consist of mostly fish 2 to 3 times a week. He reports that he he eats very little red meat. He denies any swelling in his hands or feet. He reports that his feeling in his feet is good. He notes that he can not remember when his last tetanus shot was. The patient needs a refill on his lisinopril.    This patient is accompanied by their self who contributes to the history of their care.    The following portions of the patient's history were reviewed and updated as appropriate: allergies, current medications, past family history, past medical history, past social history, past surgical history and problem list.    Subjective      Review of Systems:   Review of Systems - See HPI    Past Medical History:   Past Medical History:   Diagnosis Date   • Cholelithiasis 2019    Gallbladder removed   • Hypertension    • Left Intracranial hemorrhage (CMS/HCC)     pt reports resolution to right-sided hemiparesis    • Stroke (HCC)        Past Surgical History:   Past Surgical History:   Procedure Laterality Date   • APPENDECTOMY      Open   • CHOLECYSTECTOMY N/A 2019    Procedure: CHOLECYSTECTOMY LAPAROSCOPIC;  Surgeon: Tawanna Hemphill MD;  Location: [facility];  Service:  "General   • FOOT SURGERY     • WISDOM TOOTH EXTRACTION         Family History:   Family History   Problem Relation Age of Onset   • Obesity Mother    • Hypertension Father    • Kidney disease Father    • Heart murmur Father    • No Known Problems Daughter    • No Known Problems Daughter        Social History:   Social History     Socioeconomic History   • Marital status: Single   Tobacco Use   • Smoking status: Never Smoker   • Smokeless tobacco: Never Used   Vaping Use   • Vaping Use: Never used   Substance and Sexual Activity   • Alcohol use: Not Currently     Comment: rare occasion   • Drug use: No   • Sexual activity: Not Currently     Partners: Female       Tobacco History:   Social History     Tobacco Use   Smoking Status Never Smoker   Smokeless Tobacco Never Used       Medications:     Current Outpatient Medications:   •  lisinopril (PRINIVIL,ZESTRIL) 10 MG tablet, Take 1 tablet by mouth Every 12 (Twelve) Hours., Disp: 180 tablet, Rfl: 3  •  naproxen sodium (ALEVE) 220 MG tablet,  See Instructions, 0 Refill(s), PRN, Disp: , Rfl:     Allergies:   No Known Allergies    Objective   Objective     Physical Exam:  Vital Signs:   Vitals:    06/10/22 0848   BP: 112/74   Pulse: 57   Resp: 16   SpO2: 95%   Weight: 110 kg (241 lb 9.6 oz)   Height: 165.1 cm (65\")     Body mass index is 40.2 kg/m².     Physical Exam  Nursing note reviewed  General: Patient is well-nourished, well-developed, and in no acute distress.  HEENT: Normocephalic with no contusions noted, no ptosis or palsy. Pupils equally round and reactive to light, extraocular movements intact. Patient holds steady gaze, can follow to midline. Hearing grossly normal without deficit, exterior auricles normal, tympanic membranes normal without erythema or bulging.  Lymphatic: Posterior auricular, cervical, submandibular, supraclavicular, axillary lymphatic sites palpated without abnormality  Cardiovascular: Normal study rate without ectopy. PMI palpated, normal. " Normal S1, S2. No murmurs rubs or gallops.  Respiratory: No tenderness to palpation on the chest wall, lungs clear to auscultation bilaterally, no wheezes, rales, or rhonchi. No pleural friction rubs.  Gastrointestinal: Bowel sounds present, normoactive globally. No bruit noted. Nontender, nondistended. Normal percussive exam, no hepatomegaly, no splenomegaly. No hernias, scars, gross lesions.  Extremities: No cyanosis or edema, 2+ pulses bilaterally, reflexes normal. Capillary refill time normal.  MSK: Normal gait and station. 5/5 strength globally.  Neuro: Cranial nerves II-XII grossly intact. Patient alert and oriented x3.  PHQ-9 Depression Screening  Little interest or pleasure in doing things?     Feeling down, depressed, or hopeless?     Trouble falling or staying asleep, or sleeping too much?     Feeling tired or having little energy?     Poor appetite or overeating?     Feeling bad about yourself - or that you are a failure or have let yourself or your family down?     Trouble concentrating on things, such as reading the newspaper or watching television?     Moving or speaking so slowly that other people could have noticed? Or the opposite - being so fidgety or restless that you have been moving around a lot more than usual?     Thoughts that you would be better off dead, or of hurting yourself in some way?     PHQ-9 Total Score     If you checked off any problems, how difficult have these problems made it for you to do your work, take care of things at home, or get along with other people?       Procedures/Radiology     Procedures  No radiology results for the last 7 days     Assessment & Plan   Assessment / Plan      Assessment/Plan:   Problems Addressed This Visit  Diagnoses and all orders for this visit:    1. Well adult exam (Primary)  Comments:  We will update labs today.  We will update his tetanus shot today.  Orders:  -     Comprehensive Metabolic Panel; Future  -     Urinalysis With Microscopic If  Indicated (No Culture) - Urine, Clean Catch; Future  -     TSH; Future  -     Hepatitis C Antibody; Future    2. Essential hypertension  Assessment & Plan:  His blood pressure is doing fantastic, 112 mmHg systolic today. I would like him to back down to lisinopril 10 mg every 12 hours. He will monitor his blood pressure at home and call in about 6 weeks.    Orders:  -     lisinopril (PRINIVIL,ZESTRIL) 10 MG tablet; Take 1 tablet by mouth Every 12 (Twelve) Hours.  Dispense: 180 tablet; Refill: 3    3. Pure hypercholesterolemia  -     Lipid Panel; Future    4. Class 3 severe obesity due to excess calories with serious comorbidity and body mass index (BMI) of 40.0 to 44.9 in adult (HCC)    5. Nontraumatic subcortical hemorrhage of left cerebral hemisphere (HCC)  -     lisinopril (PRINIVIL,ZESTRIL) 10 MG tablet; Take 1 tablet by mouth Every 12 (Twelve) Hours.  Dispense: 180 tablet; Refill: 3    6. Morbid obesity (HCC)  -     lisinopril (PRINIVIL,ZESTRIL) 10 MG tablet; Take 1 tablet by mouth Every 12 (Twelve) Hours.  Dispense: 180 tablet; Refill: 3    7. Fasting hyperglycemia  -     POC Glycosylated Hemoglobin (Hb A1C)    8. Screening for deficiency anemia  -     CBC (No Diff); Future    9. Immunization due  -     Tdap Vaccine Greater Than or Equal To 8yo IM    Problem List Items Addressed This Visit        Cardiac and Vasculature    Essential hypertension    Overview     With his significant weight loss and dedication to diet and activity, he has been able to reduce his lisinopril from 30mg BID to 20mg BID over the last year (11/2020).           Current Assessment & Plan     His blood pressure is doing fantastic, 112 mmHg systolic today. I would like him to back down to lisinopril 10 mg every 12 hours. He will monitor his blood pressure at home and call in about 6 weeks.           Relevant Medications    lisinopril (PRINIVIL,ZESTRIL) 10 MG tablet    Pure hypercholesterolemia    Relevant Orders    Lipid Panel (Completed)        Endocrine and Metabolic    Class 3 severe obesity due to excess calories with serious comorbidity and body mass index (BMI) of 40.0 to 44.9 in adult (HCC)    Morbid obesity (HCC)    Relevant Medications    lisinopril (PRINIVIL,ZESTRIL) 10 MG tablet       Neuro    ICH (intracerebral hemorrhage) (HCC)    Relevant Medications    lisinopril (PRINIVIL,ZESTRIL) 10 MG tablet      Other Visit Diagnoses     Well adult exam    -  Primary    We will update labs today.  We will update his tetanus shot today.    Relevant Orders    Comprehensive Metabolic Panel (Completed)    Urinalysis With Microscopic If Indicated (No Culture) - Urine, Clean Catch (Completed)    TSH (Completed)    Hepatitis C Antibody (Completed)    Fasting hyperglycemia        Relevant Orders    POC Glycosylated Hemoglobin (Hb A1C) (Completed)    Screening for deficiency anemia        Relevant Orders    CBC (No Diff) (Completed)    Immunization due        Relevant Orders    Tdap Vaccine Greater Than or Equal To 8yo IM (Completed)              See patient diagnoses and orders along with patient instructions for assessment, plan, and changes to care for patient.    The preventative exam has been reviewed in detail.  The patient has been fully counseled on preventative guidelines for vaccines, cancer screenings, and other health maintenance needs. The patient was counseled on maintaining a lifestyle to promote good health and to minimize chronic diseases.  The patient has been assisted with scheduling healthcare procedures for the coming year and given a written document outlining these recommendations. Age-appropriate screening measures have been ordered for the patient today as indicated above.    There are no Patient Instructions on file for this visit.    Follow Up:   Return in about 1 year (around 6/10/2023) for Annual.        DO MARIS Lubin RD  [facility]  [address]  [place]  [facility] [address]  Fax [phone]  Phone [phone]         Transcribed from ambient dictation for Raoul Anthony, DO by Karen Garcia.  06/10/22   10:14 EDT    Patient verbalized consent to the visit recording.

## 2023-04-03 ENCOUNTER — HOSPITAL ENCOUNTER (EMERGENCY)
Facility: HOSPITAL | Age: 53
Discharge: HOME | End: 2023-04-03
Attending: EMERGENCY MEDICINE | Admitting: EMERGENCY MEDICINE
Payer: COMMERCIAL

## 2023-04-03 VITALS
DIASTOLIC BLOOD PRESSURE: 84 MMHG | SYSTOLIC BLOOD PRESSURE: 138 MMHG | TEMPERATURE: 98.7 F | HEIGHT: 66 IN | WEIGHT: 225 LBS | HEART RATE: 67 BPM | RESPIRATION RATE: 16 BRPM | BODY MASS INDEX: 36.16 KG/M2 | OXYGEN SATURATION: 98 %

## 2023-04-03 DIAGNOSIS — S30.21XA CONTUSION OF PENIS, INITIAL ENCOUNTER: Primary | ICD-10-CM

## 2023-04-03 PROCEDURE — 99282 EMERGENCY DEPT VISIT SF MDM: CPT

## 2023-04-03 NOTE — ED PROVIDER NOTES
"Subjective   History of Present Illness  53-year-old male presents for evaluation of \"penis problem.\"  He states that 1 week ago he was masturbating when he feels that he may have potentially \"fractured his penis.\"  He states that he had pain, discoloration, and swelling to his penis that has persisted since that time.  He notes that he has been able to void without difficulty.  He notes that he has had multiple erections over the past week without any issues.  He was concerned about the discoloration to the glans of his penis and came to the ED to be evaluated today.  He denies any abdominal pain.  No fevers.  He otherwise feels well at this time.        Review of Systems   Genitourinary: Positive for penile swelling.   All other systems reviewed and are negative.      Past Medical History:   Diagnosis Date   • Cholelithiasis 8/2019    Gallbladder removed   • Hypertension    • Left Intracranial hemorrhage (CMS/HCC)     pt reports resolution to right-sided hemiparesis    • Stroke        No Known Allergies    Past Surgical History:   Procedure Laterality Date   • APPENDECTOMY      Open   • CHOLECYSTECTOMY N/A 8/7/2019    Procedure: CHOLECYSTECTOMY LAPAROSCOPIC;  Surgeon: Tawanna Hemphill MD;  [facility];  Service: General   • FOOT SURGERY     • WISDOM TOOTH EXTRACTION         Family History   Problem Relation Age of Onset   • Obesity Mother    • Hypertension Father    • Kidney disease Father    • Heart murmur Father    • No Known Problems Daughter    • No Known Problems Daughter        Social History     Socioeconomic History   • Marital status: Single   Tobacco Use   • Smoking status: Never   • Smokeless tobacco: Never   Vaping Use   • Vaping Use: Never used   Substance and Sexual Activity   • Alcohol use: Not Currently     Comment: rare occasion   • Drug use: No   • Sexual activity: Not Currently     Partners: Female           Objective   Physical Exam  Vitals and nursing note reviewed.   Constitutional:  " "     General: He is not in acute distress.     Appearance: He is well-developed. He is not diaphoretic.      Comments: Well-appearing male in no acute distress   HENT:      Head: Normocephalic and atraumatic.   Neck:      Vascular: No JVD.   Cardiovascular:      Rate and Rhythm: Normal rate and regular rhythm.      Heart sounds: Normal heart sounds. No murmur heard.    No friction rub. No gallop.   Pulmonary:      Effort: Pulmonary effort is normal. No respiratory distress.      Breath sounds: Normal breath sounds. No wheezing or rales.   Abdominal:      General: Bowel sounds are normal. There is no distension.      Palpations: Abdomen is soft. There is no mass.      Tenderness: There is no abdominal tenderness. There is no guarding.      Comments: No focal abdominal tenderness, no peritoneal signs, no pain out of proportion to exam   Genitourinary:     Testes: Normal.   Musculoskeletal:         General: Normal range of motion.   Skin:     Comments: Minimal bruising noted to base of glans, penis is otherwise unremarkable in appearance, no scrotal swelling, no scrotal tenderness, no purulence, no erythema noted   Neurological:      Mental Status: He is alert and oriented to person, place, and time.      Comments: Normal gait   Psychiatric:         Mood and Affect: Mood normal.         Thought Content: Thought content normal.         Judgment: Judgment normal.         Procedures           ED Course  ED Course as of 04/03/23 1001   Mon Apr 03, 2023   1000 53-year-old male presents for evaluation of \"penis problem.\"  He states that a week ago he was masturbating and is concerned that he may have \"fractured his penis\" while doing so.  He notes that he has had swelling and discoloration to his glans since that time.  On arrival to the ED, the patient is well-appearing.  On exam, he has very minimal bruising noted to the base of his glans.  His penis is unremarkable in appearance otherwise.  There is no scrotal swelling.  " "There is no other discoloration.  He notes that he has had multiple erections over the past week without any issues and has been voiding without any issues as well.  No indication for emergent urology consult.  Patient reassured and counseled regarding symptomatic management of contusion.  I have referred him to Dr. Fay of urology for outpatient follow-up if he fails conservative measures in the coming days.  Agreeable with plan and given appropriate strict return precautions. [DD]      ED Course User Index  [DD] Lenard Urban MD                                          No results found for this or any previous visit (from the past 24 hour(s)).  Note: In addition to lab results from this visit, the labs listed above may include labs taken at another facility or during a different encounter within the last 24 hours. Please correlate lab times with ED admission and discharge times for further clarification of the services performed during this visit.    No orders to display     Vitals:    04/03/23 0937   BP: 138/84   BP Location: Left arm   Patient Position: Sitting   Pulse: 67   Resp: 16   Temp: 98.7 °F (37.1 °C)   TempSrc: Oral   SpO2: 98%   Weight: 102 kg (225 lb)   Height: 167.6 cm (66\")     Medications - No data to display  ECG/EMG Results (last 24 hours)     ** No results found for the last 24 hours. **        No orders to display           MDM    Final diagnoses:   Contusion of penis, initial encounter       ED Disposition  ED Disposition     ED Disposition   Discharge    Condition   Stable    Comment   --             Jamie Fay MD  [address] [place] [address]  [phone]    In 1 week           Medication List      No changes were made to your prescriptions during this visit.          Lenard Urban MD  04/03/23 1003    "

## 2023-06-14 ENCOUNTER — OFFICE VISIT (OUTPATIENT)
Dept: FAMILY MEDICINE CLINIC | Facility: CLINIC | Age: 53
End: 2023-06-14
Payer: COMMERCIAL

## 2023-06-14 ENCOUNTER — LAB (OUTPATIENT)
Dept: LAB | Facility: HOSPITAL | Age: 53
End: 2023-06-14
Payer: COMMERCIAL

## 2023-06-14 VITALS
BODY MASS INDEX: 36.61 KG/M2 | OXYGEN SATURATION: 98 % | HEART RATE: 64 BPM | DIASTOLIC BLOOD PRESSURE: 80 MMHG | SYSTOLIC BLOOD PRESSURE: 126 MMHG | WEIGHT: 227.8 LBS | HEIGHT: 66 IN | TEMPERATURE: 96.5 F

## 2023-06-14 DIAGNOSIS — Z12.5 SCREENING PSA (PROSTATE SPECIFIC ANTIGEN): ICD-10-CM

## 2023-06-14 DIAGNOSIS — E78.00 PURE HYPERCHOLESTEROLEMIA: ICD-10-CM

## 2023-06-14 DIAGNOSIS — Z00.00 WELL ADULT EXAM: Primary | ICD-10-CM

## 2023-06-14 DIAGNOSIS — I10 ESSENTIAL HYPERTENSION: ICD-10-CM

## 2023-06-14 DIAGNOSIS — E66.01 CLASS 2 SEVERE OBESITY DUE TO EXCESS CALORIES WITH SERIOUS COMORBIDITY AND BODY MASS INDEX (BMI) OF 36.0 TO 36.9 IN ADULT: ICD-10-CM

## 2023-06-14 DIAGNOSIS — I61.0 NONTRAUMATIC SUBCORTICAL HEMORRHAGE OF LEFT CEREBRAL HEMISPHERE: ICD-10-CM

## 2023-06-14 DIAGNOSIS — Z00.00 WELL ADULT EXAM: ICD-10-CM

## 2023-06-14 PROBLEM — E66.812 CLASS 2 SEVERE OBESITY DUE TO EXCESS CALORIES WITH SERIOUS COMORBIDITY AND BODY MASS INDEX (BMI) OF 36.0 TO 36.9 IN ADULT: Status: ACTIVE | Noted: 2018-08-05

## 2023-06-14 LAB
ALBUMIN SERPL-MCNC: 4.3 G/DL (ref 3.5–5.2)
ALBUMIN/GLOB SERPL: 1.6 G/DL
ALP SERPL-CCNC: 92 U/L (ref 39–117)
ALT SERPL W P-5'-P-CCNC: 20 U/L (ref 1–41)
ANION GAP SERPL CALCULATED.3IONS-SCNC: 10 MMOL/L (ref 5–15)
AST SERPL-CCNC: 18 U/L (ref 1–40)
BILIRUB SERPL-MCNC: 0.4 MG/DL (ref 0–1.2)
BILIRUB UR QL STRIP: NEGATIVE
BUN SERPL-MCNC: 15 MG/DL (ref 6–20)
BUN/CREAT SERPL: 14.2 (ref 7–25)
CALCIUM SPEC-SCNC: 10.2 MG/DL (ref 8.6–10.5)
CHLORIDE SERPL-SCNC: 104 MMOL/L (ref 98–107)
CHOLEST SERPL-MCNC: 163 MG/DL (ref 0–200)
CLARITY UR: CLEAR
CO2 SERPL-SCNC: 27 MMOL/L (ref 22–29)
COLOR UR: YELLOW
CREAT SERPL-MCNC: 1.06 MG/DL (ref 0.76–1.27)
DEPRECATED RDW RBC AUTO: 41.3 FL (ref 37–54)
EGFRCR SERPLBLD CKD-EPI 2021: 83.9 ML/MIN/1.73
ERYTHROCYTE [DISTWIDTH] IN BLOOD BY AUTOMATED COUNT: 12.6 % (ref 12.3–15.4)
GLOBULIN UR ELPH-MCNC: 2.7 GM/DL
GLUCOSE SERPL-MCNC: 96 MG/DL (ref 65–99)
GLUCOSE UR STRIP-MCNC: NEGATIVE MG/DL
HBA1C MFR BLD: 5.4 % (ref 4.8–5.6)
HCT VFR BLD AUTO: 41.6 % (ref 37.5–51)
HDLC SERPL-MCNC: 55 MG/DL (ref 40–60)
HGB BLD-MCNC: 13.8 G/DL (ref 13–17.7)
HGB UR QL STRIP.AUTO: NEGATIVE
KETONES UR QL STRIP: NEGATIVE
LDLC SERPL CALC-MCNC: 99 MG/DL (ref 0–100)
LDLC/HDLC SERPL: 1.81 {RATIO}
LEUKOCYTE ESTERASE UR QL STRIP.AUTO: NEGATIVE
MCH RBC QN AUTO: 30.1 PG (ref 26.6–33)
MCHC RBC AUTO-ENTMCNC: 33.2 G/DL (ref 31.5–35.7)
MCV RBC AUTO: 90.8 FL (ref 79–97)
NITRITE UR QL STRIP: NEGATIVE
PH UR STRIP.AUTO: 6 [PH] (ref 5–8)
PLATELET # BLD AUTO: 237 10*3/MM3 (ref 140–450)
PMV BLD AUTO: 11.9 FL (ref 6–12)
POTASSIUM SERPL-SCNC: 4.5 MMOL/L (ref 3.5–5.2)
PROT SERPL-MCNC: 7 G/DL (ref 6–8.5)
PROT UR QL STRIP: NEGATIVE
PSA SERPL-MCNC: 0.29 NG/ML (ref 0–4)
RBC # BLD AUTO: 4.58 10*6/MM3 (ref 4.14–5.8)
SODIUM SERPL-SCNC: 141 MMOL/L (ref 136–145)
SP GR UR STRIP: 1.01 (ref 1–1.03)
TRIGL SERPL-MCNC: 42 MG/DL (ref 0–150)
TSH SERPL DL<=0.05 MIU/L-ACNC: 3.53 UIU/ML (ref 0.27–4.2)
UROBILINOGEN UR QL STRIP: NORMAL
VLDLC SERPL-MCNC: 9 MG/DL (ref 5–40)
WBC NRBC COR # BLD: 8.7 10*3/MM3 (ref 3.4–10.8)

## 2023-06-14 PROCEDURE — G0103 PSA SCREENING: HCPCS

## 2023-06-14 PROCEDURE — 83036 HEMOGLOBIN GLYCOSYLATED A1C: CPT

## 2023-06-14 PROCEDURE — 81003 URINALYSIS AUTO W/O SCOPE: CPT

## 2023-06-14 PROCEDURE — 80061 LIPID PANEL: CPT

## 2023-06-14 PROCEDURE — 80050 GENERAL HEALTH PANEL: CPT

## 2023-06-14 RX ORDER — LISINOPRIL 10 MG/1
10 TABLET ORAL EVERY 12 HOURS SCHEDULED
Qty: 180 TABLET | Refills: 3 | Status: SHIPPED | OUTPATIENT
Start: 2023-06-14

## 2023-06-14 NOTE — PROGRESS NOTES
Annual Well Adult Visit     Patient Name: Valente Arndt Jr.  : 1970   MRN: [id number]   Care Team: Patient Care Team:  Raoul Anthony DO as PCP - General (Family Medicine)    Chief Complaint:    Chief Complaint   Patient presents with    Annual Exam       History of Present Illness: Valente Arndt Jr. is a 53 y.o. male who presents today for annual physical exam and preventative care.    HPI    He has been doing well.  He is down about 15 more pounds from his annual exam last year.  Blood pressure has been well controlled at home.  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    This patient is accompanied by their self who contributes to the history of their care.    The following portions of the patient's history were reviewed and updated as appropriate: allergies, current medications, past family history, past medical history, past social history, past surgical history and problem list.       Allied Screenings    N/A         Date      Eye Exam       []              [x]   Up to date    Location:   []   Recommended       Counseled every 2 years in those without known issues, yearly if wearing glasses or contacts      Dental Exam       []           [x]   Up to date   Location:   []   Recommended       Counseled, recommended cleanings every 6 months, daily brushing and flossing        Skin Cancer Screening        []            []   Up to date   Location:   [x]   Recommended Counseled on regular sunscreen wear, self-skin checks      Obesity Counseling        []        [x]   Complete   []   Nutritionist referral   []   Declined Counseled on moderate portions, low meat diet focusing on whole foods and plant-based protein           Additional Testing         Date      Colorectal Screening        []   N/A   [x]   Complete        Date:     Where:         PSA  (Over age 50)     []   N/A   [x]    ordered today    Date:     Where:     Subjective      Review of Systems:   Review of Systems - See HPI    Past Medical History:   Past Medical History:   Diagnosis Date    Cholelithiasis 8/2019    Gallbladder removed    Hypertension     Left Intracranial hemorrhage (CMS/HCC)     pt reports resolution to right-sided hemiparesis     Stroke        Past Surgical History:   Past Surgical History:   Procedure Laterality Date    APPENDECTOMY      Open    CHOLECYSTECTOMY N/A 8/7/2019    Procedure: CHOLECYSTECTOMY LAPAROSCOPIC;  Surgeon: Tawanna Hemphill MD;  [facility];  Service: General    FOOT SURGERY      WISDOM TOOTH EXTRACTION         Family History:   Family History   Problem Relation Age of Onset    Obesity Mother     Hypertension Father     Kidney disease Father     Heart murmur Father     No Known Problems Daughter     No Known Problems Daughter        Social History:   Social History     Socioeconomic History    Marital status: Single   Tobacco Use    Smoking status: Never    Smokeless tobacco: Never   Vaping Use    Vaping Use: Never used   Substance and Sexual Activity    Alcohol use: Not Currently     Comment: rare occasion    Drug use: No    Sexual activity: Not Currently     Partners: Female       Tobacco History:   Social History     Tobacco Use   Smoking Status Never   Smokeless Tobacco Never       Medications:     Current Outpatient Medications:     lisinopril (PRINIVIL,ZESTRIL) 10 MG tablet, Take 1 tablet by mouth Every 12 (Twelve) Hours., Disp: 180 tablet, Rfl: 3    naproxen sodium (ALEVE) 220 MG tablet,  See Instructions, 0 Refill(s), PRN, Disp: , Rfl:     Immunizations:   Immunization History   Administered Date(s) Administered    COVID-19 ([facility]) 04/11/2021    Flu Vaccine Quad PF >36MO 10/25/2019    FluLaval/Fluzone >6mos 10/25/2019, 12/08/2021    Tdap 06/10/2022        Allergies:   No Known Allergies    Objective   Objective     Physical Exam:  Vital Signs:   Vitals:    06/14/23 0807   BP:  "126/80   BP Location: Left arm   Patient Position: Sitting   Cuff Size: Adult   Pulse: 64   Temp: 96.5 °F (35.8 °C)   TempSrc: Infrared   SpO2: 98%   Weight: 103 kg (227 lb 12.8 oz)   Height: 167.6 cm (66\")     Body mass index is 36.77 kg/m².     Physical Exam  Vitals and nursing note reviewed.   Constitutional:       General: He is not in acute distress.     Appearance: He is well-developed.   HENT:      Head: Normocephalic and atraumatic.      Right Ear: External ear normal.      Left Ear: External ear normal.   Eyes:      Conjunctiva/sclera: Conjunctivae normal.      Pupils: Pupils are equal, round, and reactive to light.   Neck:      Thyroid: No thyromegaly.      Vascular: No JVD.      Trachea: No tracheal deviation.   Cardiovascular:      Rate and Rhythm: Normal rate and regular rhythm.      Heart sounds: Normal heart sounds. No murmur heard.  Pulmonary:      Effort: Pulmonary effort is normal.      Breath sounds: Normal breath sounds.   Abdominal:      General: Bowel sounds are normal. There is no distension.      Palpations: Abdomen is soft. There is no mass.      Tenderness: There is no abdominal tenderness. There is no guarding or rebound.   Musculoskeletal:      Cervical back: Normal range of motion and neck supple.      Comments: Left shoulder: Positive Valverde impingement sign, negative empty can testing, rotator cuff strength 5/5   Lymphadenopathy:      Cervical: No cervical adenopathy.   Skin:     General: Skin is warm and dry.      Capillary Refill: Capillary refill takes less than 2 seconds.   Neurological:      Mental Status: He is alert and oriented to person, place, and time.      Cranial Nerves: No cranial nerve deficit.   Psychiatric:         Behavior: Behavior normal.     Nursing note reviewed  PHQ-2 Depression Screening  Little interest or pleasure in doing things? 0-->not at all   Feeling down, depressed, or hopeless? 0-->not at all   PHQ-2 Total Score 0     Procedures/Radiology "     Procedures  No radiology results for the last 7 days     Assessment & Plan   Assessment / Plan      Assessment/Plan:   Problems Addressed This Visit  Diagnoses and all orders for this visit:    1. Well adult exam (Primary)  -     CBC (No Diff); Future  -     Lipid Panel; Future  -     Hemoglobin A1c; Future  -     Comprehensive Metabolic Panel; Future  -     Urinalysis With Microscopic If Indicated (No Culture) - Urine, Clean Catch; Future  -     TSH; Future    2. Essential hypertension  Assessment & Plan:  Well-controlled today, refill provided for lisinopril 10 mg every 12 hours    Orders:  -     lisinopril (PRINIVIL,ZESTRIL) 10 MG tablet; Take 1 tablet by mouth Every 12 (Twelve) Hours.  Dispense: 180 tablet; Refill: 3    3. Pure hypercholesterolemia    4. Class 2 severe obesity due to excess calories with serious comorbidity and body mass index (BMI) of 36.0 to 36.9 in adult  Assessment & Plan:  Applauded for his efforts over the last few years, he declines medication assistance at this time, I did review possible use of Saxenda and Wegovy if desired.  Declines nutritionist referral at this time.      5. Screening PSA (prostate specific antigen)  -     PSA Screen; Future    6. Nontraumatic subcortical hemorrhage of left cerebral hemisphere  -     lisinopril (PRINIVIL,ZESTRIL) 10 MG tablet; Take 1 tablet by mouth Every 12 (Twelve) Hours.  Dispense: 180 tablet; Refill: 3      Problem List Items Addressed This Visit          Cardiac and Vasculature    Essential hypertension    Overview     With his significant weight loss and dedication to diet and activity, he has been able to reduce his lisinopril from 30mg BID to 20mg BID over the last years and even to 10mg BID in 2022.         Current Assessment & Plan     Well-controlled today, refill provided for lisinopril 10 mg every 12 hours         Relevant Medications    lisinopril (PRINIVIL,ZESTRIL) 10 MG tablet    Pure hypercholesterolemia    Overview     No  medication            Endocrine and Metabolic    Class 2 severe obesity due to excess calories with serious comorbidity and body mass index (BMI) of 36.0 to 36.9 in adult    Current Assessment & Plan     Applauded for his efforts over the last few years, he declines medication assistance at this time, I did review possible use of Saxenda and Wegovy if desired.  Declines nutritionist referral at this time.            Neuro    ICH (intracerebral hemorrhage)    Relevant Medications    lisinopril (PRINIVIL,ZESTRIL) 10 MG tablet     Other Visit Diagnoses       Well adult exam    -  Primary    Relevant Orders    CBC (No Diff)    Lipid Panel    Hemoglobin A1c    Comprehensive Metabolic Panel    Urinalysis With Microscopic If Indicated (No Culture) - Urine, Clean Catch    TSH    Screening PSA (prostate specific antigen)        Relevant Orders    PSA Screen            See patient diagnoses and orders along with patient instructions for assessment, plan, and changes to care for patient.    The preventative exam has been reviewed in detail.  The patient has been fully counseled on preventative guidelines for vaccines, cancer screenings, and other health maintenance needs. The patient was counseled on maintaining a lifestyle to promote good health and to minimize chronic diseases.  The patient has been assisted with scheduling healthcare procedures for the coming year and given a written document outlining these recommendations. Age-appropriate screening measures have been ordered for the patient today as indicated above.    There are no Patient Instructions on file for this visit.    Follow Up:   Return in about 1 year (around 6/14/2024) for Annual.    DO MARIS Issa RD  [facility]  [address]  [facility] [phone]  Fax [phone]  Phone [phone]

## 2023-06-14 NOTE — ASSESSMENT & PLAN NOTE
Applauded for his efforts over the last few years, he declines medication assistance at this time, I did review possible use of Saxenda and Wegovy if desired.  Declines nutritionist referral at this time.

## 2024-06-19 ENCOUNTER — LAB (OUTPATIENT)
Dept: LAB | Facility: HOSPITAL | Age: 54
End: 2024-06-19
Payer: COMMERCIAL

## 2024-06-19 ENCOUNTER — OFFICE VISIT (OUTPATIENT)
Dept: FAMILY MEDICINE CLINIC | Facility: CLINIC | Age: 54
End: 2024-06-19
Payer: COMMERCIAL

## 2024-06-19 VITALS
BODY MASS INDEX: 38.41 KG/M2 | HEART RATE: 70 BPM | WEIGHT: 239 LBS | DIASTOLIC BLOOD PRESSURE: 80 MMHG | SYSTOLIC BLOOD PRESSURE: 120 MMHG | HEIGHT: 66 IN

## 2024-06-19 DIAGNOSIS — Z12.5 SCREENING PSA (PROSTATE SPECIFIC ANTIGEN): ICD-10-CM

## 2024-06-19 DIAGNOSIS — Z00.00 PREVENTATIVE HEALTH CARE: Primary | ICD-10-CM

## 2024-06-19 DIAGNOSIS — I10 ESSENTIAL HYPERTENSION: ICD-10-CM

## 2024-06-19 DIAGNOSIS — Z13.220 SCREENING FOR HYPERLIPIDEMIA: ICD-10-CM

## 2024-06-19 DIAGNOSIS — E78.00 PURE HYPERCHOLESTEROLEMIA: ICD-10-CM

## 2024-06-19 DIAGNOSIS — Z13.89 SCREENING FOR BLOOD OR PROTEIN IN URINE: ICD-10-CM

## 2024-06-19 DIAGNOSIS — E66.01 CLASS 2 SEVERE OBESITY DUE TO EXCESS CALORIES WITH SERIOUS COMORBIDITY AND BODY MASS INDEX (BMI) OF 38.0 TO 38.9 IN ADULT: ICD-10-CM

## 2024-06-19 DIAGNOSIS — Z13.29 SCREENING FOR ENDOCRINE DISORDER: ICD-10-CM

## 2024-06-19 DIAGNOSIS — Z13.0 SCREENING FOR DEFICIENCY ANEMIA: ICD-10-CM

## 2024-06-19 LAB
ALBUMIN SERPL-MCNC: 4.3 G/DL (ref 3.5–5.2)
ALBUMIN/GLOB SERPL: 1.3 G/DL
ALP SERPL-CCNC: 79 U/L (ref 39–117)
ALT SERPL W P-5'-P-CCNC: 16 U/L (ref 1–41)
ANION GAP SERPL CALCULATED.3IONS-SCNC: 10 MMOL/L (ref 5–15)
AST SERPL-CCNC: 18 U/L (ref 1–40)
BASOPHILS # BLD AUTO: 0.08 10*3/MM3 (ref 0–0.2)
BASOPHILS NFR BLD AUTO: 1.2 % (ref 0–1.5)
BILIRUB SERPL-MCNC: 0.5 MG/DL (ref 0–1.2)
BILIRUB UR QL STRIP: NEGATIVE
BUN SERPL-MCNC: 19 MG/DL (ref 6–20)
BUN/CREAT SERPL: 19.2 (ref 7–25)
CALCIUM SPEC-SCNC: 10 MG/DL (ref 8.6–10.5)
CHLORIDE SERPL-SCNC: 102 MMOL/L (ref 98–107)
CHOLEST SERPL-MCNC: 187 MG/DL (ref 0–200)
CLARITY UR: CLEAR
CO2 SERPL-SCNC: 27 MMOL/L (ref 22–29)
COLOR UR: YELLOW
CREAT SERPL-MCNC: 0.99 MG/DL (ref 0.76–1.27)
DEPRECATED RDW RBC AUTO: 44.1 FL (ref 37–54)
EGFRCR SERPLBLD CKD-EPI 2021: 90.5 ML/MIN/1.73
EOSINOPHIL # BLD AUTO: 0.1 10*3/MM3 (ref 0–0.4)
EOSINOPHIL NFR BLD AUTO: 1.5 % (ref 0.3–6.2)
ERYTHROCYTE [DISTWIDTH] IN BLOOD BY AUTOMATED COUNT: 13 % (ref 12.3–15.4)
GLOBULIN UR ELPH-MCNC: 3.2 GM/DL
GLUCOSE SERPL-MCNC: 100 MG/DL (ref 65–99)
GLUCOSE UR STRIP-MCNC: NEGATIVE MG/DL
HBA1C MFR BLD: 5.7 % (ref 4.8–5.6)
HCT VFR BLD AUTO: 44.1 % (ref 37.5–51)
HDLC SERPL-MCNC: 58 MG/DL (ref 40–60)
HGB BLD-MCNC: 14.5 G/DL (ref 13–17.7)
HGB UR QL STRIP.AUTO: NEGATIVE
IMM GRANULOCYTES # BLD AUTO: 0.01 10*3/MM3 (ref 0–0.05)
IMM GRANULOCYTES NFR BLD AUTO: 0.2 % (ref 0–0.5)
KETONES UR QL STRIP: NEGATIVE
LDLC SERPL CALC-MCNC: 116 MG/DL (ref 0–100)
LDLC/HDLC SERPL: 1.98 {RATIO}
LEUKOCYTE ESTERASE UR QL STRIP.AUTO: NEGATIVE
LYMPHOCYTES # BLD AUTO: 1.39 10*3/MM3 (ref 0.7–3.1)
LYMPHOCYTES NFR BLD AUTO: 21.1 % (ref 19.6–45.3)
MCH RBC QN AUTO: 30.5 PG (ref 26.6–33)
MCHC RBC AUTO-ENTMCNC: 32.9 G/DL (ref 31.5–35.7)
MCV RBC AUTO: 92.8 FL (ref 79–97)
MONOCYTES # BLD AUTO: 0.56 10*3/MM3 (ref 0.1–0.9)
MONOCYTES NFR BLD AUTO: 8.5 % (ref 5–12)
NEUTROPHILS NFR BLD AUTO: 4.44 10*3/MM3 (ref 1.7–7)
NEUTROPHILS NFR BLD AUTO: 67.5 % (ref 42.7–76)
NITRITE UR QL STRIP: NEGATIVE
NRBC BLD AUTO-RTO: 0 /100 WBC (ref 0–0.2)
PH UR STRIP.AUTO: 6 [PH] (ref 5–8)
PLATELET # BLD AUTO: 252 10*3/MM3 (ref 140–450)
PMV BLD AUTO: 11.2 FL (ref 6–12)
POTASSIUM SERPL-SCNC: 4.6 MMOL/L (ref 3.5–5.2)
PROT SERPL-MCNC: 7.5 G/DL (ref 6–8.5)
PROT UR QL STRIP: NEGATIVE
PSA SERPL-MCNC: 0.27 NG/ML (ref 0–4)
RBC # BLD AUTO: 4.75 10*6/MM3 (ref 4.14–5.8)
SODIUM SERPL-SCNC: 139 MMOL/L (ref 136–145)
SP GR UR STRIP: 1.02 (ref 1–1.03)
T4 FREE SERPL-MCNC: 1.04 NG/DL (ref 0.92–1.68)
TRIGL SERPL-MCNC: 72 MG/DL (ref 0–150)
TSH SERPL DL<=0.05 MIU/L-ACNC: 5.23 UIU/ML (ref 0.27–4.2)
UROBILINOGEN UR QL STRIP: NORMAL
VLDLC SERPL-MCNC: 13 MG/DL (ref 5–40)
WBC NRBC COR # BLD AUTO: 6.58 10*3/MM3 (ref 3.4–10.8)

## 2024-06-19 PROCEDURE — 80050 GENERAL HEALTH PANEL: CPT

## 2024-06-19 PROCEDURE — 81003 URINALYSIS AUTO W/O SCOPE: CPT

## 2024-06-19 PROCEDURE — 83036 HEMOGLOBIN GLYCOSYLATED A1C: CPT

## 2024-06-19 PROCEDURE — 84439 ASSAY OF FREE THYROXINE: CPT

## 2024-06-19 PROCEDURE — 99396 PREV VISIT EST AGE 40-64: CPT | Performed by: FAMILY MEDICINE

## 2024-06-19 PROCEDURE — 80061 LIPID PANEL: CPT

## 2024-06-19 PROCEDURE — G0103 PSA SCREENING: HCPCS

## 2024-06-19 NOTE — PROGRESS NOTES
Annual Well Adult Visit     Patient Name: Valente Arndt Jr.  : 1970   MRN: [id number]   Care Team: Patient Care Team:  Raoul Anthony DO as PCP - General (Family Medicine)    Chief Complaint:    Chief Complaint   Patient presents with    Annual Exam       HPI    History of Present Illness: Valente Arndt Jr. is a 54 y.o. male who presents today for annual physical exam and preventative care.    Recent Hospitalizations:  He was not admitted to the hospital during the last year.     The following portions of the patient's history were reviewed and updated as appropriate: allergies, current medications, past family history, past medical history, past social history, past surgical history and problem list.       Allied Screenings    N/A         Date      Eye Exam   Uses Glasses/Contacts and Eye Exam Up To Date    []              [x]   Up to date    Location:   []   Recommended       Counseled every 2 years in those without known issues, yearly if wearing glasses or contacts      Dental Exam   Dentition: good    []           [x]   Up to date   Location:   []   Recommended       Counseled, recommended cleanings every 6 months, daily brushing and flossing        Skin Cancer Screening        []            []   Up to date   Location:   [x]   Recommended Counseled on regular sunscreen wear, self-skin checks      Obesity Counseling  Body mass index is 38.59 kg/m².       []        [x]   Complete   []   Nutritionist referral   []   Declined Counseled on moderate portions, low meat diet focusing on whole foods and plant-based protein          Additional Testing         Date      Colorectal Screening   Risk: average     []   N/A   []   Ordered today   [x]   Complete        Date:     Where:         PSA  (Over age 50)     []   N/A   []   Ordered today   [x]   Complete      Lab Results   Component Value Date    PSA 0.287 2023           US Aorta  (For male with >20 cigarette history, age 65)     [x]   N/A   []    Ordered today   []   Complete    Date:     Where:      CT for Smoker  (Age 55-75, 30 pk yr)     [x]   N/A   []   Ordered today   []   Complete    Date:     Where:      Hep. C     []   Ordered today   [x]   Complete      Hepatitis C Ab   Date Value Ref Range Status   06/10/2022 Non-Reactive Non-Reactive Final            HTN screening  BP Readings from Last 1 Encounters:   06/19/24 120/80           []   On BP medication   []   Lifestyle measures   []   Normotensive   Lifestyle Factors:  Caffeine Intake: 400mg/day  Alcohol Intake: occasional/infrequent  Smoking status:   Social History     Tobacco Use   Smoking Status Never    Passive exposure: Never   Smokeless Tobacco Never      Exogenous hormone use: None  NSAID use: None  Exercise: irregularly       Subjective      Review of Systems:   Review of Systems - See HPI    Past Medical History:   Past Medical History:   Diagnosis Date    Cholelithiasis 8/2019    Gallbladder removed    Hypertension     Left Intracranial hemorrhage (CMS/HCC)     pt reports resolution to right-sided hemiparesis     Stroke        Past Surgical History:   Past Surgical History:   Procedure Laterality Date    APPENDECTOMY      Open    CHOLECYSTECTOMY N/A 8/7/2019    Procedure: CHOLECYSTECTOMY LAPAROSCOPIC;  Surgeon: Tawanna Hemphill MD;  Location: [facility];  Service: General    FOOT SURGERY      WISDOM TOOTH EXTRACTION         Family History:   Family History   Problem Relation Age of Onset    Obesity Mother     Hypertension Father     Kidney disease Father     Heart murmur Father     No Known Problems Daughter     No Known Problems Daughter        Social History:   Social History     Socioeconomic History    Marital status: Single   Tobacco Use    Smoking status: Never     Passive exposure: Never    Smokeless tobacco: Never   Vaping Use    Vaping status: Never Used   Substance and Sexual Activity    Alcohol use: Yes     Comment: Rarely    Drug use: Never    Sexual activity: Not Currently      "Partners: Female     Birth control/protection: Condom       Social History     Social History Narrative    Lives with daughter and mother. Works at [employer].         Tobacco History:   Social History     Tobacco Use   Smoking Status Never    Passive exposure: Never   Smokeless Tobacco Never       Medications:     Current Outpatient Medications:     lisinopril (PRINIVIL,ZESTRIL) 10 MG tablet, Take 1 tablet by mouth Every 12 (Twelve) Hours., Disp: 180 tablet, Rfl: 3    naproxen sodium (ALEVE) 220 MG tablet,  See Instructions, 0 Refill(s), PRN, Disp: , Rfl:     Immunizations:   Immunization History   Administered Date(s) Administered    COVID-19 ([employer]) 04/11/2021    Flu Vaccine Quad PF >36MO 10/25/2019    Fluzone (or Fluarix & Flulaval for VFC) >6mos 10/25/2019, 12/08/2021    Tdap 06/10/2022        Allergies:   No Known Allergies    Objective   Objective     Physical Exam:  Vital Signs:   Vitals:    06/19/24 0804   BP: 120/80   Pulse: 70   Weight: 108 kg (239 lb)   Height: 167.6 cm (65.98\")   PainSc: 0-No pain     Body mass index is 38.59 kg/m².   Facility age limit for growth %jose luis is 20 years.   Physical Exam  Const: NAD, A&Ox4, Pleasant, Cooperative  Eyes: EOMI, no conjunctivitis  ENT: No nasal discharge present, neck supple  Cardiac: Regular rate and rhythm, no cyanosis  Resp: Respiratory rate within normal limits, no increased work of breathing, no audible wheezing or retractions noted  GI: No distention or ascites  Neurologic: CN II-XII grossly intact  Psych: Appropriate mood and behavior.  Skin: Pink, warm, dry  PHQ-2 Depression Screening  Little interest or pleasure in doing things? 0-->not at all   Feeling down, depressed, or hopeless? 0-->not at all   PHQ-2 Total Score 0     Procedures/Radiology     Procedures  No radiology results for the last 7 days         Assessment & Plan   Assessment / Plan      Assessment/Plan:   Problems Addressed This Visit  Diagnoses and all orders for this visit:    1. " Preventative health care (Primary)    2. Class 2 severe obesity due to excess calories with serious comorbidity and body mass index (BMI) of 38.0 to 38.9 in adult  Assessment & Plan:  Worsening since last year, up from 227 to 239. Still down substantially from before he had his stroke (330lbs). 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 4A-1P or 1P-10P      3. Essential hypertension  Assessment & Plan:  BP well controlled 120/80.      4. Pure hypercholesterolemia    5. Screening for hyperlipidemia  -     Lipid Panel; Future    6. Screening for endocrine disorder  -     Hemoglobin A1c; Future  -     Comprehensive Metabolic Panel; Future  -     TSH Rfx On Abnormal To Free T4; Future    7. Screening for deficiency anemia  -     CBC & Differential; Future    8. Screening for blood or protein in urine  -     Urinalysis With Microscopic If Indicated (No Culture) - Urine, Clean Catch; Future    9. Screening PSA (prostate specific antigen)  -     PSA Screen; Future      Problem List Items Addressed This Visit          Cardiac and Vasculature    Essential hypertension    Overview     With his significant weight loss and dedication to diet and activity, he has been able to reduce his lisinopril from 30mg BID to 20mg BID over the last years and even to 10mg BID as of 2022.         Current Assessment & Plan     BP well controlled 120/80.         Pure hypercholesterolemia    Overview     No medication            Endocrine and Metabolic    Class 2 severe obesity due to excess calories with serious comorbidity and body mass index (BMI) of 38.0 to 38.9 in adult    Current Assessment & Plan     Worsening since last year, up from 227 to 239. Still down substantially from before he had his stroke (330lbs). 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 4A-1P or 1P-10P          Other Visit Diagnoses       Preventative health care    -  Primary    Screening for hyperlipidemia        Relevant  Orders    Lipid Panel    Screening for endocrine disorder        Relevant Orders    Hemoglobin A1c    Comprehensive Metabolic Panel    TSH Rfx On Abnormal To Free T4    Screening for deficiency anemia        Relevant Orders    CBC & Differential    Screening for blood or protein in urine        Relevant Orders    Urinalysis With Microscopic If Indicated (No Culture) - Urine, Clean Catch    Screening PSA (prostate specific antigen)        Relevant Orders    PSA Screen            See patient diagnoses and orders along with patient instructions for assessment, plan, and changes to care for patient.    The preventative exam has been reviewed in detail.  The patient has been fully counseled on preventative guidelines for vaccines, cancer screenings, and other health maintenance needs. The patient was counseled on maintaining a lifestyle to promote good health and to minimize chronic diseases.  The patient has been assisted with scheduling healthcare procedures for the coming year and given a written document outlining these recommendations. Age-appropriate screening measures have been ordered for the patient today as indicated above.    There are no Patient Instructions on file for this visit.    Follow Up:   Return in about 1 year (around 6/19/2025) for Annual.    DO MARIS Issa RD  [facility]  [id number]  [facility] [phone]  Fax [phone]  Phone [phone]

## 2024-06-19 NOTE — ASSESSMENT & PLAN NOTE
Worsening since last year, up from 227 to 239. Still down substantially from before he had his stroke (330lbs). 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 4A-1P or 1P-10P

## 2024-07-15 ENCOUNTER — PATIENT ROUNDING (BHMG ONLY) (OUTPATIENT)
Dept: URGENT CARE | Facility: CLINIC | Age: 54
End: 2024-07-15
Payer: COMMERCIAL

## 2024-12-16 DIAGNOSIS — I10 ESSENTIAL HYPERTENSION: ICD-10-CM

## 2024-12-16 DIAGNOSIS — I61.0 NONTRAUMATIC SUBCORTICAL HEMORRHAGE OF LEFT CEREBRAL HEMISPHERE: ICD-10-CM

## 2024-12-16 RX ORDER — LISINOPRIL 10 MG/1
10 TABLET ORAL EVERY 12 HOURS SCHEDULED
Qty: 180 TABLET | Refills: 3 | Status: SHIPPED | OUTPATIENT
Start: 2024-12-16

## 2024-12-16 NOTE — TELEPHONE ENCOUNTER
Rx Refill Note  Requested Prescriptions     Pending Prescriptions Disp Refills    lisinopril (PRINIVIL,ZESTRIL) 10 MG tablet 180 tablet 3     Sig: Take 1 tablet by mouth Every 12 (Twelve) Hours.      Last office visit with prescribing clinician: 6/19/2024   Last telemedicine visit with prescribing clinician: Visit date not found   Next office visit with prescribing clinician: 6/20/2025                         Would you like a call back once the refill request has been completed: [] Yes [] No    If the office needs to give you a call back, can they leave a voicemail: [] Yes [] No    Cary Centeno MA  12/16/24, 10:29 EST

## 2025-07-07 ENCOUNTER — LAB (OUTPATIENT)
Dept: LAB | Facility: HOSPITAL | Age: 55
End: 2025-07-07
Payer: COMMERCIAL

## 2025-07-07 ENCOUNTER — OFFICE VISIT (OUTPATIENT)
Dept: FAMILY MEDICINE CLINIC | Facility: CLINIC | Age: 55
End: 2025-07-07
Payer: COMMERCIAL

## 2025-07-07 VITALS
HEIGHT: 66 IN | SYSTOLIC BLOOD PRESSURE: 114 MMHG | WEIGHT: 244.4 LBS | BODY MASS INDEX: 39.28 KG/M2 | DIASTOLIC BLOOD PRESSURE: 72 MMHG | OXYGEN SATURATION: 97 % | HEART RATE: 55 BPM

## 2025-07-07 DIAGNOSIS — Z13.29 SCREENING FOR ENDOCRINE DISORDER: ICD-10-CM

## 2025-07-07 DIAGNOSIS — Z13.0 SCREENING FOR DEFICIENCY ANEMIA: ICD-10-CM

## 2025-07-07 DIAGNOSIS — Z13.89 SCREENING FOR BLOOD OR PROTEIN IN URINE: ICD-10-CM

## 2025-07-07 DIAGNOSIS — Z13.220 SCREENING FOR HYPERLIPIDEMIA: ICD-10-CM

## 2025-07-07 DIAGNOSIS — E78.00 PURE HYPERCHOLESTEROLEMIA: ICD-10-CM

## 2025-07-07 DIAGNOSIS — Z00.00 PREVENTATIVE HEALTH CARE: ICD-10-CM

## 2025-07-07 DIAGNOSIS — I10 ESSENTIAL HYPERTENSION: ICD-10-CM

## 2025-07-07 DIAGNOSIS — Z12.5 SCREENING PSA (PROSTATE SPECIFIC ANTIGEN): ICD-10-CM

## 2025-07-07 DIAGNOSIS — Z00.00 PREVENTATIVE HEALTH CARE: Primary | ICD-10-CM

## 2025-07-07 LAB
ALBUMIN SERPL-MCNC: 4.2 G/DL (ref 3.5–5.2)
ALBUMIN/GLOB SERPL: 1.3 G/DL
ALP SERPL-CCNC: 91 U/L (ref 39–117)
ALT SERPL W P-5'-P-CCNC: 16 U/L (ref 1–41)
ANION GAP SERPL CALCULATED.3IONS-SCNC: 9.4 MMOL/L (ref 5–15)
AST SERPL-CCNC: 26 U/L (ref 1–40)
BASOPHILS # BLD AUTO: 0.09 10*3/MM3 (ref 0–0.2)
BASOPHILS NFR BLD AUTO: 1.1 % (ref 0–1.5)
BILIRUB SERPL-MCNC: 0.6 MG/DL (ref 0–1.2)
BILIRUB UR QL STRIP: NEGATIVE
BUN SERPL-MCNC: 15 MG/DL (ref 6–20)
BUN/CREAT SERPL: 13.9 (ref 7–25)
CALCIUM SPEC-SCNC: 10 MG/DL (ref 8.6–10.5)
CHLORIDE SERPL-SCNC: 101 MMOL/L (ref 98–107)
CHOLEST SERPL-MCNC: 182 MG/DL (ref 0–200)
CLARITY UR: CLEAR
CO2 SERPL-SCNC: 27.6 MMOL/L (ref 22–29)
COLOR UR: YELLOW
CREAT SERPL-MCNC: 1.08 MG/DL (ref 0.76–1.27)
DEPRECATED RDW RBC AUTO: 42.6 FL (ref 37–54)
EGFRCR SERPLBLD CKD-EPI 2021: 81 ML/MIN/1.73
EOSINOPHIL # BLD AUTO: 0.22 10*3/MM3 (ref 0–0.4)
EOSINOPHIL NFR BLD AUTO: 2.7 % (ref 0.3–6.2)
ERYTHROCYTE [DISTWIDTH] IN BLOOD BY AUTOMATED COUNT: 12.4 % (ref 12.3–15.4)
GLOBULIN UR ELPH-MCNC: 3.2 GM/DL
GLUCOSE SERPL-MCNC: 89 MG/DL (ref 65–99)
GLUCOSE UR STRIP-MCNC: NEGATIVE MG/DL
HBA1C MFR BLD: 5.6 % (ref 4.8–5.6)
HCT VFR BLD AUTO: 44.4 % (ref 37.5–51)
HDLC SERPL-MCNC: 55 MG/DL (ref 40–60)
HGB BLD-MCNC: 14.3 G/DL (ref 13–17.7)
HGB UR QL STRIP.AUTO: NEGATIVE
IMM GRANULOCYTES # BLD AUTO: 0.02 10*3/MM3 (ref 0–0.05)
IMM GRANULOCYTES NFR BLD AUTO: 0.2 % (ref 0–0.5)
KETONES UR QL STRIP: NEGATIVE
LDLC SERPL CALC-MCNC: 116 MG/DL (ref 0–100)
LDLC/HDLC SERPL: 2.1 {RATIO}
LEUKOCYTE ESTERASE UR QL STRIP.AUTO: NEGATIVE
LYMPHOCYTES # BLD AUTO: 1.7 10*3/MM3 (ref 0.7–3.1)
LYMPHOCYTES NFR BLD AUTO: 21 % (ref 19.6–45.3)
MCH RBC QN AUTO: 30 PG (ref 26.6–33)
MCHC RBC AUTO-ENTMCNC: 32.2 G/DL (ref 31.5–35.7)
MCV RBC AUTO: 93.3 FL (ref 79–97)
MONOCYTES # BLD AUTO: 0.87 10*3/MM3 (ref 0.1–0.9)
MONOCYTES NFR BLD AUTO: 10.8 % (ref 5–12)
NEUTROPHILS NFR BLD AUTO: 5.19 10*3/MM3 (ref 1.7–7)
NEUTROPHILS NFR BLD AUTO: 64.2 % (ref 42.7–76)
NITRITE UR QL STRIP: NEGATIVE
NRBC BLD AUTO-RTO: 0 /100 WBC (ref 0–0.2)
PH UR STRIP.AUTO: 7 [PH] (ref 5–8)
PLATELET # BLD AUTO: 273 10*3/MM3 (ref 140–450)
PMV BLD AUTO: 11.2 FL (ref 6–12)
POTASSIUM SERPL-SCNC: 4.5 MMOL/L (ref 3.5–5.2)
PROT SERPL-MCNC: 7.4 G/DL (ref 6–8.5)
PROT UR QL STRIP: NEGATIVE
PSA SERPL-MCNC: 0.28 NG/ML (ref 0–4)
RBC # BLD AUTO: 4.76 10*6/MM3 (ref 4.14–5.8)
SODIUM SERPL-SCNC: 138 MMOL/L (ref 136–145)
SP GR UR STRIP: 1.02 (ref 1–1.03)
TRIGL SERPL-MCNC: 58 MG/DL (ref 0–150)
TSH SERPL DL<=0.05 MIU/L-ACNC: 3.51 UIU/ML (ref 0.27–4.2)
UROBILINOGEN UR QL STRIP: NORMAL
VLDLC SERPL-MCNC: 11 MG/DL (ref 5–40)
WBC NRBC COR # BLD AUTO: 8.09 10*3/MM3 (ref 3.4–10.8)

## 2025-07-07 PROCEDURE — 80061 LIPID PANEL: CPT

## 2025-07-07 PROCEDURE — G0103 PSA SCREENING: HCPCS

## 2025-07-07 PROCEDURE — 80050 GENERAL HEALTH PANEL: CPT

## 2025-07-07 PROCEDURE — 99396 PREV VISIT EST AGE 40-64: CPT | Performed by: FAMILY MEDICINE

## 2025-07-07 PROCEDURE — 81003 URINALYSIS AUTO W/O SCOPE: CPT

## 2025-07-07 PROCEDURE — 83036 HEMOGLOBIN GLYCOSYLATED A1C: CPT

## 2025-07-07 NOTE — PATIENT INSTRUCTIONS
1.  Continue medications and supplements - as listed.    2.  Continue well-balanced diet - low in calories and low in added sugar.  -Plant-based diets have the best evidence for decreasing inflammation and chronic pain, as well as reducing the risk of heart disease and dementia.    3.  Maintain a routine exercise program - 10 minutes at least 6 days every week.  This is important even if you are active at your job.    4.  A letter will be sent with your test results or they will be made available via [employer].  If you have not heard about your results within 10 days for chronic and routine labs, or 48 hours for acute labs, please call the office.    5.  If you would like to have your labs completed prior to your next visit to be discussed at your appointment, please call the office 1 to 2 weeks before your scheduled visit to request that lab orders be placed.

## 2025-07-09 NOTE — PROGRESS NOTES
Annual Well Adult Visit     Patient Name: Valente Arndt Jr.  : 1970   MRN: [id number]   Care Team: Patient Care Team:  Raoul Anthony DO as PCP - General (Family Medicine)    Chief Complaint:    Chief Complaint   Patient presents with    Annual Exam       HPI    History of Present Illness: Valente Arndt Jr. is a 55 y.o. male who presents today for annual physical exam and preventative care.    History of Present Illness  The patient is a 55-year-old male who presents today for an annual exam.    He reports no new health concerns since his last visit. His weight has slightly increased from the previous year, which he attributes to a decrease in physical activity due to work commitments. He does not require any medication refills at this time. He continues to take lisinopril 10 mg twice daily. He recently returned from a four-day vacation and is currently catching up on work, which has been particularly demanding.    He reports no abdominal pain, constipation, diarrhea, difficulty swallowing, or throat discomfort. He regularly consults with a dentist and an ophthalmologist. He has not seen a dermatologist and has no skin concerns. He reports no testicular abnormalities or swelling in his hands or feet. His sleep pattern is consistent, averaging 6 to 7 hours per night. He does not consume caffeine daily and abstains from alcohol.    His last surgical procedure was a cholecystectomy. He has no siblings. His paternal grandmother had colon cancer in her 90s.    PAST SURGICAL HISTORY:  Cholecystectomy    SOCIAL HISTORY  He does not drink alcohol.    FAMILY HISTORY  His paternal grandmother had colon cancer in her 90s.      Recent Hospitalizations:  He was not admitted to the hospital during the last year.     The following portions of the patient's history were reviewed and updated as appropriate: allergies, current medications, past family history, past medical history, past social history, past  surgical history and problem list.       Allied Screenings    N/A         Date      Eye Exam   Uses Glasses/Contacts and Eye Exam Up To Date    []              [x]   Up to date    Location:   []   Recommended       Counseled every 2 years in those without known issues, yearly if wearing glasses or contacts      Dental Exam   Dentition: good    []           [x]   Up to date   Location:   []   Recommended       Counseled, recommended cleanings every 6 months, daily brushing and flossing        Skin Cancer Screening        []            []   Up to date   Location:   [x]   Recommended Counseled on regular sunscreen wear, self-skin checks      Obesity Counseling  Body mass index is 39.28 kg/m².       []        [x]   Complete   []   Nutritionist referral   []   Declined Counseled on moderate portions, low meat diet focusing on whole foods and plant-based protein          Additional Testing         Date      Colorectal Screening   Risk: average     []   N/A   []   Ordered today   [x]   Complete        Date:     Where:         PSA  (Over age 50)     []   N/A   []   Ordered today   [x]   Complete      Lab Results   Component Value Date    PSA 0.280 07/07/2025    PSA 0.274 06/19/2024    PSA 0.287 06/14/2023           US Aorta  (For male with >20 cigarette history, age 65)     [x]   N/A   []   Ordered today   []   Complete    Date:     Where:      CT for Smoker  (Age 55-75, 30 pk yr)     [x]   N/A   []   Ordered today   []   Complete    Date:     Where:      Hep. C     []   Ordered today   [x]   Complete      Hepatitis C Ab   Date Value Ref Range Status   06/10/2022 Non-Reactive Non-Reactive Final            HTN screening  BP Readings from Last 1 Encounters:   07/07/25 114/72           []   On BP medication   []   Lifestyle measures   []   Normotensive   Lifestyle Factors:  Caffeine Intake: 400mg/day  Alcohol Intake: occasional/infrequent  Smoking status:   Social History     Tobacco Use   Smoking Status Never    Passive  exposure: Never   Smokeless Tobacco Never      Exogenous hormone use: None  NSAID use: None  Exercise: irregularly       Subjective      Review of Systems:   Review of Systems - See HPI    Past Medical History:   Past Medical History:   Diagnosis Date    Cholelithiasis 8/2019    Gallbladder removed    Hypertension     Left Intracranial hemorrhage (CMS/HCC)     pt reports resolution to right-sided hemiparesis     Stroke        Past Surgical History:   Past Surgical History:   Procedure Laterality Date    APPENDECTOMY      Open    CHOLECYSTECTOMY N/A 8/7/2019    Procedure: CHOLECYSTECTOMY LAPAROSCOPIC;  Surgeon: Tawanna Hemphill MD;  Location: [facility];  Service: General    FOOT SURGERY      WISDOM TOOTH EXTRACTION         Family History:   Family History   Problem Relation Age of Onset    Obesity Mother     Hypertension Father     Kidney disease Father     Heart murmur Father     Colon cancer Paternal Grandmother 90 - 99    No Known Problems Daughter     No Known Problems Daughter        Social History:   Social History     Socioeconomic History    Marital status: Single   Tobacco Use    Smoking status: Never     Passive exposure: Never    Smokeless tobacco: Never   Vaping Use    Vaping status: Never Used   Substance and Sexual Activity    Alcohol use: Yes     Comment: Rarely    Drug use: Never    Sexual activity: Not Currently     Partners: Female     Birth control/protection: Condom       Social History     Social History Narrative    Lives with daughter and mother. Works at [employer].         Tobacco History:   Social History     Tobacco Use   Smoking Status Never    Passive exposure: Never   Smokeless Tobacco Never       Medications:     Current Outpatient Medications:     lisinopril (PRINIVIL,ZESTRIL) 10 MG tablet, Take 1 tablet by mouth Every 12 (Twelve) Hours., Disp: 180 tablet, Rfl: 3    Immunizations:   Immunization History   Administered Date(s) Administered    COVID-19 (compareit4me) 04/11/2021    Flu Vaccine  "Quad PF >36MO 10/25/2019    Fluzone (or Fluarix & Flulaval for VFC) >6mos 10/25/2019, 12/08/2021    Tdap 06/10/2022        Allergies:   No Known Allergies    Objective   Objective     Physical Exam:  Vital Signs:   Vitals:    07/07/25 1019   BP: 114/72   Pulse: 55   SpO2: 97%   Weight: 111 kg (244 lb 6.4 oz)   Height: 168 cm (66.14\")     Body mass index is 39.28 kg/m².   Facility age limit for growth %jose luis is 20 years.   Physical Exam  Const: NAD, A&Ox4, Pleasant, Cooperative  Eyes: EOMI, no conjunctivitis  ENT: No nasal discharge present, neck supple  Cardiac: Regular rate and rhythm, no cyanosis  Resp: Respiratory rate within normal limits, no increased work of breathing, no audible wheezing or retractions noted  GI: No distention or ascites  Neurologic: CN II-XII grossly intact  Psych: Appropriate mood and behavior.  Skin: Pink, warm, dry  PHQ-2 Depression Screening  Little interest or pleasure in doing things?     Feeling down, depressed, or hopeless?     PHQ-2 Total Score       Procedures/Radiology     Procedures  No radiology results for the last 7 days         Assessment & Plan   Assessment / Plan      Assessment/Plan:   Problems Addressed This Visit  Diagnoses and all orders for this visit:    1. Preventative health care (Primary)  -     Lipid Panel; Future  -     Hemoglobin A1c; Future  -     Comprehensive Metabolic Panel; Future  -     CBC & Differential; Future  -     Urinalysis With Microscopic If Indicated (No Culture) - Urine, Clean Catch; Future  -     TSH Rfx On Abnormal To Free T4; Future  -     PSA Screen; Future    2. Pure hypercholesterolemia    3. Essential hypertension  Assessment & Plan:  BP well controlled 114/      4. Screening for hyperlipidemia  -     Lipid Panel; Future    5. Screening for endocrine disorder  -     Hemoglobin A1c; Future  -     Comprehensive Metabolic Panel; Future  -     TSH Rfx On Abnormal To Free T4; Future    6. Screening for deficiency anemia  -     CBC & " Differential; Future    7. Screening for blood or protein in urine  -     Urinalysis With Microscopic If Indicated (No Culture) - Urine, Clean Catch; Future    8. Screening PSA (prostate specific antigen)  -     PSA Screen; Future      Problem List Items Addressed This Visit          Cardiac and Vasculature    Essential hypertension    Overview   With his significant weight loss and dedication to diet and activity, he has been able to reduce his lisinopril from 30mg BID to 20mg BID over the last years and even to 10mg BID as of 2022.         Current Assessment & Plan   BP well controlled 114/         Pure hypercholesterolemia    Overview   No medication          Other Visit Diagnoses         Preventative health care    -  Primary    Relevant Orders    Lipid Panel (Completed)    Hemoglobin A1c (Completed)    Comprehensive Metabolic Panel (Completed)    CBC & Differential (Completed)    Urinalysis With Microscopic If Indicated (No Culture) - Urine, Clean Catch (Completed)    TSH Rfx On Abnormal To Free T4 (Completed)    PSA Screen (Completed)      Screening for hyperlipidemia        Relevant Orders    Lipid Panel (Completed)      Screening for endocrine disorder        Relevant Orders    Hemoglobin A1c (Completed)    Comprehensive Metabolic Panel (Completed)    TSH Rfx On Abnormal To Free T4 (Completed)      Screening for deficiency anemia        Relevant Orders    CBC & Differential (Completed)      Screening for blood or protein in urine        Relevant Orders    Urinalysis With Microscopic If Indicated (No Culture) - Urine, Clean Catch (Completed)      Screening PSA (prostate specific antigen)        Relevant Orders    PSA Screen (Completed)          Assessment & Plan  1. Annual exam.  - Blood pressure readings are within the normal range, showing slight improvement from the previous year.  - Thyroid function appears to be slightly compromised but overall satisfactory. A1c levels are hovering around the prediabetic  range.  - Routine lab work will be ordered today to monitor thyroid function and blood sugar levels.  - If blood sugar levels continue to rise, consideration may be given to initiating metformin or an injectable medication such as Wegovy.    2. Hypertension.  - Blood pressure readings are within the normal range, showing slight improvement from the previous year.  - Physical exam findings indicate no swelling in hands or feet, and no new issues reported.  - Refills for lisinopril 10 mg twice a day will be provided.  - Continued monitoring of blood pressure and medication effectiveness.    Patient or patient representative verbalized consent for the use of Ambient Listening during the visit with  Raoul Anthony DO for chart documentation. 7/9/2025 13:07 EDT     See patient diagnoses and orders along with patient instructions for assessment, plan, and changes to care for patient.    The preventative exam has been reviewed in detail.  The patient has been fully counseled on preventative guidelines for vaccines, cancer screenings, and other health maintenance needs. The patient was counseled on maintaining a lifestyle to promote good health and to minimize chronic diseases.  The patient has been assisted with scheduling healthcare procedures for the coming year and given a written document outlining these recommendations. Age-appropriate screening measures have been ordered for the patient today as indicated above.    Patient Instructions   1.  Continue medications and supplements - as listed.    2.  Continue well-balanced diet - low in calories and low in added sugar.  -Plant-based diets have the best evidence for decreasing inflammation and chronic pain, as well as reducing the risk of heart disease and dementia.    3.  Maintain a routine exercise program - 10 minutes at least 6 days every week.  This is important even if you are active at your job.    4.  A letter will be sent with your test results or they will  be made available via [employer].  If you have not heard about your results within 10 days for chronic and routine labs, or 48 hours for acute labs, please call the office.    5.  If you would like to have your labs completed prior to your next visit to be discussed at your appointment, please call the office 1 to 2 weeks before your scheduled visit to request that lab orders be placed.     Follow Up:   Return in about 1 year (around 7/7/2026) for Annual, (fasting blood work 1 week prior to appt).    DO MARIS Issa RD  [facility]  [address]  [facility] [phone]  Fax [phone]  Phone [phone]

## (undated) DEVICE — ENDOPATH XCEL BLADELESS TROCARS WITH STABILITY SLEEVES: Brand: ENDOPATH XCEL

## (undated) DEVICE — GLV SURG PREMIERPRO MIC LTX PF SZ6.5 BRN

## (undated) DEVICE — [HIGH FLOW INSUFFLATOR,  DO NOT USE IF PACKAGE IS DAMAGED,  KEEP DRY,  KEEP AWAY FROM SUNLIGHT,  PROTECT FROM HEAT AND RADIOACTIVE SOURCES.]: Brand: PNEUMOSURE

## (undated) DEVICE — VISUALIZATION SYSTEM: Brand: CLEARIFY

## (undated) DEVICE — SUT VIC 0 UR6 27IN VCP603H

## (undated) DEVICE — PK LAP LASR CHOLE 10

## (undated) DEVICE — ENDOPATH XCEL UNIVERSAL TROCAR STABLILITY SLEEVES: Brand: ENDOPATH XCEL

## (undated) DEVICE — SKIN AFFIX SURG ADHESIVE 72/CS 0.55ML: Brand: MEDLINE

## (undated) DEVICE — SUT SILK 2/0 TIES 18IN A185H

## (undated) DEVICE — ANTIBACTERIAL UNDYED BRAIDED (POLYGLACTIN 910), SYNTHETIC ABSORBABLE SUTURE: Brand: COATED VICRYL

## (undated) DEVICE — ENDOCUT SCISSOR TIP, DISPOSABLE: Brand: RENEW

## (undated) DEVICE — ENDOPOUCH RETRIEVER SPECIMEN RETRIEVAL BAGS: Brand: ENDOPOUCH RETRIEVER

## (undated) DEVICE — ENDOPATH XCEL BLUNT TIP TROCARS WITH SMOOTH SLEEVES: Brand: ENDOPATH XCEL

## (undated) DEVICE — COVER,LIGHT HANDLE,FLX,1/PK: Brand: MEDLINE INDUSTRIES, INC.